# Patient Record
Sex: FEMALE | Race: BLACK OR AFRICAN AMERICAN | NOT HISPANIC OR LATINO
[De-identification: names, ages, dates, MRNs, and addresses within clinical notes are randomized per-mention and may not be internally consistent; named-entity substitution may affect disease eponyms.]

---

## 2017-04-11 ENCOUNTER — APPOINTMENT (OUTPATIENT)
Dept: GYNECOLOGIC ONCOLOGY | Facility: CLINIC | Age: 77
End: 2017-04-11

## 2017-04-11 VITALS
WEIGHT: 138 LBS | SYSTOLIC BLOOD PRESSURE: 125 MMHG | BODY MASS INDEX: 22.99 KG/M2 | HEIGHT: 65 IN | DIASTOLIC BLOOD PRESSURE: 78 MMHG

## 2017-10-17 ENCOUNTER — APPOINTMENT (OUTPATIENT)
Dept: GYNECOLOGIC ONCOLOGY | Facility: CLINIC | Age: 77
End: 2017-10-17
Payer: COMMERCIAL

## 2017-10-17 VITALS
BODY MASS INDEX: 22.49 KG/M2 | HEIGHT: 65 IN | DIASTOLIC BLOOD PRESSURE: 80 MMHG | WEIGHT: 135 LBS | SYSTOLIC BLOOD PRESSURE: 120 MMHG

## 2017-10-17 DIAGNOSIS — N64.52 NIPPLE DISCHARGE: ICD-10-CM

## 2017-10-17 PROCEDURE — 99214 OFFICE O/P EST MOD 30 MIN: CPT

## 2017-10-17 RX ORDER — OMEPRAZOLE 20 MG/1
20 CAPSULE, DELAYED RELEASE ORAL
Refills: 0 | Status: ACTIVE | COMMUNITY

## 2017-11-16 ENCOUNTER — RESULT REVIEW (OUTPATIENT)
Age: 77
End: 2017-11-16

## 2018-03-06 ENCOUNTER — APPOINTMENT (OUTPATIENT)
Dept: SURGERY | Facility: CLINIC | Age: 78
End: 2018-03-06
Payer: MEDICARE

## 2018-03-06 ENCOUNTER — LABORATORY RESULT (OUTPATIENT)
Age: 78
End: 2018-03-06

## 2018-03-06 VITALS
DIASTOLIC BLOOD PRESSURE: 68 MMHG | TEMPERATURE: 97.9 F | HEIGHT: 65 IN | BODY MASS INDEX: 22.16 KG/M2 | SYSTOLIC BLOOD PRESSURE: 157 MMHG | WEIGHT: 133 LBS | HEART RATE: 60 BPM

## 2018-03-06 DIAGNOSIS — N60.12 DIFFUSE CYSTIC MASTOPATHY OF LEFT BREAST: ICD-10-CM

## 2018-03-06 PROCEDURE — 99203 OFFICE O/P NEW LOW 30 MIN: CPT | Mod: 25

## 2018-03-06 PROCEDURE — 10021 FNA BX W/O IMG GDN 1ST LES: CPT

## 2018-03-20 ENCOUNTER — APPOINTMENT (OUTPATIENT)
Dept: SURGERY | Facility: CLINIC | Age: 78
End: 2018-03-20
Payer: MEDICARE

## 2018-03-20 VITALS
BODY MASS INDEX: 22.16 KG/M2 | DIASTOLIC BLOOD PRESSURE: 87 MMHG | HEIGHT: 65 IN | HEART RATE: 63 BPM | WEIGHT: 133 LBS | SYSTOLIC BLOOD PRESSURE: 159 MMHG | TEMPERATURE: 98.4 F

## 2018-03-20 PROCEDURE — 99214 OFFICE O/P EST MOD 30 MIN: CPT

## 2018-03-26 ENCOUNTER — OUTPATIENT (OUTPATIENT)
Dept: OUTPATIENT SERVICES | Facility: HOSPITAL | Age: 78
LOS: 1 days | End: 2018-03-26

## 2018-03-26 VITALS
WEIGHT: 132.06 LBS | SYSTOLIC BLOOD PRESSURE: 133 MMHG | RESPIRATION RATE: 16 BRPM | HEIGHT: 66 IN | DIASTOLIC BLOOD PRESSURE: 83 MMHG | HEART RATE: 79 BPM | OXYGEN SATURATION: 98 % | TEMPERATURE: 98 F

## 2018-03-26 DIAGNOSIS — Z98.890 OTHER SPECIFIED POSTPROCEDURAL STATES: Chronic | ICD-10-CM

## 2018-03-26 DIAGNOSIS — Z90.710 ACQUIRED ABSENCE OF BOTH CERVIX AND UTERUS: Chronic | ICD-10-CM

## 2018-03-26 DIAGNOSIS — Z98.89 OTHER SPECIFIED POSTPROCEDURAL STATES: Chronic | ICD-10-CM

## 2018-03-26 DIAGNOSIS — N63.0 UNSPECIFIED LUMP IN UNSPECIFIED BREAST: ICD-10-CM

## 2018-03-26 DIAGNOSIS — Z01.818 ENCOUNTER FOR OTHER PREPROCEDURAL EXAMINATION: ICD-10-CM

## 2018-03-26 NOTE — H&P PST ADULT - NEGATIVE OPHTHALMOLOGIC SYMPTOMS
no lacrimation L/no lacrimation R/no blurred vision R/no diplopia/no blurred vision L/no photophobia

## 2018-03-26 NOTE — H&P PST ADULT - FAMILY HISTORY
Sibling  Still living? No  Family history of breast cancer in sister, Age at diagnosis: Age Unknown  Family history of pancreatic cancer, Age at diagnosis: Age Unknown

## 2018-03-26 NOTE — H&P PST ADULT - MUSCULOSKELETAL
details… detailed exam no calf tenderness/no joint swelling/no joint erythema/normal/ROM intact/no joint warmth/normal strength

## 2018-03-26 NOTE — H&P PST ADULT - NSANTHOSAYNRD_GEN_A_CORE
No. YEIMI screening performed.  STOP BANG Legend: 0-2 = LOW Risk; 3-4 = INTERMEDIATE Risk; 5-8 = HIGH Risk

## 2018-03-26 NOTE — H&P PST ADULT - PMH
Breast discharge  left  Breast lump in female  left  Cataract  Bilateral  GERD (gastroesophageal reflux disease)    High cholesterol    History of hypertension    Ovarian ca  treated with chemo 1999 and 2008

## 2018-03-26 NOTE — H&P PST ADULT - PSH
H/O breast biopsy  Left  S/P abdominal hysterectomy    S/P breast biopsy, right  for discharge from breast  S/P  section  times two

## 2018-03-26 NOTE — H&P PST ADULT - NEGATIVE ENMT SYMPTOMS
no ear pain/no nasal discharge/no tinnitus/no vertigo/no hearing difficulty/no sinus symptoms/no nasal congestion

## 2018-03-26 NOTE — H&P PST ADULT - HISTORY OF PRESENT ILLNESS
76 yo female with history of HTN, HLD, GERD reports the above. Pt states has had biopsy in the left breast before. Patient denies pain in left breast

## 2018-03-27 ENCOUNTER — TRANSCRIPTION ENCOUNTER (OUTPATIENT)
Age: 78
End: 2018-03-27

## 2018-03-28 ENCOUNTER — RESULT REVIEW (OUTPATIENT)
Age: 78
End: 2018-03-28

## 2018-03-28 ENCOUNTER — OUTPATIENT (OUTPATIENT)
Dept: OUTPATIENT SERVICES | Facility: HOSPITAL | Age: 78
LOS: 1 days | End: 2018-03-28
Payer: COMMERCIAL

## 2018-03-28 ENCOUNTER — APPOINTMENT (OUTPATIENT)
Dept: SURGERY | Facility: HOSPITAL | Age: 78
End: 2018-03-28

## 2018-03-28 VITALS
RESPIRATION RATE: 17 BRPM | SYSTOLIC BLOOD PRESSURE: 144 MMHG | HEART RATE: 81 BPM | TEMPERATURE: 98 F | OXYGEN SATURATION: 98 % | DIASTOLIC BLOOD PRESSURE: 75 MMHG

## 2018-03-28 VITALS
OXYGEN SATURATION: 99 % | TEMPERATURE: 98 F | RESPIRATION RATE: 14 BRPM | WEIGHT: 132.06 LBS | DIASTOLIC BLOOD PRESSURE: 84 MMHG | SYSTOLIC BLOOD PRESSURE: 154 MMHG | HEIGHT: 66 IN | HEART RATE: 66 BPM

## 2018-03-28 DIAGNOSIS — Z98.89 OTHER SPECIFIED POSTPROCEDURAL STATES: Chronic | ICD-10-CM

## 2018-03-28 DIAGNOSIS — N63.0 UNSPECIFIED LUMP IN UNSPECIFIED BREAST: ICD-10-CM

## 2018-03-28 DIAGNOSIS — Z90.710 ACQUIRED ABSENCE OF BOTH CERVIX AND UTERUS: Chronic | ICD-10-CM

## 2018-03-28 DIAGNOSIS — Z98.890 OTHER SPECIFIED POSTPROCEDURAL STATES: Chronic | ICD-10-CM

## 2018-03-28 PROCEDURE — 19120 REMOVAL OF BREAST LESION: CPT | Mod: LT

## 2018-03-28 PROCEDURE — 88360 TUMOR IMMUNOHISTOCHEM/MANUAL: CPT

## 2018-03-28 PROCEDURE — 88305 TISSUE EXAM BY PATHOLOGIST: CPT

## 2018-03-28 PROCEDURE — 88360 TUMOR IMMUNOHISTOCHEM/MANUAL: CPT | Mod: 26

## 2018-03-28 PROCEDURE — 88305 TISSUE EXAM BY PATHOLOGIST: CPT | Mod: 26

## 2018-03-28 RX ORDER — OXYCODONE AND ACETAMINOPHEN 5; 325 MG/1; MG/1
1 TABLET ORAL EVERY 4 HOURS
Qty: 0 | Refills: 0 | Status: DISCONTINUED | OUTPATIENT
Start: 2018-03-28 | End: 2018-03-28

## 2018-03-28 RX ORDER — ONDANSETRON 8 MG/1
4 TABLET, FILM COATED ORAL ONCE
Qty: 0 | Refills: 0 | Status: DISCONTINUED | OUTPATIENT
Start: 2018-03-28 | End: 2018-03-29

## 2018-03-28 RX ORDER — ACETAMINOPHEN 500 MG
1000 TABLET ORAL ONCE
Qty: 0 | Refills: 0 | Status: COMPLETED | OUTPATIENT
Start: 2018-03-28 | End: 2018-03-28

## 2018-03-28 RX ORDER — LABETALOL HCL 100 MG
5 TABLET ORAL
Qty: 0 | Refills: 0 | Status: DISCONTINUED | OUTPATIENT
Start: 2018-03-28 | End: 2018-04-05

## 2018-03-28 RX ORDER — FENTANYL CITRATE 50 UG/ML
25 INJECTION INTRAVENOUS
Qty: 0 | Refills: 0 | Status: DISCONTINUED | OUTPATIENT
Start: 2018-03-28 | End: 2018-03-29

## 2018-03-28 RX ORDER — SODIUM CHLORIDE 9 MG/ML
3 INJECTION INTRAMUSCULAR; INTRAVENOUS; SUBCUTANEOUS EVERY 8 HOURS
Qty: 0 | Refills: 0 | Status: DISCONTINUED | OUTPATIENT
Start: 2018-03-28 | End: 2018-03-28

## 2018-03-28 RX ADMIN — Medication 400 MILLIGRAM(S): at 18:45

## 2018-03-28 RX ADMIN — Medication 5 MILLIGRAM(S): at 20:07

## 2018-03-28 RX ADMIN — Medication 1000 MILLIGRAM(S): at 19:45

## 2018-03-28 NOTE — ASU DISCHARGE PLAN (ADULT/PEDIATRIC). - MEDICATION SUMMARY - MEDICATIONS TO TAKE
I will START or STAY ON the medications listed below when I get home from the hospital:    oxyCODONE-acetaminophen 5 mg-325 mg oral tablet  -- 1 tab(s) by mouth every 6 hours as needed for pain not controlled with ibuprofen MDD:4  -- Indication: For Pain management    aspirin 81 mg oral tablet  -- 1 tab(s) by mouth once a day  -- Indication: For Home medication    simvastatin 20 mg oral tablet  -- 1 tab(s) by mouth once a day (at bedtime)  -- Indication: For Home medication    irbesartan-hydrochlorothiazide 150mg-12.5mg oral tablet  -- 1 tab(s) by mouth once a day  -- Indication: For Home medication    metoprolol tartrate 25 mg oral tablet  -- 1 tab(s) by mouth every 12 hours  -- Indication: For Home medication    omeprazole 20 mg oral delayed release capsule  -- 1 cap(s) by mouth once a day  -- Indication: For Home medication    Multiple Vitamins oral tablet  -- 1 tab(s) by mouth once a day  -- Indication: For Home medication    Vitamin D3 1000 intl units oral tablet  -- 1 tab(s) by mouth once a day  -- Indication: For Home medication    Vitamin C 500 mg oral tablet  -- 1 tab(s) by mouth once a day  -- Indication: For Home medication

## 2018-03-28 NOTE — BRIEF OPERATIVE NOTE - PROCEDURE
<<-----Click on this checkbox to enter Procedure Excision of left breast mass  03/28/2018    Active  [facility]

## 2018-04-02 ENCOUNTER — APPOINTMENT (OUTPATIENT)
Dept: SURGERY | Facility: CLINIC | Age: 78
End: 2018-04-02
Payer: MEDICARE

## 2018-04-02 PROCEDURE — 99024 POSTOP FOLLOW-UP VISIT: CPT

## 2018-04-11 LAB — SURGICAL PATHOLOGY FINAL REPORT - CH: SIGNIFICANT CHANGE UP

## 2018-04-12 ENCOUNTER — APPOINTMENT (OUTPATIENT)
Dept: SURGERY | Facility: CLINIC | Age: 78
End: 2018-04-12
Payer: MEDICARE

## 2018-04-12 PROCEDURE — 99214 OFFICE O/P EST MOD 30 MIN: CPT | Mod: 24

## 2018-04-30 ENCOUNTER — APPOINTMENT (OUTPATIENT)
Dept: GYNECOLOGIC ONCOLOGY | Facility: CLINIC | Age: 78
End: 2018-04-30
Payer: MEDICARE

## 2018-04-30 VITALS
BODY MASS INDEX: 22.82 KG/M2 | WEIGHT: 137 LBS | HEIGHT: 65 IN | SYSTOLIC BLOOD PRESSURE: 159 MMHG | DIASTOLIC BLOOD PRESSURE: 98 MMHG

## 2018-04-30 PROCEDURE — 99214 OFFICE O/P EST MOD 30 MIN: CPT

## 2018-05-08 ENCOUNTER — OUTPATIENT (OUTPATIENT)
Dept: OUTPATIENT SERVICES | Facility: HOSPITAL | Age: 78
LOS: 1 days | End: 2018-05-08
Payer: COMMERCIAL

## 2018-05-08 VITALS
DIASTOLIC BLOOD PRESSURE: 74 MMHG | RESPIRATION RATE: 16 BRPM | TEMPERATURE: 99 F | WEIGHT: 138.01 LBS | OXYGEN SATURATION: 100 % | HEART RATE: 69 BPM | SYSTOLIC BLOOD PRESSURE: 151 MMHG | HEIGHT: 65 IN

## 2018-05-08 DIAGNOSIS — Z98.890 OTHER SPECIFIED POSTPROCEDURAL STATES: Chronic | ICD-10-CM

## 2018-05-08 DIAGNOSIS — Z98.89 OTHER SPECIFIED POSTPROCEDURAL STATES: Chronic | ICD-10-CM

## 2018-05-08 DIAGNOSIS — C50.912 MALIGNANT NEOPLASM OF UNSPECIFIED SITE OF LEFT FEMALE BREAST: ICD-10-CM

## 2018-05-08 DIAGNOSIS — Z90.710 ACQUIRED ABSENCE OF BOTH CERVIX AND UTERUS: Chronic | ICD-10-CM

## 2018-05-08 DIAGNOSIS — Z01.818 ENCOUNTER FOR OTHER PREPROCEDURAL EXAMINATION: ICD-10-CM

## 2018-05-08 PROCEDURE — 86901 BLOOD TYPING SEROLOGIC RH(D): CPT

## 2018-05-08 PROCEDURE — 86850 RBC ANTIBODY SCREEN: CPT

## 2018-05-08 PROCEDURE — 86900 BLOOD TYPING SEROLOGIC ABO: CPT

## 2018-05-08 PROCEDURE — G0463: CPT

## 2018-05-08 RX ORDER — SODIUM CHLORIDE 9 MG/ML
3 INJECTION INTRAMUSCULAR; INTRAVENOUS; SUBCUTANEOUS EVERY 8 HOURS
Qty: 0 | Refills: 0 | Status: DISCONTINUED | OUTPATIENT
Start: 2018-05-09 | End: 2018-05-10

## 2018-05-08 NOTE — H&P PST ADULT - NEGATIVE CARDIOVASCULAR SYMPTOMS
no paroxysmal nocturnal dyspnea/no peripheral edema/no claudication/no palpitations/no chest pain/no dyspnea on exertion/no orthopnea

## 2018-05-08 NOTE — H&P PST ADULT - ASSESSMENT
76 y/o female with PMH of hypertension, hyperlipidemia, GERD, ovarian cancer in remission s/p hysterectomy and chemo in 1999 and chemotherapy in 2008, diagnosed with left breast cancer scheduled for left breast total mastectomy with left breast axillary sentinel node biopsy 5/9/18. Patient is at moderate risk for planned procedure

## 2018-05-08 NOTE — H&P PST ADULT - BREASTS DETAILS
mass L/tenderness L/nipple discharge L mass L/tenderness L/nipple discharge L/crusting at left nipple, warmth left breast, left breast mass palpated at 4'o clock, steri strips from prior surgical intervention in place, dry and intact

## 2018-05-08 NOTE — H&P PST ADULT - RS GEN PE MLT RESP DETAILS PC
no rales/no subcutaneous emphysema/normal/airway patent/respirations non-labored/breath sounds equal/good air movement/clear to auscultation bilaterally/no intercostal retractions/no chest wall tenderness/no rhonchi/no wheezes no rhonchi/no subcutaneous emphysema/no wheezes/respirations non-labored/breath sounds equal/clear to auscultation bilaterally/good air movement/no intercostal retractions/no rales/airway patent/no chest wall tenderness

## 2018-05-08 NOTE — H&P PST ADULT - HISTORY OF PRESENT ILLNESS
76 y/o female with PMH of hypertension, hyperlipidemia, GERD, ovarian cancer in remission s/p surgery in 1999 and 2008, now diagnosed with left breast cancer. She is scheduled for left breast total mastectomy with left breast axillary sentinel node biopsy 5/9/18 76 y/o female with PMH of hypertension, hyperlipidemia, GERD, ovarian cancer in remission s/p hysterectomy and chemo in 1999 and chemotherapy in 2008, now diagnosed with left breast cancer. She is scheduled for left breast total mastectomy with left breast axillary sentinel node biopsy 5/9/18

## 2018-05-08 NOTE — H&P PST ADULT - PAIN COMMENT, PROFILE
reports occasional sharp pain 3/10 on numeric pain scale at site of left breast biopsy since 3/28/2018

## 2018-05-08 NOTE — H&P PST ADULT - PROBLEM SELECTOR PLAN 1
Scheduled for left breast total mastectomy with left breast axillary sentinel node biopsy 5/9/18. Preoperative instructions discussed and given to patient. Verbalized understanding of same

## 2018-05-09 ENCOUNTER — RESULT REVIEW (OUTPATIENT)
Age: 78
End: 2018-05-09

## 2018-05-09 ENCOUNTER — APPOINTMENT (OUTPATIENT)
Dept: SURGERY | Facility: HOSPITAL | Age: 78
End: 2018-05-09

## 2018-05-09 ENCOUNTER — INPATIENT (INPATIENT)
Facility: HOSPITAL | Age: 78
LOS: 0 days | Discharge: HOME | DRG: 580 | End: 2018-05-10
Attending: SURGERY | Admitting: SURGERY
Payer: COMMERCIAL

## 2018-05-09 VITALS
DIASTOLIC BLOOD PRESSURE: 74 MMHG | WEIGHT: 138.01 LBS | HEART RATE: 68 BPM | HEIGHT: 65 IN | SYSTOLIC BLOOD PRESSURE: 169 MMHG | OXYGEN SATURATION: 100 % | RESPIRATION RATE: 16 BRPM | TEMPERATURE: 98 F

## 2018-05-09 DIAGNOSIS — Z98.89 OTHER SPECIFIED POSTPROCEDURAL STATES: Chronic | ICD-10-CM

## 2018-05-09 DIAGNOSIS — C50.912 MALIGNANT NEOPLASM OF UNSPECIFIED SITE OF LEFT FEMALE BREAST: ICD-10-CM

## 2018-05-09 DIAGNOSIS — Z98.890 OTHER SPECIFIED POSTPROCEDURAL STATES: Chronic | ICD-10-CM

## 2018-05-09 DIAGNOSIS — Z90.710 ACQUIRED ABSENCE OF BOTH CERVIX AND UTERUS: Chronic | ICD-10-CM

## 2018-05-09 PROCEDURE — 88307 TISSUE EXAM BY PATHOLOGIST: CPT | Mod: 26

## 2018-05-09 PROCEDURE — 19303 MAST SIMPLE COMPLETE: CPT | Mod: 78,LT

## 2018-05-09 PROCEDURE — 88333 PATH CONSLTJ SURG CYTO XM 1: CPT | Mod: 26

## 2018-05-09 PROCEDURE — 78195 LYMPH SYSTEM IMAGING: CPT | Mod: 26

## 2018-05-09 PROCEDURE — 88305 TISSUE EXAM BY PATHOLOGIST: CPT | Mod: 26

## 2018-05-09 PROCEDURE — 38745 REMOVE ARMPIT LYMPH NODES: CPT | Mod: 78,59,LT

## 2018-05-09 PROCEDURE — 38792 RA TRACER ID OF SENTINL NODE: CPT | Mod: 58,59,LT

## 2018-05-09 PROCEDURE — 38790 INJECT FOR LYMPHATIC X-RAY: CPT | Mod: 58,59,LT

## 2018-05-09 RX ORDER — CHOLECALCIFEROL (VITAMIN D3) 125 MCG
1 CAPSULE ORAL
Qty: 0 | Refills: 0 | COMMUNITY

## 2018-05-09 RX ORDER — HYDROMORPHONE HYDROCHLORIDE 2 MG/ML
0.5 INJECTION INTRAMUSCULAR; INTRAVENOUS; SUBCUTANEOUS EVERY 4 HOURS
Qty: 0 | Refills: 0 | Status: DISCONTINUED | OUTPATIENT
Start: 2018-05-09 | End: 2018-05-10

## 2018-05-09 RX ORDER — HYDROCHLOROTHIAZIDE 25 MG
12.5 TABLET ORAL DAILY
Qty: 0 | Refills: 0 | Status: DISCONTINUED | OUTPATIENT
Start: 2018-05-09 | End: 2018-05-10

## 2018-05-09 RX ORDER — PANTOPRAZOLE SODIUM 20 MG/1
40 TABLET, DELAYED RELEASE ORAL
Qty: 0 | Refills: 0 | Status: DISCONTINUED | OUTPATIENT
Start: 2018-05-09 | End: 2018-05-10

## 2018-05-09 RX ORDER — ASPIRIN/CALCIUM CARB/MAGNESIUM 324 MG
1 TABLET ORAL
Qty: 0 | Refills: 0 | COMMUNITY

## 2018-05-09 RX ORDER — SIMVASTATIN 20 MG/1
20 TABLET, FILM COATED ORAL AT BEDTIME
Qty: 0 | Refills: 0 | Status: DISCONTINUED | OUTPATIENT
Start: 2018-05-09 | End: 2018-05-10

## 2018-05-09 RX ORDER — LOSARTAN POTASSIUM 100 MG/1
50 TABLET, FILM COATED ORAL DAILY
Qty: 0 | Refills: 0 | Status: DISCONTINUED | OUTPATIENT
Start: 2018-05-09 | End: 2018-05-10

## 2018-05-09 RX ORDER — HEPARIN SODIUM 5000 [USP'U]/ML
5000 INJECTION INTRAVENOUS; SUBCUTANEOUS EVERY 8 HOURS
Qty: 0 | Refills: 0 | Status: DISCONTINUED | OUTPATIENT
Start: 2018-05-10 | End: 2018-05-10

## 2018-05-09 RX ORDER — METOPROLOL TARTRATE 50 MG
25 TABLET ORAL EVERY 12 HOURS
Qty: 0 | Refills: 0 | Status: DISCONTINUED | OUTPATIENT
Start: 2018-05-09 | End: 2018-05-10

## 2018-05-09 RX ORDER — MORPHINE SULFATE 50 MG/1
2 CAPSULE, EXTENDED RELEASE ORAL
Qty: 0 | Refills: 0 | Status: DISCONTINUED | OUTPATIENT
Start: 2018-05-09 | End: 2018-05-09

## 2018-05-09 RX ORDER — ASCORBIC ACID 60 MG
1 TABLET,CHEWABLE ORAL
Qty: 0 | Refills: 0 | COMMUNITY

## 2018-05-09 RX ORDER — ACETAMINOPHEN 500 MG
1000 TABLET ORAL ONCE
Qty: 0 | Refills: 0 | Status: DISCONTINUED | OUTPATIENT
Start: 2018-05-09 | End: 2018-05-09

## 2018-05-09 RX ORDER — ASCORBIC ACID 60 MG
500 TABLET,CHEWABLE ORAL DAILY
Qty: 0 | Refills: 0 | Status: DISCONTINUED | OUTPATIENT
Start: 2018-05-09 | End: 2018-05-10

## 2018-05-09 RX ADMIN — Medication 25 MILLIGRAM(S): at 18:43

## 2018-05-09 RX ADMIN — SODIUM CHLORIDE 3 MILLILITER(S): 9 INJECTION INTRAMUSCULAR; INTRAVENOUS; SUBCUTANEOUS at 07:59

## 2018-05-09 RX ADMIN — Medication 500 MILLIGRAM(S): at 18:43

## 2018-05-09 RX ADMIN — SODIUM CHLORIDE 3 MILLILITER(S): 9 INJECTION INTRAMUSCULAR; INTRAVENOUS; SUBCUTANEOUS at 21:12

## 2018-05-09 RX ADMIN — Medication 12.5 MILLIGRAM(S): at 18:43

## 2018-05-09 RX ADMIN — LOSARTAN POTASSIUM 50 MILLIGRAM(S): 100 TABLET, FILM COATED ORAL at 18:43

## 2018-05-09 RX ADMIN — SIMVASTATIN 20 MILLIGRAM(S): 20 TABLET, FILM COATED ORAL at 21:16

## 2018-05-09 RX ADMIN — Medication 1 TABLET(S): at 18:43

## 2018-05-09 NOTE — BRIEF OPERATIVE NOTE - PROCEDURE
<<-----Click on this checkbox to enter Procedure Modified radical mastectomy  05/09/2018  axillary lymph node dissection  Left  Active  [facility]

## 2018-05-09 NOTE — PROGRESS NOTE ADULT - SUBJECTIVE AND OBJECTIVE BOX
Post Op    Pt c/o mild wound pain.  Denies N/v, fever/chills    PE: comfortable    Vital Signs Last 24 Hrs  T(C): 36.5 (09 May 2018 14:20), Max: 36.8 (09 May 2018 12:58)  T(F): 97.7 (09 May 2018 14:20), Max: 98.3 (09 May 2018 12:58)  HR: 67 (09 May 2018 18:15) (62 - 75)  BP: 135/68 (09 May 2018 18:15) (135/68 - 175/89)  BP(mean): 102 (09 May 2018 13:28) (97 - 113)  RR: 17 (09 May 2018 14:20) (10 - 21)  SpO2: 97% (09 May 2018 14:20) (95% - 100%)    Chest: left chest dressing C/D/I; BESS with sanguinous drainage      I&O's Detail    09 May 2018 07:01  -  09 May 2018 21:46  --------------------------------------------------------  IN:    0.9% NaCl: 1000 mL  Total IN: 1000 mL    OUT:    Bulb: 30 mL    Bulb: 80 mL  Total OUT: 110 mL    Total NET: 890 mL      MEDICATIONS  (STANDING):  ascorbic acid 500 milliGRAM(s) Oral daily  hydrochlorothiazide 12.5 milliGRAM(s) Oral daily  losartan 50 milliGRAM(s) Oral daily  metoprolol tartrate 25 milliGRAM(s) Oral every 12 hours  multivitamin 1 Tablet(s) Oral daily  pantoprazole    Tablet 40 milliGRAM(s) Oral before breakfast  simvastatin 20 milliGRAM(s) Oral at bedtime  sodium chloride 0.9% lock flush 3 milliLiter(s) IV Push every 8 hours    MEDICATIONS  (PRN):  HYDROmorphone  Injectable 0.5 milliGRAM(s) IV Push every 4 hours PRN Moderate Pain (4 - 6)

## 2018-05-09 NOTE — BRIEF OPERATIVE NOTE - PRE-OP DX
Carcinoma of female breast, unspecified estrogen receptor status, unspecified laterality, unspecified site of breast  05/09/2018  left  Active  Alvaro Baldwin

## 2018-05-09 NOTE — BRIEF OPERATIVE NOTE - POST-OP DX
Carcinoma of left female breast, unspecified estrogen receptor status, unspecified site of breast  05/09/2018    Active  Alvaro Baldwin

## 2018-05-09 NOTE — PROGRESS NOTE ADULT - ASSESSMENT
S/P L MRM, left axillary dissection    Stable    -Pain management  -CBC in AM  -OOB, IS  -GI/DVT prophylaxis

## 2018-05-10 ENCOUNTER — TRANSCRIPTION ENCOUNTER (OUTPATIENT)
Age: 78
End: 2018-05-10

## 2018-05-10 VITALS
SYSTOLIC BLOOD PRESSURE: 147 MMHG | RESPIRATION RATE: 16 BRPM | OXYGEN SATURATION: 98 % | DIASTOLIC BLOOD PRESSURE: 71 MMHG | HEART RATE: 73 BPM | TEMPERATURE: 99 F

## 2018-05-10 LAB
HCT VFR BLD CALC: 40 % — SIGNIFICANT CHANGE UP (ref 34.5–45)
HGB BLD-MCNC: 13 G/DL — SIGNIFICANT CHANGE UP (ref 11.5–15.5)
MCHC RBC-ENTMCNC: 32.1 PG — SIGNIFICANT CHANGE UP (ref 27–34)
MCHC RBC-ENTMCNC: 32.3 GM/DL — SIGNIFICANT CHANGE UP (ref 32–36)
MCV RBC AUTO: 99.3 FL — SIGNIFICANT CHANGE UP (ref 80–100)
PLATELET # BLD AUTO: 157 K/UL — SIGNIFICANT CHANGE UP (ref 150–400)
RBC # BLD: 4.03 M/UL — SIGNIFICANT CHANGE UP (ref 3.8–5.2)
RBC # FLD: 12.6 % — SIGNIFICANT CHANGE UP (ref 10.3–14.5)
WBC # BLD: 7.3 K/UL — SIGNIFICANT CHANGE UP (ref 3.8–10.5)
WBC # FLD AUTO: 7.3 K/UL — SIGNIFICANT CHANGE UP (ref 3.8–10.5)

## 2018-05-10 PROCEDURE — 88307 TISSUE EXAM BY PATHOLOGIST: CPT

## 2018-05-10 PROCEDURE — 88305 TISSUE EXAM BY PATHOLOGIST: CPT

## 2018-05-10 PROCEDURE — 85027 COMPLETE CBC AUTOMATED: CPT

## 2018-05-10 PROCEDURE — 78195 LYMPH SYSTEM IMAGING: CPT

## 2018-05-10 PROCEDURE — 88333 PATH CONSLTJ SURG CYTO XM 1: CPT

## 2018-05-10 PROCEDURE — 86901 BLOOD TYPING SEROLOGIC RH(D): CPT

## 2018-05-10 PROCEDURE — A9541: CPT

## 2018-05-10 RX ADMIN — HEPARIN SODIUM 5000 UNIT(S): 5000 INJECTION INTRAVENOUS; SUBCUTANEOUS at 05:49

## 2018-05-10 RX ADMIN — LOSARTAN POTASSIUM 50 MILLIGRAM(S): 100 TABLET, FILM COATED ORAL at 05:49

## 2018-05-10 RX ADMIN — PANTOPRAZOLE SODIUM 40 MILLIGRAM(S): 20 TABLET, DELAYED RELEASE ORAL at 05:49

## 2018-05-10 RX ADMIN — Medication 25 MILLIGRAM(S): at 05:49

## 2018-05-10 RX ADMIN — Medication 12.5 MILLIGRAM(S): at 05:49

## 2018-05-10 RX ADMIN — SODIUM CHLORIDE 3 MILLILITER(S): 9 INJECTION INTRAMUSCULAR; INTRAVENOUS; SUBCUTANEOUS at 06:00

## 2018-05-10 NOTE — DISCHARGE NOTE ADULT - PLAN OF CARE
Removal of Drain Follow up with Dr. Cole in the office next week. Please call and make an appointment. The office number is [phone] and it is located at [address] [place] [address]. Please do not shower or get the dressing wet, Dr. Cole will remove it in the office. Please record the drain outputs and keep a log and bring that log with you when you see Dr. Cole. Continue current treatment regimen as prescribed by your PMD

## 2018-05-10 NOTE — DISCHARGE NOTE ADULT - MEDICATION SUMMARY - MEDICATIONS TO TAKE
I will START or STAY ON the medications listed below when I get home from the hospital:    aspirin 81 mg oral tablet  -- 1 tab(s) by mouth once a day  -- Indication: For As previously prescribed    oxyCODONE-acetaminophen 5 mg-325 mg oral tablet  -- 1 tab(s) by mouth every 6 hours, As Needed -for moderate pain MDD:4 tabs   -- Caution federal law prohibits the transfer of this drug to any person other  than the person for whom it was prescribed.  May cause drowsiness.  Alcohol may intensify this effect.  Use care when operating dangerous machinery.  This prescription cannot be refilled.  This product contains acetaminophen.  Do not use  with any other product containing acetaminophen to prevent possible liver damage.  Using more of this medication than prescribed may cause serious breathing problems.    -- Indication: For Post-op Pain    simvastatin 20 mg oral tablet  -- 1 tab(s) by mouth once a day (at bedtime)  -- Indication: For As previously prescribed    irbesartan-hydrochlorothiazide 150mg-12.5mg oral tablet  -- 1 tab(s) by mouth once a day  -- Indication: For As previously prescribed    metoprolol tartrate 25 mg oral tablet  -- 1 tab(s) by mouth every 12 hours  -- Indication: For As previously prescribed    omeprazole 20 mg oral delayed release capsule  -- 1 cap(s) by mouth once a day  -- Indication: For As previously prescribed    Multiple Vitamins oral tablet  -- 1 tab(s) by mouth once a day  -- Indication: For As previously prescribed    Calcium 600+D oral tablet  -- 1 tab(s) by mouth 2 times a day  -- Indication: For As previously prescribed    Vitamin D3 1000 intl units oral tablet  -- 1 tab(s) by mouth once a day  -- Indication: For As previously prescribed    Vitamin C 500 mg oral tablet  -- 1 tab(s) by mouth once a day  -- Indication: For As previously prescribed

## 2018-05-10 NOTE — DISCHARGE NOTE ADULT - PATIENT PORTAL LINK FT
You can access the [facility] Patient Portal, offered by [facility], by registering with the following website: [email or web address]

## 2018-05-10 NOTE — DISCHARGE NOTE ADULT - HOSPITAL COURSE
Underwent elective Left modified radical mastectomy on 5/9.  Post-op course was uncomplicated & she was discharged on 5/10

## 2018-05-10 NOTE — DISCHARGE NOTE ADULT - CARE PROVIDER_API CALL
German Cole (MD), Surgery  [address] [place], [place] [id number]  Phone: [phone]  Fax: [phone]

## 2018-05-10 NOTE — PROGRESS NOTE ADULT - SUBJECTIVE AND OBJECTIVE BOX
s/p L MRM 5/9, POD #1      Patient examined at bedside, no complaints.   Denies pain  No nausea, no vomiting  Tolerating diet        T(F): 99.4 (05-10-18 @ 06:10), Max: 99.4 (05-10-18 @ 06:10)  HR: 73 (05-10-18 @ 06:10) (61 - 75)  BP: 147/71 (05-10-18 @ 06:10) (125/57 - 175/89)  RR: 16 (05-10-18 @ 06:10) (10 - 21)  SpO2: 98% (05-10-18 @ 06:10) (95% - 100%)          Bulb: 50 mL    Bulb: 120 mL        Physical Exam  General: AAOx3, No acute distress  Skin: No jaundice, no icterus  Chest: Mildly tender to palpation, dressing is clean, dry and intact, no hematoma, BESS x2 to sxn  Extremities: non edematous, no calf pain bilaterally

## 2018-05-10 NOTE — DISCHARGE NOTE ADULT - CARE PLAN
Principal Discharge DX:	Malignant neoplasm of left female breast, unspecified estrogen receptor status, unspecified site of breast  Goal:	Removal of Drain  Assessment and plan of treatment:	Follow up with Dr. Cole in the office next week. Please call and make an appointment. The office number is [phone] and it is located at [address] [place] [address]. Please do not shower or get the dressing wet, Dr. Cole will remove it in the office. Please record the drain outputs and keep a log and bring that log with you when you see Dr. Cole.  Secondary Diagnosis:	High cholesterol  Assessment and plan of treatment:	Continue current treatment regimen as prescribed by your PMD  Secondary Diagnosis:	History of hypertension  Assessment and plan of treatment:	Continue current treatment regimen as prescribed by your PMD

## 2018-05-10 NOTE — DISCHARGE NOTE ADULT - INSTRUCTIONS
None Keep dressing clean and dry.  Avoid blood draws and blood pressure checks on the left arm.  Empty BESS drains as instructed.

## 2018-05-14 LAB — SURGICAL PATHOLOGY FINAL REPORT - CH: SIGNIFICANT CHANGE UP

## 2018-05-15 ENCOUNTER — APPOINTMENT (OUTPATIENT)
Dept: SURGERY | Facility: CLINIC | Age: 78
End: 2018-05-15
Payer: MEDICARE

## 2018-05-15 PROCEDURE — 99024 POSTOP FOLLOW-UP VISIT: CPT

## 2018-05-18 PROBLEM — Z85.3 HISTORY OF MALIGNANT NEOPLASM OF BREAST: Status: RESOLVED | Noted: 2018-05-18 | Resolved: 2018-05-18

## 2018-05-22 ENCOUNTER — APPOINTMENT (OUTPATIENT)
Dept: SURGERY | Facility: CLINIC | Age: 78
End: 2018-05-22

## 2018-05-22 DIAGNOSIS — Z85.3 PERSONAL HISTORY OF MALIGNANT NEOPLASM OF BREAST: ICD-10-CM

## 2018-05-29 ENCOUNTER — OTHER (OUTPATIENT)
Age: 78
End: 2018-05-29

## 2018-07-21 ENCOUNTER — INPATIENT (INPATIENT)
Facility: HOSPITAL | Age: 78
LOS: 6 days | Discharge: HOME | End: 2018-07-28
Attending: INTERNAL MEDICINE | Admitting: INTERNAL MEDICINE
Payer: COMMERCIAL

## 2018-07-21 VITALS
RESPIRATION RATE: 18 BRPM | OXYGEN SATURATION: 97 % | HEART RATE: 115 BPM | SYSTOLIC BLOOD PRESSURE: 140 MMHG | TEMPERATURE: 98 F | WEIGHT: 134.92 LBS | HEIGHT: 65 IN | DIASTOLIC BLOOD PRESSURE: 84 MMHG

## 2018-07-21 DIAGNOSIS — E87.6 HYPOKALEMIA: ICD-10-CM

## 2018-07-21 DIAGNOSIS — Z98.890 OTHER SPECIFIED POSTPROCEDURAL STATES: Chronic | ICD-10-CM

## 2018-07-21 DIAGNOSIS — Z98.89 OTHER SPECIFIED POSTPROCEDURAL STATES: Chronic | ICD-10-CM

## 2018-07-21 DIAGNOSIS — Z90.710 ACQUIRED ABSENCE OF BOTH CERVIX AND UTERUS: Chronic | ICD-10-CM

## 2018-07-21 DIAGNOSIS — R55 SYNCOPE AND COLLAPSE: ICD-10-CM

## 2018-07-21 LAB
ALBUMIN SERPL ELPH-MCNC: 3.1 G/DL — LOW (ref 3.3–5)
ALP SERPL-CCNC: 50 U/L — SIGNIFICANT CHANGE UP (ref 40–120)
ALT FLD-CCNC: 65 U/L — SIGNIFICANT CHANGE UP (ref 12–78)
ANION GAP SERPL CALC-SCNC: 5 MMOL/L — SIGNIFICANT CHANGE UP (ref 5–17)
APPEARANCE UR: CLEAR — SIGNIFICANT CHANGE UP
APTT BLD: 22.3 SEC — LOW (ref 27.5–37.4)
AST SERPL-CCNC: 39 U/L — HIGH (ref 15–37)
BASOPHILS # BLD AUTO: 0.01 K/UL — SIGNIFICANT CHANGE UP (ref 0–0.2)
BASOPHILS NFR BLD AUTO: 0.1 % — SIGNIFICANT CHANGE UP (ref 0–2)
BILIRUB SERPL-MCNC: 0.9 MG/DL — SIGNIFICANT CHANGE UP (ref 0.2–1.2)
BILIRUB UR-MCNC: NEGATIVE — SIGNIFICANT CHANGE UP
BUN SERPL-MCNC: 35 MG/DL — HIGH (ref 7–23)
CALCIUM SERPL-MCNC: 8.5 MG/DL — SIGNIFICANT CHANGE UP (ref 8.5–10.1)
CHLORIDE SERPL-SCNC: 100 MMOL/L — SIGNIFICANT CHANGE UP (ref 96–108)
CK MB CFR SERPL CALC: 0.9 NG/ML — SIGNIFICANT CHANGE UP (ref 0.5–3.6)
CO2 SERPL-SCNC: 36 MMOL/L — HIGH (ref 22–31)
COLOR SPEC: YELLOW — SIGNIFICANT CHANGE UP
CREAT SERPL-MCNC: 0.94 MG/DL — SIGNIFICANT CHANGE UP (ref 0.5–1.3)
D DIMER BLD IA.RAPID-MCNC: 494 NG/ML DDU — HIGH
DIFF PNL FLD: ABNORMAL
EOSINOPHIL # BLD AUTO: 0.08 K/UL — SIGNIFICANT CHANGE UP (ref 0–0.5)
EOSINOPHIL NFR BLD AUTO: 1.1 % — SIGNIFICANT CHANGE UP (ref 0–6)
GLUCOSE BLDC GLUCOMTR-MCNC: 145 MG/DL — HIGH (ref 70–99)
GLUCOSE SERPL-MCNC: 79 MG/DL — SIGNIFICANT CHANGE UP (ref 70–99)
GLUCOSE UR QL: NEGATIVE MG/DL — SIGNIFICANT CHANGE UP
HCT VFR BLD CALC: 43.9 % — SIGNIFICANT CHANGE UP (ref 34.5–45)
HGB BLD-MCNC: 14.6 G/DL — SIGNIFICANT CHANGE UP (ref 11.5–15.5)
IMM GRANULOCYTES NFR BLD AUTO: 1.4 % — SIGNIFICANT CHANGE UP (ref 0–1.5)
INR BLD: 1.11 RATIO — SIGNIFICANT CHANGE UP (ref 0.88–1.16)
KETONES UR-MCNC: NEGATIVE — SIGNIFICANT CHANGE UP
LEUKOCYTE ESTERASE UR-ACNC: ABNORMAL
LYMPHOCYTES # BLD AUTO: 1.1 K/UL — SIGNIFICANT CHANGE UP (ref 1–3.3)
LYMPHOCYTES # BLD AUTO: 15.7 % — SIGNIFICANT CHANGE UP (ref 13–44)
MCHC RBC-ENTMCNC: 31.5 PG — SIGNIFICANT CHANGE UP (ref 27–34)
MCHC RBC-ENTMCNC: 33.3 GM/DL — SIGNIFICANT CHANGE UP (ref 32–36)
MCV RBC AUTO: 94.6 FL — SIGNIFICANT CHANGE UP (ref 80–100)
MONOCYTES # BLD AUTO: 0.1 K/UL — SIGNIFICANT CHANGE UP (ref 0–0.9)
MONOCYTES NFR BLD AUTO: 1.4 % — LOW (ref 2–14)
NEUTROPHILS # BLD AUTO: 5.62 K/UL — SIGNIFICANT CHANGE UP (ref 1.8–7.4)
NEUTROPHILS NFR BLD AUTO: 80.3 % — HIGH (ref 43–77)
NITRITE UR-MCNC: NEGATIVE — SIGNIFICANT CHANGE UP
PH UR: 7 — SIGNIFICANT CHANGE UP (ref 5–8)
PLATELET # BLD AUTO: 178 K/UL — SIGNIFICANT CHANGE UP (ref 150–400)
POTASSIUM SERPL-MCNC: 2.9 MMOL/L — CRITICAL LOW (ref 3.5–5.3)
POTASSIUM SERPL-SCNC: 2.9 MMOL/L — CRITICAL LOW (ref 3.5–5.3)
PROT SERPL-MCNC: 6.8 GM/DL — SIGNIFICANT CHANGE UP (ref 6–8.3)
PROT UR-MCNC: NEGATIVE MG/DL — SIGNIFICANT CHANGE UP
PROTHROM AB SERPL-ACNC: 12.1 SEC — SIGNIFICANT CHANGE UP (ref 9.8–12.7)
RBC # BLD: 4.64 M/UL — SIGNIFICANT CHANGE UP (ref 3.8–5.2)
RBC # FLD: 13.2 % — SIGNIFICANT CHANGE UP (ref 10.3–14.5)
SODIUM SERPL-SCNC: 141 MMOL/L — SIGNIFICANT CHANGE UP (ref 135–145)
SP GR SPEC: 1 — LOW (ref 1.01–1.02)
TROPONIN I SERPL-MCNC: <.015 NG/ML — SIGNIFICANT CHANGE UP (ref 0.01–0.04)
UROBILINOGEN FLD QL: NEGATIVE MG/DL — SIGNIFICANT CHANGE UP
WBC # BLD: 7.01 K/UL — SIGNIFICANT CHANGE UP (ref 3.8–10.5)
WBC # FLD AUTO: 7.01 K/UL — SIGNIFICANT CHANGE UP (ref 3.8–10.5)

## 2018-07-21 PROCEDURE — 71045 X-RAY EXAM CHEST 1 VIEW: CPT | Mod: 26

## 2018-07-21 PROCEDURE — 99285 EMERGENCY DEPT VISIT HI MDM: CPT

## 2018-07-21 PROCEDURE — 71275 CT ANGIOGRAPHY CHEST: CPT | Mod: 26

## 2018-07-21 PROCEDURE — 93010 ELECTROCARDIOGRAM REPORT: CPT

## 2018-07-21 RX ORDER — SODIUM CHLORIDE 9 MG/ML
1000 INJECTION INTRAMUSCULAR; INTRAVENOUS; SUBCUTANEOUS ONCE
Qty: 0 | Refills: 0 | Status: COMPLETED | OUTPATIENT
Start: 2018-07-21 | End: 2018-07-21

## 2018-07-21 RX ORDER — POTASSIUM CHLORIDE 20 MEQ
40 PACKET (EA) ORAL EVERY 4 HOURS
Qty: 0 | Refills: 0 | Status: COMPLETED | OUTPATIENT
Start: 2018-07-21 | End: 2018-07-21

## 2018-07-21 RX ORDER — HEPARIN SODIUM 5000 [USP'U]/ML
5000 INJECTION INTRAVENOUS; SUBCUTANEOUS EVERY 12 HOURS
Qty: 0 | Refills: 0 | Status: DISCONTINUED | OUTPATIENT
Start: 2018-07-21 | End: 2018-07-28

## 2018-07-21 RX ORDER — POTASSIUM CHLORIDE 20 MEQ
10 PACKET (EA) ORAL
Qty: 0 | Refills: 0 | Status: COMPLETED | OUTPATIENT
Start: 2018-07-21 | End: 2018-07-21

## 2018-07-21 RX ADMIN — Medication 40 MILLIEQUIVALENT(S): at 17:29

## 2018-07-21 RX ADMIN — SODIUM CHLORIDE 1000 MILLILITER(S): 9 INJECTION INTRAMUSCULAR; INTRAVENOUS; SUBCUTANEOUS at 12:11

## 2018-07-21 RX ADMIN — Medication 100 MILLIEQUIVALENT(S): at 14:54

## 2018-07-21 RX ADMIN — Medication 100 MILLIEQUIVALENT(S): at 16:09

## 2018-07-21 RX ADMIN — Medication 100 MILLIEQUIVALENT(S): at 13:32

## 2018-07-21 RX ADMIN — Medication 40 MILLIEQUIVALENT(S): at 13:32

## 2018-07-21 NOTE — H&P ADULT - HISTORY OF PRESENT ILLNESS
76 y/o female hx htn, hld, breast cancer s/p surgery last month and 1st chemo 4 days ago, (distant history of ovarian cancer >20 years ago) present with syncopal episode while having blood taken at lab. States they had finished already, pt started to become lightheaded, glazed over,

## 2018-07-21 NOTE — H&P ADULT - NSHPLABSRESULTS_GEN_ALL_CORE
14.6   7.01  )-----------( 178      ( 2018 12:17 )             43.9     07-    141  |  100  |  35<H>  ----------------------------<  79  2.9<LL>   |  36<H>  |  0.94    Ca    8.5      2018 12:47    TPro  6.8  /  Alb  3.1<L>  /  TBili  0.9  /  DBili  x   /  AST  39<H>  /  ALT  65  /  AlkPhos  50  07-    PT/INR - ( 2018 12:17 )   PT: 12.1 sec;   INR: 1.11 ratio         PTT - ( 2018 12:17 )  PTT:22.3 sec  Urinalysis Basic - ( 2018 11:10 )    Color: Yellow / Appearance: Clear / S.005 / pH: x  Gluc: x / Ketone: Negative  / Bili: Negative / Urobili: Negative mg/dL   Blood: x / Protein: Negative mg/dL / Nitrite: Negative   Leuk Esterase: Small / RBC: 0-2 /HPF / WBC 3-5   Sq Epi: x / Non Sq Epi: Moderate / Bacteria: Many

## 2018-07-21 NOTE — ED ADULT TRIAGE NOTE - CHIEF COMPLAINT QUOTE
patient BIBA as per EMS patient had an episode syncope , patient A&Ox3 in triage , denied any chest pain no headache no weakness at the time of triage patient had last chemo on Wednesday , as per EMS FS was 66 after Glucose was 95

## 2018-07-21 NOTE — ED PROVIDER NOTE - OBJECTIVE STATEMENT
76 y/o female hx htn, hld, breast cancer s/p surgery last month and 1st chemo 4 days ago, (distant history of ovarian cancer >20 years ago) present with syncopal episode while having blood taken at lab. States they had finished already, pt started to become lightheaded, glazed over, fs was 66, given glucose and improved to 95. States pt only symptoms were lightheadedness, no cp, sob, abd pain, headache, leg pain/swelling or any other symptoms. No hx dvt/pe or immobilization.    ROS: No fever/chills. No eye pain/changes in vision, No ear pain/sore throat/dysphagia, No chest pain/palpitations. No SOB/cough/. No abdominal pain, N/V/D, no black/bloody bm. No dysuria/frequency/discharge, No headache.    No rashes or breaks in skin. No numbness/tingling/weakness.

## 2018-07-21 NOTE — ED ADULT NURSE REASSESSMENT NOTE - NS ED NURSE REASSESS COMMENT FT1
Patient is A&Ox3. Patient has left mastectomy and previous bowel resection. On cardiac monitor. 20 gauge in right AC. Patient stated that she had a syncopal episode 4 days ago (wednesday)
pt awaiting CTA, MD obtained iv Access to RAC via us. pt tolerated well. VS STABLE AFEBRILE CALL BUCK IN REACH, SAFETY MAINTAINED PT PLACED ON TELE

## 2018-07-21 NOTE — ED PROVIDER NOTE - PROGRESS NOTE DETAILS
Rinku: pt with intermittent pvc's and brief ?asystolic pauses vs severe sinus arrhythmia, no cp, neg cta, trop neg. repleted potassium. will admit to tele, Dr. Barrett, Dr. Boyd notified

## 2018-07-21 NOTE — H&P ADULT - ASSESSMENT
78 y/o female hx htn, hld, breast cancer s/p surgery last month and 1st chemo 4 days ago, (distant history of ovarian cancer >20 years ago) present with syncopal episode while having blood taken at lab. States they had finished already, pt started to become lightheaded, glazed over,

## 2018-07-21 NOTE — ED PROVIDER NOTE - MEDICAL DECISION MAKING DETAILS
syncopal episode shortly after blood tests, hx cancern, ekg with lvh and repol, will do labs, cta chest for possible PE.  fluid, CE x2, reassess. atypical for acs given only syncopal episode without sob or cp or any other symptoms.

## 2018-07-21 NOTE — ED PROVIDER NOTE - PHYSICAL EXAMINATION
Gen: No acute distress, non toxic, comfortable  HEENT: Mucous membranes moist, pink conjunctivae, EOMI  CV: RRR, nl s1/s2. equal pulses b/l.   Resp: CTAB, normal rate and effort  GI: Abdomen soft, NT, ND. No rebound, no guarding  : No CVAT  Neuro: A&O x 3, moving all 4 extremities  MSK: No spine or joint tenderness to palpation. no swelling b/l LE.   Skin: No rashes. intact and perfused.

## 2018-07-22 LAB
ANION GAP SERPL CALC-SCNC: 6 MMOL/L — SIGNIFICANT CHANGE UP (ref 5–17)
BUN SERPL-MCNC: 20 MG/DL — SIGNIFICANT CHANGE UP (ref 7–23)
CALCIUM SERPL-MCNC: 8.5 MG/DL — SIGNIFICANT CHANGE UP (ref 8.5–10.1)
CHLORIDE SERPL-SCNC: 102 MMOL/L — SIGNIFICANT CHANGE UP (ref 96–108)
CO2 SERPL-SCNC: 31 MMOL/L — SIGNIFICANT CHANGE UP (ref 22–31)
CREAT SERPL-MCNC: 0.76 MG/DL — SIGNIFICANT CHANGE UP (ref 0.5–1.3)
CULTURE RESULTS: SIGNIFICANT CHANGE UP
GLUCOSE SERPL-MCNC: 94 MG/DL — SIGNIFICANT CHANGE UP (ref 70–99)
HCT VFR BLD CALC: 44 % — SIGNIFICANT CHANGE UP (ref 34.5–45)
HGB BLD-MCNC: 14.6 G/DL — SIGNIFICANT CHANGE UP (ref 11.5–15.5)
MAGNESIUM SERPL-MCNC: 2 MG/DL — SIGNIFICANT CHANGE UP (ref 1.6–2.6)
MCHC RBC-ENTMCNC: 31.3 PG — SIGNIFICANT CHANGE UP (ref 27–34)
MCHC RBC-ENTMCNC: 33.2 GM/DL — SIGNIFICANT CHANGE UP (ref 32–36)
MCV RBC AUTO: 94.4 FL — SIGNIFICANT CHANGE UP (ref 80–100)
NRBC # BLD: 0 /100 WBCS — SIGNIFICANT CHANGE UP (ref 0–0)
PLATELET # BLD AUTO: 158 K/UL — SIGNIFICANT CHANGE UP (ref 150–400)
POTASSIUM SERPL-MCNC: 3.7 MMOL/L — SIGNIFICANT CHANGE UP (ref 3.5–5.3)
POTASSIUM SERPL-SCNC: 3.7 MMOL/L — SIGNIFICANT CHANGE UP (ref 3.5–5.3)
RBC # BLD: 4.66 M/UL — SIGNIFICANT CHANGE UP (ref 3.8–5.2)
RBC # FLD: 13.2 % — SIGNIFICANT CHANGE UP (ref 10.3–14.5)
SODIUM SERPL-SCNC: 139 MMOL/L — SIGNIFICANT CHANGE UP (ref 135–145)
SPECIMEN SOURCE: SIGNIFICANT CHANGE UP
WBC # BLD: 5.18 K/UL — SIGNIFICANT CHANGE UP (ref 3.8–10.5)
WBC # FLD AUTO: 5.18 K/UL — SIGNIFICANT CHANGE UP (ref 3.8–10.5)

## 2018-07-22 RX ORDER — ACETAMINOPHEN 500 MG
650 TABLET ORAL EVERY 6 HOURS
Qty: 0 | Refills: 0 | Status: DISCONTINUED | OUTPATIENT
Start: 2018-07-22 | End: 2018-07-28

## 2018-07-22 RX ADMIN — HEPARIN SODIUM 5000 UNIT(S): 5000 INJECTION INTRAVENOUS; SUBCUTANEOUS at 18:49

## 2018-07-22 RX ADMIN — Medication 650 MILLIGRAM(S): at 21:18

## 2018-07-22 RX ADMIN — Medication 650 MILLIGRAM(S): at 08:42

## 2018-07-22 RX ADMIN — Medication 650 MILLIGRAM(S): at 22:18

## 2018-07-22 RX ADMIN — Medication 650 MILLIGRAM(S): at 09:12

## 2018-07-22 RX ADMIN — HEPARIN SODIUM 5000 UNIT(S): 5000 INJECTION INTRAVENOUS; SUBCUTANEOUS at 06:08

## 2018-07-22 NOTE — PHYSICAL THERAPY INITIAL EVALUATION ADULT - STANDING BALANCE: DYNAMIC, REHAB EVAL
fair plus/no assistive device H/O: HTN (hypertension)  on medication  H/O: iron deficiency anemia  h/o blood transfusion in 2002  History of myocardial infarction  h/o upper back and left arm pain prompted an angioplasty with stenting x 1 artery in 2000  last echo/ ekg April 2017  Mild persistent asthma in adult without complication  last rescue inhaler used over 6 months ago  Snores    T2DM (type 2 diabetes mellitus)  no daily fingerstick   last A1C  (10+) as per patient CAD (coronary artery disease)  h/o PCI with stenting x1  Dyslipidemia    H/O: HTN (hypertension)  on medication  H/O: iron deficiency anemia  h/o blood transfusion in 2002  History of myocardial infarction  h/o upper back and left arm pain prompted an angioplasty with stenting x 1 artery in 2000  last echo/ ekg April 2017  Mild persistent asthma in adult without complication  last rescue inhaler used over 6 months ago  Snores    Spinal stenosis of lumbar region    T2DM (type 2 diabetes mellitus)  no daily fingerstick   last A1C  (10+) as per patient

## 2018-07-22 NOTE — PHYSICAL THERAPY INITIAL EVALUATION ADULT - ADDITIONAL COMMENTS
Patient reports that she was independent prior to admission and lives alone in an apartment with no stairs to enter and an elevator once inside. She reports that she does not own any assistive devices.

## 2018-07-22 NOTE — PHYSICAL THERAPY INITIAL EVALUATION ADULT - PERTINENT HX OF CURRENT PROBLEM, REHAB EVAL
Patient admitted with syncopal episode while getting blood taken. Patient s/p breast cancer surgery and received 1st chemo treatment 4 days prior to admission. CT angio chest negative for pulmonary embolism.

## 2018-07-23 DIAGNOSIS — R19.7 DIARRHEA, UNSPECIFIED: ICD-10-CM

## 2018-07-23 LAB
ANION GAP SERPL CALC-SCNC: 10 MMOL/L — SIGNIFICANT CHANGE UP (ref 5–17)
BUN SERPL-MCNC: 19 MG/DL — SIGNIFICANT CHANGE UP (ref 7–23)
CALCIUM SERPL-MCNC: 8.7 MG/DL — SIGNIFICANT CHANGE UP (ref 8.5–10.1)
CHLORIDE SERPL-SCNC: 103 MMOL/L — SIGNIFICANT CHANGE UP (ref 96–108)
CO2 SERPL-SCNC: 29 MMOL/L — SIGNIFICANT CHANGE UP (ref 22–31)
CREAT SERPL-MCNC: 0.63 MG/DL — SIGNIFICANT CHANGE UP (ref 0.5–1.3)
GLUCOSE SERPL-MCNC: 80 MG/DL — SIGNIFICANT CHANGE UP (ref 70–99)
HCT VFR BLD CALC: 43.3 % — SIGNIFICANT CHANGE UP (ref 34.5–45)
HGB BLD-MCNC: 14.5 G/DL — SIGNIFICANT CHANGE UP (ref 11.5–15.5)
MCHC RBC-ENTMCNC: 31.2 PG — SIGNIFICANT CHANGE UP (ref 27–34)
MCHC RBC-ENTMCNC: 33.5 GM/DL — SIGNIFICANT CHANGE UP (ref 32–36)
MCV RBC AUTO: 93.1 FL — SIGNIFICANT CHANGE UP (ref 80–100)
NRBC # BLD: 0 /100 WBCS — SIGNIFICANT CHANGE UP (ref 0–0)
PLATELET # BLD AUTO: 149 K/UL — LOW (ref 150–400)
POTASSIUM SERPL-MCNC: 3.7 MMOL/L — SIGNIFICANT CHANGE UP (ref 3.5–5.3)
POTASSIUM SERPL-SCNC: 3.7 MMOL/L — SIGNIFICANT CHANGE UP (ref 3.5–5.3)
RBC # BLD: 4.65 M/UL — SIGNIFICANT CHANGE UP (ref 3.8–5.2)
RBC # FLD: 13.1 % — SIGNIFICANT CHANGE UP (ref 10.3–14.5)
SODIUM SERPL-SCNC: 142 MMOL/L — SIGNIFICANT CHANGE UP (ref 135–145)
WBC # BLD: 3.35 K/UL — LOW (ref 3.8–10.5)
WBC # FLD AUTO: 3.35 K/UL — LOW (ref 3.8–10.5)

## 2018-07-23 PROCEDURE — 93880 EXTRACRANIAL BILAT STUDY: CPT | Mod: 26

## 2018-07-23 RX ORDER — SODIUM CHLORIDE 9 MG/ML
1000 INJECTION, SOLUTION INTRAVENOUS
Qty: 0 | Refills: 0 | Status: DISCONTINUED | OUTPATIENT
Start: 2018-07-23 | End: 2018-07-28

## 2018-07-23 RX ORDER — ONDANSETRON 8 MG/1
4 TABLET, FILM COATED ORAL ONCE
Qty: 0 | Refills: 0 | Status: COMPLETED | OUTPATIENT
Start: 2018-07-23 | End: 2018-07-23

## 2018-07-23 RX ORDER — LOPERAMIDE HCL 2 MG
2 TABLET ORAL
Qty: 0 | Refills: 0 | Status: DISCONTINUED | OUTPATIENT
Start: 2018-07-23 | End: 2018-07-28

## 2018-07-23 RX ADMIN — HEPARIN SODIUM 5000 UNIT(S): 5000 INJECTION INTRAVENOUS; SUBCUTANEOUS at 05:50

## 2018-07-23 RX ADMIN — Medication 650 MILLIGRAM(S): at 11:42

## 2018-07-23 RX ADMIN — Medication 650 MILLIGRAM(S): at 22:05

## 2018-07-23 RX ADMIN — Medication 2 MILLIGRAM(S): at 13:05

## 2018-07-23 RX ADMIN — ONDANSETRON 4 MILLIGRAM(S): 8 TABLET, FILM COATED ORAL at 22:19

## 2018-07-23 RX ADMIN — Medication 650 MILLIGRAM(S): at 21:30

## 2018-07-23 RX ADMIN — Medication 650 MILLIGRAM(S): at 11:20

## 2018-07-23 RX ADMIN — SODIUM CHLORIDE 75 MILLILITER(S): 9 INJECTION, SOLUTION INTRAVENOUS at 14:00

## 2018-07-23 RX ADMIN — HEPARIN SODIUM 5000 UNIT(S): 5000 INJECTION INTRAVENOUS; SUBCUTANEOUS at 17:54

## 2018-07-24 LAB
CULTURE RESULTS: SIGNIFICANT CHANGE UP
SPECIMEN SOURCE: SIGNIFICANT CHANGE UP

## 2018-07-24 RX ADMIN — HEPARIN SODIUM 5000 UNIT(S): 5000 INJECTION INTRAVENOUS; SUBCUTANEOUS at 17:10

## 2018-07-24 RX ADMIN — Medication 650 MILLIGRAM(S): at 23:12

## 2018-07-24 RX ADMIN — Medication 650 MILLIGRAM(S): at 12:18

## 2018-07-24 RX ADMIN — Medication 650 MILLIGRAM(S): at 13:19

## 2018-07-24 RX ADMIN — Medication 650 MILLIGRAM(S): at 23:50

## 2018-07-24 RX ADMIN — HEPARIN SODIUM 5000 UNIT(S): 5000 INJECTION INTRAVENOUS; SUBCUTANEOUS at 05:23

## 2018-07-24 RX ADMIN — SODIUM CHLORIDE 75 MILLILITER(S): 9 INJECTION, SOLUTION INTRAVENOUS at 13:21

## 2018-07-25 DIAGNOSIS — D70.2 OTHER DRUG-INDUCED AGRANULOCYTOSIS: ICD-10-CM

## 2018-07-25 LAB
ANION GAP SERPL CALC-SCNC: 4 MMOL/L — LOW (ref 5–17)
BUN SERPL-MCNC: 9 MG/DL — SIGNIFICANT CHANGE UP (ref 7–23)
C DIFF BY PCR RESULT: SIGNIFICANT CHANGE UP
C DIFF TOX GENS STL QL NAA+PROBE: SIGNIFICANT CHANGE UP
CALCIUM SERPL-MCNC: 8.6 MG/DL — SIGNIFICANT CHANGE UP (ref 8.5–10.1)
CHLORIDE SERPL-SCNC: 106 MMOL/L — SIGNIFICANT CHANGE UP (ref 96–108)
CO2 SERPL-SCNC: 32 MMOL/L — HIGH (ref 22–31)
CREAT SERPL-MCNC: 0.68 MG/DL — SIGNIFICANT CHANGE UP (ref 0.5–1.3)
GLUCOSE SERPL-MCNC: 79 MG/DL — SIGNIFICANT CHANGE UP (ref 70–99)
HCT VFR BLD CALC: 38.5 % — SIGNIFICANT CHANGE UP (ref 34.5–45)
HCT VFR BLD CALC: 39.4 % — SIGNIFICANT CHANGE UP (ref 34.5–45)
HGB BLD-MCNC: 12.9 G/DL — SIGNIFICANT CHANGE UP (ref 11.5–15.5)
HGB BLD-MCNC: 12.9 G/DL — SIGNIFICANT CHANGE UP (ref 11.5–15.5)
MCHC RBC-ENTMCNC: 30.9 PG — SIGNIFICANT CHANGE UP (ref 27–34)
MCHC RBC-ENTMCNC: 31.2 PG — SIGNIFICANT CHANGE UP (ref 27–34)
MCHC RBC-ENTMCNC: 32.7 GM/DL — SIGNIFICANT CHANGE UP (ref 32–36)
MCHC RBC-ENTMCNC: 33.5 GM/DL — SIGNIFICANT CHANGE UP (ref 32–36)
MCV RBC AUTO: 93 FL — SIGNIFICANT CHANGE UP (ref 80–100)
MCV RBC AUTO: 94.5 FL — SIGNIFICANT CHANGE UP (ref 80–100)
NRBC # BLD: 0 /100 WBCS — SIGNIFICANT CHANGE UP (ref 0–0)
NRBC # BLD: 0 /100 WBCS — SIGNIFICANT CHANGE UP (ref 0–0)
PLATELET # BLD AUTO: 154 K/UL — SIGNIFICANT CHANGE UP (ref 150–400)
PLATELET # BLD AUTO: 159 K/UL — SIGNIFICANT CHANGE UP (ref 150–400)
POTASSIUM SERPL-MCNC: 4.5 MMOL/L — SIGNIFICANT CHANGE UP (ref 3.5–5.3)
POTASSIUM SERPL-SCNC: 4.5 MMOL/L — SIGNIFICANT CHANGE UP (ref 3.5–5.3)
RBC # BLD: 4.14 M/UL — SIGNIFICANT CHANGE UP (ref 3.8–5.2)
RBC # BLD: 4.17 M/UL — SIGNIFICANT CHANGE UP (ref 3.8–5.2)
RBC # FLD: 12.8 % — SIGNIFICANT CHANGE UP (ref 10.3–14.5)
RBC # FLD: 12.8 % — SIGNIFICANT CHANGE UP (ref 10.3–14.5)
SODIUM SERPL-SCNC: 142 MMOL/L — SIGNIFICANT CHANGE UP (ref 135–145)
WBC # BLD: 0.51 K/UL — CRITICAL LOW (ref 3.8–10.5)
WBC # BLD: 0.7 K/UL — CRITICAL LOW (ref 3.8–10.5)
WBC # FLD AUTO: 0.51 K/UL — CRITICAL LOW (ref 3.8–10.5)
WBC # FLD AUTO: 0.7 K/UL — CRITICAL LOW (ref 3.8–10.5)

## 2018-07-25 RX ORDER — FILGRASTIM 480MCG/1.6
300 VIAL (ML) INJECTION DAILY
Qty: 0 | Refills: 0 | Status: DISCONTINUED | OUTPATIENT
Start: 2018-07-26 | End: 2018-07-28

## 2018-07-25 RX ORDER — FILGRASTIM 480MCG/1.6
300 VIAL (ML) INJECTION ONCE
Qty: 0 | Refills: 0 | Status: COMPLETED | OUTPATIENT
Start: 2018-07-25 | End: 2018-07-25

## 2018-07-25 RX ADMIN — Medication 300 MICROGRAM(S): at 12:05

## 2018-07-25 RX ADMIN — Medication 650 MILLIGRAM(S): at 12:50

## 2018-07-25 RX ADMIN — HEPARIN SODIUM 5000 UNIT(S): 5000 INJECTION INTRAVENOUS; SUBCUTANEOUS at 05:54

## 2018-07-25 RX ADMIN — Medication 650 MILLIGRAM(S): at 12:19

## 2018-07-25 RX ADMIN — Medication 650 MILLIGRAM(S): at 21:05

## 2018-07-25 RX ADMIN — HEPARIN SODIUM 5000 UNIT(S): 5000 INJECTION INTRAVENOUS; SUBCUTANEOUS at 18:34

## 2018-07-25 RX ADMIN — Medication 650 MILLIGRAM(S): at 20:34

## 2018-07-25 NOTE — CONSULT NOTE ADULT - SUBJECTIVE AND OBJECTIVE BOX
HPI:  HPI:  76 y/o female hx htn, hld, breast cancer s/p surgery last month and 1st chemo 4 days ago, (distant history of ovarian cancer >20 years ago) present with syncopal episode while having blood taken at lab. States they had finished already, pt started to become lightheaded, glazed over, (2018 19:27)      Chief Complaint:  Patient is a 77y old  Female who presents with a chief complaint of syncope (2018 19:27)      Review of Systems:  see above            Social History/Family History  SOCHX:   tobacco,  -  alcohol    FMHX: FA/MO  - contributory       Discussed with:  PMD, Family    Physical Exam:    Vital Signs:  Vital Signs Last 24 Hrs  T(C): 33.3 (2018 21:29), Max: 36.7 (2018 16:22)  T(F): 92 (2018 21:29), Max: 98.1 (2018 16:22)  HR: 83 (2018 21:29) (76 - 115)  BP: 135/78 (2018 21:29) (135/78 - 176/87)  BP(mean): --  RR: 18 (2018 21:29) (18 - 19)  SpO2: 97% (2018 21:29) (97% - 100%)  Daily Height in cm: 165.1 (2018 10:22)    Daily   I&O's Summary        Chest:  Full & symmetric excursion, no increased effort, breath sounds clear  Cardiovascular:  Regular rhythm, S1, S2, no murmur/rub/S3/S4, no carotid/femoral/abdominal bruit, radial/pedal pulses 2+, no edema  Abdomen:  Soft, non-tender, non-distended, normoactive bowel sounds, no HSM      Laboratory:                          14.6   7.01  )-----------( 178      ( 2018 12:17 )             43.9     07-21    141  |  100  |  35<H>  ----------------------------<  79  2.9<LL>   |  36<H>  |  0.94    Ca    8.5      2018 12:47    TPro  6.8  /  Alb  3.1<L>  /  TBili  0.9  /  DBili  x   /  AST  39<H>  /  ALT  65  /  AlkPhos  50  07-21      CARDIAC MARKERS ( 2018 12:47 )  <.015 ng/mL / x     / x     / x     / 0.9 ng/mL      CAPILLARY BLOOD GLUCOSE      POCT Blood Glucose.: 145 mg/dL (2018 10:23)    LIVER FUNCTIONS - ( 2018 12:47 )  Alb: 3.1 g/dL / Pro: 6.8 gm/dL / ALK PHOS: 50 U/L / ALT: 65 U/L / AST: 39 U/L / GGT: x           PT/INR - ( 2018 12:17 )   PT: 12.1 sec;   INR: 1.11 ratio         PTT - ( 2018 12:17 )  PTT:22.3 sec  Urinalysis Basic - ( 2018 11:10 )    Color: Yellow / Appearance: Clear / S.005 / pH: x  Gluc: x / Ketone: Negative  / Bili: Negative / Urobili: Negative mg/dL   Blood: x / Protein: Negative mg/dL / Nitrite: Negative   Leuk Esterase: Small / RBC: 0-2 /HPF / WBC 3-5   Sq Epi: x / Non Sq Epi: Moderate / Bacteria: Many      Assessment:  Syncope  Not likely Cardiac in etiology  Await Carotid doppler and Echocardiogram  Likely due to other underlying issues,   Low KCL, recent chemo
Reason for Consultation: Breast Cancer/Neutropenia    HPI: Patient is a 77y Female seen on consultatioin for the evaluation and management of Breast Cancer/Neutropenia    76 y/o female hx htn, hld, breast cancer s/p surgery last month and 1st chemo 4 days ago, (distant history of ovarian cancer >20 years ago) present with syncopal episode while having blood taken at lab. States they had finished already, pt started to become lightheaded, glazed over    Patient has stage 2 breast cancer s/p 1st cycle of Taxotere/Cytoxan last week      PAST MEDICAL & SURGICAL HISTORY:  Breast cancer        MEDICATIONS  (STANDING):  heparin  Injectable 5000 Unit(s) SubCutaneous every 12 hours  sodium chloride 0.45%. 1000 milliLiter(s) (75 mL/Hr) IV Continuous <Continuous>    MEDICATIONS  (PRN):  acetaminophen   Tablet 650 milliGRAM(s) Oral every 6 hours PRN For Temp greater than 38 C (100.4 F)  acetaminophen   Tablet. 650 milliGRAM(s) Oral every 6 hours PRN Mild Pain (1 - 3)  loperamide 2 milliGRAM(s) Oral two times a day PRN Diarrhea      Allergies    No Known Allergies    Intolerances        SOCIAL HISTORY:    Smoking Status: no  Alcohol: no  Marital Status: yes  Occupation: yes    FAMILY HISTORY: none      Vital Signs Last 24 Hrs  T(C): 36.2 (25 Jul 2018 05:35), Max: 37.6 (24 Jul 2018 11:58)  T(F): 97.1 (25 Jul 2018 05:35), Max: 99.6 (24 Jul 2018 11:58)  HR: 83 (25 Jul 2018 07:02) (62 - 95)  BP: 146/74 (25 Jul 2018 05:35) (135/67 - 146/74)  BP(mean): --  RR: 16 (25 Jul 2018 05:35) (16 - 18)  SpO2: 97% (25 Jul 2018 05:35) (97% - 98%)    PHYSICAL EXAM:    general - AAO x 3  HEENT - No Icterus  CVS - RRR  RS - AE B/L  Abd - soft, NT  Ext - Pulses +        LABS:                        12.9   0.70  )-----------( 154      ( 25 Jul 2018 07:14 )             39.4     07-25    142  |  106  |  9   ----------------------------<  79  4.5   |  32<H>  |  0.68    Ca    8.6      25 Jul 2018 07:14            Culture - Urine (collected 23 Jul 2018 18:00)  Source: .Urine Clean Catch (Midstream)  Final Report (24 Jul 2018 15:26):    Culture grew 3 or more types of organisms which indicate    collection contamination; consider recollection only if clinically    indicated.    Culture - Blood (collected 23 Jul 2018 08:33)  Source: .Blood 1 aer btl rcvd  Preliminary Report (24 Jul 2018 09:00):    No growth to date.    Culture - Blood (collected 22 Jul 2018 23:00)  Source: .Blood Blood-Venous aerobic only  Preliminary Report (24 Jul 2018 01:02):    No growth to date.    Culture - Urine (collected 21 Jul 2018 16:38)  Source: .Urine Clean Catch (Midstream)  Final Report (22 Jul 2018 23:15):    Culture grew 3 or more types of organisms which indicate    collection contamination; consider recollection only if clinically    indicated.        RADIOLOGY & ADDITIONAL STUDIES:

## 2018-07-25 NOTE — CONSULT NOTE ADULT - PROBLEM SELECTOR RECOMMENDATION 9
s/p 1st cycle of Taxotere/Cytoxan  neutropenia - will dose G-CSF till ANC > 1000  -neutropenic precautions  - Supportive care

## 2018-07-26 DIAGNOSIS — I10 ESSENTIAL (PRIMARY) HYPERTENSION: ICD-10-CM

## 2018-07-26 LAB
ANION GAP SERPL CALC-SCNC: 7 MMOL/L — SIGNIFICANT CHANGE UP (ref 5–17)
BUN SERPL-MCNC: 7 MG/DL — SIGNIFICANT CHANGE UP (ref 7–23)
CALCIUM SERPL-MCNC: 8.8 MG/DL — SIGNIFICANT CHANGE UP (ref 8.5–10.1)
CHLORIDE SERPL-SCNC: 104 MMOL/L — SIGNIFICANT CHANGE UP (ref 96–108)
CO2 SERPL-SCNC: 29 MMOL/L — SIGNIFICANT CHANGE UP (ref 22–31)
CREAT SERPL-MCNC: 0.65 MG/DL — SIGNIFICANT CHANGE UP (ref 0.5–1.3)
CULTURE RESULTS: SIGNIFICANT CHANGE UP
GLUCOSE SERPL-MCNC: 130 MG/DL — HIGH (ref 70–99)
HCT VFR BLD CALC: 38.8 % — SIGNIFICANT CHANGE UP (ref 34.5–45)
HGB BLD-MCNC: 12.8 G/DL — SIGNIFICANT CHANGE UP (ref 11.5–15.5)
MCHC RBC-ENTMCNC: 31.1 PG — SIGNIFICANT CHANGE UP (ref 27–34)
MCHC RBC-ENTMCNC: 33 GM/DL — SIGNIFICANT CHANGE UP (ref 32–36)
MCV RBC AUTO: 94.4 FL — SIGNIFICANT CHANGE UP (ref 80–100)
NRBC # BLD: 0 /100 WBCS — SIGNIFICANT CHANGE UP (ref 0–0)
PLATELET # BLD AUTO: 174 K/UL — SIGNIFICANT CHANGE UP (ref 150–400)
POTASSIUM SERPL-MCNC: 3.9 MMOL/L — SIGNIFICANT CHANGE UP (ref 3.5–5.3)
POTASSIUM SERPL-SCNC: 3.9 MMOL/L — SIGNIFICANT CHANGE UP (ref 3.5–5.3)
RBC # BLD: 4.11 M/UL — SIGNIFICANT CHANGE UP (ref 3.8–5.2)
RBC # FLD: 12.7 % — SIGNIFICANT CHANGE UP (ref 10.3–14.5)
SODIUM SERPL-SCNC: 140 MMOL/L — SIGNIFICANT CHANGE UP (ref 135–145)
SPECIMEN SOURCE: SIGNIFICANT CHANGE UP
WBC # BLD: 0.74 K/UL — CRITICAL LOW (ref 3.8–10.5)
WBC # FLD AUTO: 0.74 K/UL — CRITICAL LOW (ref 3.8–10.5)

## 2018-07-26 RX ORDER — SIMVASTATIN 20 MG/1
20 TABLET, FILM COATED ORAL AT BEDTIME
Qty: 0 | Refills: 0 | Status: DISCONTINUED | OUTPATIENT
Start: 2018-07-26 | End: 2018-07-28

## 2018-07-26 RX ORDER — DIPHENHYDRAMINE HCL 50 MG
25 CAPSULE ORAL ONCE
Qty: 0 | Refills: 0 | Status: COMPLETED | OUTPATIENT
Start: 2018-07-26 | End: 2018-07-26

## 2018-07-26 RX ORDER — DEXAMETHASONE 0.5 MG/5ML
4 ELIXIR ORAL
Qty: 0 | Refills: 0 | Status: DISCONTINUED | OUTPATIENT
Start: 2018-07-26 | End: 2018-07-28

## 2018-07-26 RX ORDER — LOSARTAN POTASSIUM 100 MG/1
50 TABLET, FILM COATED ORAL DAILY
Qty: 0 | Refills: 0 | Status: DISCONTINUED | OUTPATIENT
Start: 2018-07-26 | End: 2018-07-27

## 2018-07-26 RX ORDER — CARVEDILOL PHOSPHATE 80 MG/1
12.5 CAPSULE, EXTENDED RELEASE ORAL EVERY 12 HOURS
Qty: 0 | Refills: 0 | Status: DISCONTINUED | OUTPATIENT
Start: 2018-07-26 | End: 2018-07-28

## 2018-07-26 RX ORDER — PANTOPRAZOLE SODIUM 20 MG/1
40 TABLET, DELAYED RELEASE ORAL
Qty: 0 | Refills: 0 | Status: DISCONTINUED | OUTPATIENT
Start: 2018-07-26 | End: 2018-07-28

## 2018-07-26 RX ORDER — ONDANSETRON 8 MG/1
4 TABLET, FILM COATED ORAL EVERY 8 HOURS
Qty: 0 | Refills: 0 | Status: DISCONTINUED | OUTPATIENT
Start: 2018-07-26 | End: 2018-07-28

## 2018-07-26 RX ADMIN — Medication 300 MICROGRAM(S): at 12:29

## 2018-07-26 RX ADMIN — Medication 2 MILLIGRAM(S): at 17:25

## 2018-07-26 RX ADMIN — Medication 1 DROP(S): at 21:09

## 2018-07-26 RX ADMIN — CARVEDILOL PHOSPHATE 12.5 MILLIGRAM(S): 80 CAPSULE, EXTENDED RELEASE ORAL at 17:25

## 2018-07-26 RX ADMIN — HEPARIN SODIUM 5000 UNIT(S): 5000 INJECTION INTRAVENOUS; SUBCUTANEOUS at 05:41

## 2018-07-26 RX ADMIN — Medication 25 MILLIGRAM(S): at 21:09

## 2018-07-26 RX ADMIN — Medication 4 MILLIGRAM(S): at 17:25

## 2018-07-26 RX ADMIN — SODIUM CHLORIDE 75 MILLILITER(S): 9 INJECTION, SOLUTION INTRAVENOUS at 05:41

## 2018-07-26 RX ADMIN — Medication 650 MILLIGRAM(S): at 12:54

## 2018-07-26 RX ADMIN — Medication 650 MILLIGRAM(S): at 13:33

## 2018-07-26 RX ADMIN — SODIUM CHLORIDE 75 MILLILITER(S): 9 INJECTION, SOLUTION INTRAVENOUS at 12:31

## 2018-07-26 RX ADMIN — HEPARIN SODIUM 5000 UNIT(S): 5000 INJECTION INTRAVENOUS; SUBCUTANEOUS at 17:25

## 2018-07-26 RX ADMIN — SIMVASTATIN 20 MILLIGRAM(S): 20 TABLET, FILM COATED ORAL at 21:09

## 2018-07-26 NOTE — PROGRESS NOTE ADULT - PROBLEM SELECTOR PLAN 3
oncology consult
pt to follow up with her oncologist
cont med
s/p chemo last week, spoke to Dr Grayson [phone]

## 2018-07-26 NOTE — PROGRESS NOTE ADULT - PROBLEM SELECTOR PROBLEM 3
Malignant neoplasm of male breast, unspecified estrogen receptor status, unspecified laterality, unspecified site of breast
Malignant neoplasm of male breast, unspecified estrogen receptor status, unspecified laterality, unspecified site of breast
Essential hypertension
Malignant neoplasm of male breast, unspecified estrogen receptor status, unspecified laterality, unspecified site of breast

## 2018-07-27 LAB
BASOPHILS # BLD AUTO: 0 K/UL — SIGNIFICANT CHANGE UP (ref 0–0.2)
BASOPHILS NFR BLD AUTO: 0 % — SIGNIFICANT CHANGE UP (ref 0–2)
EOSINOPHIL # BLD AUTO: 0 K/UL — SIGNIFICANT CHANGE UP (ref 0–0.5)
EOSINOPHIL NFR BLD AUTO: 0 % — SIGNIFICANT CHANGE UP (ref 0–6)
GIANT PLATELETS BLD QL SMEAR: PRESENT — SIGNIFICANT CHANGE UP
HCT VFR BLD CALC: 35.9 % — SIGNIFICANT CHANGE UP (ref 34.5–45)
HGB BLD-MCNC: 12.3 G/DL — SIGNIFICANT CHANGE UP (ref 11.5–15.5)
LG PLATELETS BLD QL AUTO: SLIGHT — SIGNIFICANT CHANGE UP
LYMPHOCYTES # BLD AUTO: 0.64 K/UL — LOW (ref 1–3.3)
LYMPHOCYTES # BLD AUTO: 54 % — HIGH (ref 13–44)
MANUAL SMEAR VERIFICATION: SIGNIFICANT CHANGE UP
MCHC RBC-ENTMCNC: 31.6 PG — SIGNIFICANT CHANGE UP (ref 27–34)
MCHC RBC-ENTMCNC: 34.3 GM/DL — SIGNIFICANT CHANGE UP (ref 32–36)
MCV RBC AUTO: 92.3 FL — SIGNIFICANT CHANGE UP (ref 80–100)
MONOCYTES # BLD AUTO: 0.39 K/UL — SIGNIFICANT CHANGE UP (ref 0–0.9)
MONOCYTES NFR BLD AUTO: 33 % — HIGH (ref 2–14)
MYELOCYTES NFR BLD: 2 % — HIGH (ref 0–0)
NEUTROPHILS # BLD AUTO: 0.05 K/UL — LOW (ref 1.8–7.4)
NEUTROPHILS NFR BLD AUTO: 4 % — LOW (ref 43–77)
NRBC # BLD: 0 /100 — SIGNIFICANT CHANGE UP (ref 0–0)
NRBC # BLD: SIGNIFICANT CHANGE UP /100 WBCS (ref 0–0)
PLAT MORPH BLD: ABNORMAL
PLATELET # BLD AUTO: 199 K/UL — SIGNIFICANT CHANGE UP (ref 150–400)
PLATELET COUNT - ESTIMATE: NORMAL — SIGNIFICANT CHANGE UP
RBC # BLD: 3.89 M/UL — SIGNIFICANT CHANGE UP (ref 3.8–5.2)
RBC # FLD: 12.6 % — SIGNIFICANT CHANGE UP (ref 10.3–14.5)
RBC BLD AUTO: NORMAL — SIGNIFICANT CHANGE UP
VARIANT LYMPHS # BLD: 7 % — HIGH (ref 0–6)
WBC # BLD: 1.18 K/UL — LOW (ref 3.8–10.5)
WBC # FLD AUTO: 1.18 K/UL — LOW (ref 3.8–10.5)

## 2018-07-27 RX ORDER — LOSARTAN POTASSIUM 100 MG/1
100 TABLET, FILM COATED ORAL DAILY
Qty: 0 | Refills: 0 | Status: DISCONTINUED | OUTPATIENT
Start: 2018-07-27 | End: 2018-07-28

## 2018-07-27 RX ADMIN — SIMVASTATIN 20 MILLIGRAM(S): 20 TABLET, FILM COATED ORAL at 21:46

## 2018-07-27 RX ADMIN — Medication 650 MILLIGRAM(S): at 21:47

## 2018-07-27 RX ADMIN — HEPARIN SODIUM 5000 UNIT(S): 5000 INJECTION INTRAVENOUS; SUBCUTANEOUS at 06:24

## 2018-07-27 RX ADMIN — Medication 4 MILLIGRAM(S): at 06:24

## 2018-07-27 RX ADMIN — Medication 4 MILLIGRAM(S): at 18:30

## 2018-07-27 RX ADMIN — SODIUM CHLORIDE 75 MILLILITER(S): 9 INJECTION, SOLUTION INTRAVENOUS at 06:27

## 2018-07-27 RX ADMIN — Medication 300 MICROGRAM(S): at 14:21

## 2018-07-27 RX ADMIN — LOSARTAN POTASSIUM 50 MILLIGRAM(S): 100 TABLET, FILM COATED ORAL at 06:24

## 2018-07-27 RX ADMIN — Medication 650 MILLIGRAM(S): at 22:15

## 2018-07-27 RX ADMIN — PANTOPRAZOLE SODIUM 40 MILLIGRAM(S): 20 TABLET, DELAYED RELEASE ORAL at 06:24

## 2018-07-27 RX ADMIN — CARVEDILOL PHOSPHATE 12.5 MILLIGRAM(S): 80 CAPSULE, EXTENDED RELEASE ORAL at 06:24

## 2018-07-27 RX ADMIN — HEPARIN SODIUM 5000 UNIT(S): 5000 INJECTION INTRAVENOUS; SUBCUTANEOUS at 18:30

## 2018-07-27 NOTE — DIETITIAN INITIAL EVALUATION ADULT. - ETIOLOGY
inadequate protein-energy intake in setting of hx of ovarian cancer, breast cancer, chemo therapy, loose BMs

## 2018-07-27 NOTE — DIETITIAN INITIAL EVALUATION ADULT. - PERTINENT MEDS FT
carvedilol , losartan , heparin , simvastatin , Chemo(filgrastim- sndz injectable x 4 days), pantoprazole , Dexamethasone, IVF sodium chloride 0.45% @ 75 ml/hr

## 2018-07-27 NOTE — DIETITIAN INITIAL EVALUATION ADULT. - OTHER INFO
Pt seen for length of stay .  Pt reports fair to good appetite @ present & PTA.  Pt stated that she ate frequent meals @ home.  Pt reports chemo therapy PTA, went for blood work and passed out.  Pt reports hx of ovarian cancer, breast CA, s/p mastectomy.  Pt was using Lactaid milk @ home.

## 2018-07-27 NOTE — DIETITIAN INITIAL EVALUATION ADULT. - PERTINENT LABORATORY DATA
07-27  Hgb 12.3 g/dL Hct 35.9 % 07-26 Na140 mmol/L Glu 130 mg/dL<H> K+ 3.9 mmol/L Cr  0.65 mg/dL BUN 7 mg/dL 07-21 Alb 3.1 g/dL<L>07-21 ALT 65 U/L AST 39 U/L<H> Alkaline Phosphatase 50 U/L

## 2018-07-27 NOTE — CHART NOTE - NSCHARTNOTEFT_GEN_A_CORE
Upon Nutritional Assessment by the Registered Dietitian your patient was determined to meet criteria / has evidence of the following diagnosis/diagnoses:          [ ]  Mild Protein Calorie Malnutrition        [ x]  Moderate Protein Calorie Malnutrition        [ ] Severe Protein Calorie Malnutrition        [ ] Unspecified Protein Calorie Malnutrition        [ ] Underweight / BMI <19        [ ] Morbid Obesity / BMI > 40      Findings as based on:  •  Comprehensive nutrition assessment and consultation  •  Calorie counts (nutrient intake analysis)  •  Food acceptance and intake status from observations by staff  •  Follow up  •  Patient education  •  Intervention secondary to interdisciplinary rounds  •   concerns      Treatment:    The following diet has been recommended:  low fiber, lactose restricted, Ensure Clear 8 oz 2x/day (480 ruy, 16 gm pro)       PROVIDER Section:     By signing this assessment you are acknowledging and agree with the diagnosis/diagnoses assigned by the Registered Dietitian    Comments:

## 2018-07-27 NOTE — DIETITIAN INITIAL EVALUATION ADULT. - PHYSICAL APPEARANCE
other (specify)/BMI=20.6(07-21), Nutrition focused physical exam conducted; Subcutaneous fat Exam;  [WNL ]  Orbital fat pads region,  [  WNL]Buccal fat region,  [ mild ]triceps region, [WNL  ]ribs region.  Muscle Exam; [ mild ]temples region, [ mild ]clavicle region, [WNL  ]shoulder region, [ WNL ]Scapula region, [ WNL ]Interosseous region, [mild  ]thigh region, [  mild]Calf region

## 2018-07-27 NOTE — DIETITIAN INITIAL EVALUATION ADULT. - ENERGY NEEDS
Height (cm): 165.1 (07-21)  Weight (kg): 56.1 (07-21)  BMI (kg/m2): 20.6 (07-21)  IBW: 56.6 kg         % IBW: 99%         UBW: 65.7 kg            %UBW:86% Height (cm): 165.1 (07-21)  Weight (kg): 56.1 (07-21)  BMI (kg/m2): 20.6 (07-21)  IBW: 56.6 kg         % IBW: 99%         UBW: 65.7 kg            %UBW:86%  ? current wt. accuracy, ? wt. gain of 14.1 kg, pt w/o edema

## 2018-07-27 NOTE — DIETITIAN INITIAL EVALUATION ADULT. - NS AS NUTRI INTERV MEALS SNACK
Fiber - modified diet/Other (specify)/In view of reported diarrhea after eating, recommend low fiber, lactose restricted diet Other (specify)/Fiber - modified diet/In view of reported diarrhea after eating, recommend low fiber, lactose restricted diet, will provide neutropenic diet

## 2018-07-28 ENCOUNTER — TRANSCRIPTION ENCOUNTER (OUTPATIENT)
Age: 78
End: 2018-07-28

## 2018-07-28 VITALS
RESPIRATION RATE: 17 BRPM | TEMPERATURE: 98 F | SYSTOLIC BLOOD PRESSURE: 126 MMHG | OXYGEN SATURATION: 96 % | DIASTOLIC BLOOD PRESSURE: 78 MMHG | HEART RATE: 96 BPM

## 2018-07-28 DIAGNOSIS — C50.919 MALIGNANT NEOPLASM OF UNSPECIFIED SITE OF UNSPECIFIED FEMALE BREAST: ICD-10-CM

## 2018-07-28 LAB
ANION GAP SERPL CALC-SCNC: 7 MMOL/L — SIGNIFICANT CHANGE UP (ref 5–17)
BUN SERPL-MCNC: 12 MG/DL — SIGNIFICANT CHANGE UP (ref 7–23)
CALCIUM SERPL-MCNC: 7.7 MG/DL — LOW (ref 8.5–10.1)
CHLORIDE SERPL-SCNC: 106 MMOL/L — SIGNIFICANT CHANGE UP (ref 96–108)
CO2 SERPL-SCNC: 27 MMOL/L — SIGNIFICANT CHANGE UP (ref 22–31)
CREAT SERPL-MCNC: 0.66 MG/DL — SIGNIFICANT CHANGE UP (ref 0.5–1.3)
CULTURE RESULTS: SIGNIFICANT CHANGE UP
CULTURE RESULTS: SIGNIFICANT CHANGE UP
GLUCOSE SERPL-MCNC: 87 MG/DL — SIGNIFICANT CHANGE UP (ref 70–99)
HCT VFR BLD CALC: 33.6 % — LOW (ref 34.5–45)
HGB BLD-MCNC: 11 G/DL — LOW (ref 11.5–15.5)
MCHC RBC-ENTMCNC: 30.8 PG — SIGNIFICANT CHANGE UP (ref 27–34)
MCHC RBC-ENTMCNC: 32.7 GM/DL — SIGNIFICANT CHANGE UP (ref 32–36)
MCV RBC AUTO: 94.1 FL — SIGNIFICANT CHANGE UP (ref 80–100)
NRBC # BLD: 7 /100 WBCS — HIGH (ref 0–0)
PLATELET # BLD AUTO: 197 K/UL — SIGNIFICANT CHANGE UP (ref 150–400)
POTASSIUM SERPL-MCNC: 3.6 MMOL/L — SIGNIFICANT CHANGE UP (ref 3.5–5.3)
POTASSIUM SERPL-SCNC: 3.6 MMOL/L — SIGNIFICANT CHANGE UP (ref 3.5–5.3)
RBC # BLD: 3.57 M/UL — LOW (ref 3.8–5.2)
RBC # FLD: 12.9 % — SIGNIFICANT CHANGE UP (ref 10.3–14.5)
SODIUM SERPL-SCNC: 140 MMOL/L — SIGNIFICANT CHANGE UP (ref 135–145)
SPECIMEN SOURCE: SIGNIFICANT CHANGE UP
SPECIMEN SOURCE: SIGNIFICANT CHANGE UP
WBC # BLD: 6.06 K/UL — SIGNIFICANT CHANGE UP (ref 3.8–10.5)
WBC # FLD AUTO: 6.06 K/UL — SIGNIFICANT CHANGE UP (ref 3.8–10.5)

## 2018-07-28 RX ADMIN — HEPARIN SODIUM 5000 UNIT(S): 5000 INJECTION INTRAVENOUS; SUBCUTANEOUS at 05:52

## 2018-07-28 RX ADMIN — CARVEDILOL PHOSPHATE 12.5 MILLIGRAM(S): 80 CAPSULE, EXTENDED RELEASE ORAL at 05:52

## 2018-07-28 RX ADMIN — Medication 300 MICROGRAM(S): at 12:13

## 2018-07-28 RX ADMIN — PANTOPRAZOLE SODIUM 40 MILLIGRAM(S): 20 TABLET, DELAYED RELEASE ORAL at 05:52

## 2018-07-28 RX ADMIN — LOSARTAN POTASSIUM 100 MILLIGRAM(S): 100 TABLET, FILM COATED ORAL at 05:52

## 2018-07-28 RX ADMIN — SODIUM CHLORIDE 75 MILLILITER(S): 9 INJECTION, SOLUTION INTRAVENOUS at 08:07

## 2018-07-28 RX ADMIN — Medication 4 MILLIGRAM(S): at 05:52

## 2018-07-28 NOTE — PROGRESS NOTE ADULT - PROBLEM SELECTOR PLAN 2
cont tx
cardiology follow up
resolved
S/P Chemotherapy .  Neupogen.
cont med
echo and carotid doppler
echo and carotid doppler pending
resolved

## 2018-07-28 NOTE — DISCHARGE NOTE ADULT - PATIENT PORTAL LINK FT
You can access the [facility], offered by [facility], by registering with the following website: [email or web address]

## 2018-07-28 NOTE — PROGRESS NOTE ADULT - PROBLEM SELECTOR PROBLEM 2
Malignant neoplasm of male breast, unspecified estrogen receptor status, unspecified laterality, unspecified site of breast
Syncope, unspecified syncope type
Syncope, unspecified syncope type
Diarrhea, unspecified type
Essential hypertension
Other drug-induced neutropenia
Syncope, unspecified syncope type
Syncope, unspecified syncope type

## 2018-07-28 NOTE — PROGRESS NOTE ADULT - SUBJECTIVE AND OBJECTIVE BOX
Patient is a 77y old  Female who presents with a chief complaint of syncope (21 Jul 2018 19:27)      INTERVAL HPI/OVERNIGHT EVENTS:  pt has no diarrhea today, feeling better  MEDICATIONS  (STANDING):  heparin  Injectable 5000 Unit(s) SubCutaneous every 12 hours  sodium chloride 0.45%. 1000 milliLiter(s) (75 mL/Hr) IV Continuous <Continuous>    MEDICATIONS  (PRN):  acetaminophen   Tablet 650 milliGRAM(s) Oral every 6 hours PRN For Temp greater than 38 C (100.4 F)  acetaminophen   Tablet. 650 milliGRAM(s) Oral every 6 hours PRN Mild Pain (1 - 3)  loperamide 2 milliGRAM(s) Oral two times a day PRN Diarrhea      Allergies    No Known Allergies    Intolerances        REVIEW OF SYSTEMS:  CONSTITUTIONAL: No fever, weight loss, or fatigue  EYES: No eye pain, visual disturbances, or discharge  ENMT:  No difficulty hearing, tinnitus, vertigo; No sinus or throat pain  NECK: No pain or stiffness  BREASTS: No pain, masses, or nipple discharge  RESPIRATORY: No cough, wheezing, chills or hemoptysis; No shortness of breath  CARDIOVASCULAR: No chest pain, palpitations, dizziness, or leg swelling  GASTROINTESTINAL: No abdominal or epigastric pain. No nausea, vomiting, or hematemesis; No diarrhea or constipation. No melena or hematochezia.  GENITOURINARY: No dysuria, frequency, hematuria, or incontinence  NEUROLOGICAL: No headaches, memory loss, loss of strength, numbness, or tremors  SKIN: No itching, burning, rashes, or lesions   LYMPH NODES: No enlarged glands  ENDOCRINE: No heat or cold intolerance; No hair loss  MUSCULOSKELETAL: No joint pain or swelling; No muscle, back, or extremity pain  PSYCHIATRIC: No depression, anxiety, mood swings, or difficulty sleeping  HEME/LYMPH: No easy bruising, or bleeding gums  ALLERGY AND IMMUNOLOGIC: No hives or eczema    Vital Signs Last 24 Hrs  T(C): 37 (24 Jul 2018 00:32), Max: 37 (23 Jul 2018 17:30)  T(F): 98.6 (24 Jul 2018 00:32), Max: 98.6 (23 Jul 2018 17:30)  HR: 107 (24 Jul 2018 00:32) (89 - 107)  BP: 127/74 (24 Jul 2018 00:32) (126/70 - 151/85)  BP(mean): --  RR: 18 (24 Jul 2018 00:32) (18 - 20)  SpO2: 95% (24 Jul 2018 00:32) (95% - 99%)    PHYSICAL EXAM:  GENERAL: NAD, well-groomed, well-developed  HEAD:  Atraumatic, Normocephalic  EYES: EOMI, PERRLA, conjunctiva and sclera clear  ENMT: No tonsillar erythema, exudates, or enlargement; Moist mucous membranes, Good dentition, No lesions  NECK: Supple, No JVD, Normal thyroid  NERVOUS SYSTEM:  Alert & Oriented X3, Good concentration; Motor Strength 5/5 B/L upper and lower extremities; DTRs 2+ intact and symmetric  CHEST/LUNG: Clear to percussion bilaterally; No rales, rhonchi, wheezing, or rubs  HEART: Regular rate and rhythm; No murmurs, rubs, or gallops  ABDOMEN: Soft, Nontender, Nondistended; Bowel sounds present  EXTREMITIES:  2+ Peripheral Pulses, No clubbing, cyanosis, or edema  LYMPH: No lymphadenopathy noted  SKIN: No rashes or lesions    LABS:                        14.5   3.35  )-----------( 149      ( 23 Jul 2018 06:24 )             43.3     07-23    142  |  103  |  19  ----------------------------<  80  3.7   |  29  |  0.63    Ca    8.7      23 Jul 2018 06:24  Mg     2.0     07-22          CAPILLARY BLOOD GLUCOSE        CULTURES:  Culture Results:   No growth to date. (07-23 @ 08:33)  Culture Results:   No growth to date. (07-22 @ 23:00)  Culture Results:   Culture grew 3 or more types of organisms which indicate  collection contamination; consider recollection only if clinically  indicated. (07-21 @ 16:38)    HEMOGLOBIN A1C:    CHOLESTEROL:        RADIOLOGY & ADDITIONAL TESTS:
Patient is a 77y old  Female who presents with a chief complaint of syncope (21 Jul 2018 19:27)      INTERVAL HPI/OVERNIGHT EVENTS:  pt is still having diarrhea and wants to be transfer to [facility] where she has her doctors  MEDICATIONS  (STANDING):  heparin  Injectable 5000 Unit(s) SubCutaneous every 12 hours  sodium chloride 0.45%. 1000 milliLiter(s) (75 mL/Hr) IV Continuous <Continuous>    MEDICATIONS  (PRN):  acetaminophen   Tablet 650 milliGRAM(s) Oral every 6 hours PRN For Temp greater than 38 C (100.4 F)  acetaminophen   Tablet. 650 milliGRAM(s) Oral every 6 hours PRN Mild Pain (1 - 3)  loperamide 2 milliGRAM(s) Oral two times a day PRN Diarrhea      Allergies    No Known Allergies    Intolerances        REVIEW OF SYSTEMS:  CONSTITUTIONAL: No fever, weight loss, or fatigue  EYES: No eye pain, visual disturbances, or discharge  ENMT:  No difficulty hearing, tinnitus, vertigo; No sinus or throat pain  NECK: No pain or stiffness  BREASTS: No pain, masses, or nipple discharge  RESPIRATORY: No cough, wheezing, chills or hemoptysis; No shortness of breath  CARDIOVASCULAR: No chest pain, palpitations, dizziness, or leg swelling  GASTROINTESTINAL: No abdominal or epigastric pain. No nausea, vomiting, or hematemesis; No diarrhea or constipation. No melena or hematochezia.  GENITOURINARY: No dysuria, frequency, hematuria, or incontinence  NEUROLOGICAL: No headaches, memory loss, loss of strength, numbness, or tremors  SKIN: No itching, burning, rashes, or lesions   LYMPH NODES: No enlarged glands  ENDOCRINE: No heat or cold intolerance; No hair loss  MUSCULOSKELETAL: No joint pain or swelling; No muscle, back, or extremity pain  PSYCHIATRIC: No depression, anxiety, mood swings, or difficulty sleeping  HEME/LYMPH: No easy bruising, or bleeding gums  ALLERGY AND IMMUNOLOGIC: No hives or eczema    Vital Signs Last 24 Hrs  T(C): 36.2 (25 Jul 2018 05:35), Max: 37.6 (24 Jul 2018 11:58)  T(F): 97.1 (25 Jul 2018 05:35), Max: 99.6 (24 Jul 2018 11:58)  HR: 83 (25 Jul 2018 07:02) (62 - 95)  BP: 146/74 (25 Jul 2018 05:35) (135/67 - 146/74)  BP(mean): --  RR: 16 (25 Jul 2018 05:35) (16 - 18)  SpO2: 97% (25 Jul 2018 05:35) (97% - 98%)    PHYSICAL EXAM:  GENERAL: NAD, well-groomed, well-developed  HEAD:  Atraumatic, Normocephalic  EYES: EOMI, PERRLA, conjunctiva and sclera clear  ENMT: No tonsillar erythema, exudates, or enlargement; Moist mucous membranes, Good dentition, No lesions  NECK: Supple, No JVD, Normal thyroid  NERVOUS SYSTEM:  Alert & Oriented X3, Good concentration; Motor Strength 5/5 B/L upper and lower extremities; DTRs 2+ intact and symmetric  CHEST/LUNG: Clear to percussion bilaterally; No rales, rhonchi, wheezing, or rubs  HEART: Regular rate and rhythm; No murmurs, rubs, or gallops  ABDOMEN: Soft, Nontender, Nondistended; Bowel sounds present  EXTREMITIES:  2+ Peripheral Pulses, No clubbing, cyanosis, or edema  LYMPH: No lymphadenopathy noted  SKIN: No rashes or lesions    LABS:                        12.9   0.70  )-----------( 154      ( 25 Jul 2018 07:14 )             39.4     07-25    142  |  106  |  9   ----------------------------<  79  4.5   |  32<H>  |  0.68    Ca    8.6      25 Jul 2018 07:14          CAPILLARY BLOOD GLUCOSE        CULTURES:  Culture Results:   Culture grew 3 or more types of organisms which indicate  collection contamination; consider recollection only if clinically  indicated. (07-23 @ 18:00)  Culture Results:   No growth to date. (07-23 @ 08:33)  Culture Results:   No growth to date. (07-22 @ 23:00)    HEMOGLOBIN A1C:    CHOLESTEROL:        RADIOLOGY & ADDITIONAL TESTS:
Assessment:  Syncope  Not likely Cardiac in etiology    Likely due to other underlying issues,   Low KCL, recent chemo  Carotid and echo noted
Assessment:  Syncope  Not likely Cardiac in etiology    Likely due to other underlying issues,   Low KCL, recent chemo  Carotid and echo noted
Assessment:  Syncope  Not likely Cardiac in etiology  Await Carotid doppler and Echocardiogram  Likely due to other underlying issues,   Low KCL, recent chemo
Assessment:  Syncope  Not likely Cardiac in etiology  Likely due to other underlying issues,    recent chemo  Neutropenic   Carotid and echo noted  Improved for
Assessment:  Syncope  Not likely Cardiac in etiology  Likely due to other underlying issues,   Low KCL, recent chemo  Now Neutropenic   Carotid and echo noted
INTERVAL History:  Left mastectomy.  S/P Chemotherapy     Allergies    No Known Allergies    Intolerances        MEDICATIONS  (STANDING):  carvedilol 12.5 milliGRAM(s) Oral every 12 hours  dexamethasone     Tablet 4 milliGRAM(s) Oral two times a day  filgrastim-sndz Injectable 300 MICROGram(s) SubCutaneous daily  heparin  Injectable 5000 Unit(s) SubCutaneous every 12 hours  losartan 100 milliGRAM(s) Oral daily  pantoprazole    Tablet 40 milliGRAM(s) Oral before breakfast  simvastatin 20 milliGRAM(s) Oral at bedtime  sodium chloride 0.45%. 1000 milliLiter(s) (75 mL/Hr) IV Continuous <Continuous>    MEDICATIONS  (PRN):  acetaminophen   Tablet 650 milliGRAM(s) Oral every 6 hours PRN For Temp greater than 38 C (100.4 F)  acetaminophen   Tablet. 650 milliGRAM(s) Oral every 6 hours PRN Mild Pain (1 - 3)  artificial  tears Solution 1 Drop(s) Both EYES four times a day PRN Dry Eyes  loperamide 2 milliGRAM(s) Oral two times a day PRN Diarrhea  ondansetron    Tablet 4 milliGRAM(s) Oral every 8 hours PRN Nausea and/or Vomiting      Vital Signs Last 24 Hrs  T(C): 36.8 (28 Jul 2018 05:37), Max: 36.9 (28 Jul 2018 00:18)  T(F): 98.2 (28 Jul 2018 05:37), Max: 98.5 (28 Jul 2018 00:18)  HR: 89 (28 Jul 2018 05:37) (74 - 97)  BP: 137/81 (28 Jul 2018 05:37) (114/84 - 149/79)  BP(mean): --  RR: 18 (28 Jul 2018 05:37) (18 - 18)  SpO2: 96% (28 Jul 2018 05:37) (95% - 100%)    PHYSICAL EXAM:      EYES: EOMI, PERRLA, conjunctiva and sclera clear  NECK: Supple, No JVD, Normal thyroid  CHEST/LUNG: Clear to percussion bilaterally; No rales, rhonchi,   HEART: Regular rate and rhythm;   ABDOMEN: Soft, Nontender.  LYMPH: No lymphadenopathy noted        LABS:                        12.3   1.18  )-----------( 199      ( 27 Jul 2018 09:03 )             35.9     07-28    140  |  106  |  12  ----------------------------<  87  3.6   |  27  |  0.66    Ca    7.7<L>      28 Jul 2018 08:08              RADIOLOGY & ADDITIONAL STUDIES:    PATHOLOGY:
INTERVAL History:  Weak  Breast Cancer.  S/P Chemotherapy   Neutropenia.    Allergies    No Known Allergies    Intolerances        MEDICATIONS  (STANDING):  carvedilol 12.5 milliGRAM(s) Oral every 12 hours  dexamethasone     Tablet 4 milliGRAM(s) Oral two times a day  filgrastim-sndz Injectable 300 MICROGram(s) SubCutaneous daily  heparin  Injectable 5000 Unit(s) SubCutaneous every 12 hours  losartan 100 milliGRAM(s) Oral daily  pantoprazole    Tablet 40 milliGRAM(s) Oral before breakfast  simvastatin 20 milliGRAM(s) Oral at bedtime  sodium chloride 0.45%. 1000 milliLiter(s) (75 mL/Hr) IV Continuous <Continuous>    MEDICATIONS  (PRN):  acetaminophen   Tablet 650 milliGRAM(s) Oral every 6 hours PRN For Temp greater than 38 C (100.4 F)  acetaminophen   Tablet. 650 milliGRAM(s) Oral every 6 hours PRN Mild Pain (1 - 3)  artificial  tears Solution 1 Drop(s) Both EYES four times a day PRN Dry Eyes  loperamide 2 milliGRAM(s) Oral two times a day PRN Diarrhea  ondansetron    Tablet 4 milliGRAM(s) Oral every 8 hours PRN Nausea and/or Vomiting      Vital Signs Last 24 Hrs  T(C): 36.7 (27 Jul 2018 12:10), Max: 37 (26 Jul 2018 17:45)  T(F): 98 (27 Jul 2018 12:10), Max: 98.6 (26 Jul 2018 17:45)  HR: 74 (27 Jul 2018 12:10) (74 - 98)  BP: 149/79 (27 Jul 2018 12:10) (135/80 - 166/89)  BP(mean): --  RR: 18 (27 Jul 2018 12:10) (18 - 18)  SpO2: 100% (27 Jul 2018 12:10) (93% - 100%)    PHYSICAL EXAM:  Left Mastectomy.    EYES: EOMI, PERRLA, conjunctiva and sclera clear  NECK: Supple, No JVD, Normal thyroid  CHEST/LUNG: Clear to percussion bilaterally; No rales, rhonchi,   HEART: Regular rate and rhythm;   ABDOMEN: Soft, Nontender.          LABS:                        12.3   1.18  )-----------( 199      ( 27 Jul 2018 09:03 )             35.9     07-26    140  |  104  |  7   ----------------------------<  130<H>  3.9   |  29  |  0.65    Ca    8.8      26 Jul 2018 10:39              RADIOLOGY & ADDITIONAL STUDIES:    PATHOLOGY:
Patient is a 77y old  Female who presents with a chief complaint of syncope (2018 19:27)      INTERVAL HPI/OVERNIGHT EVENTS:  pt stated she is feeling better  MEDICATIONS  (STANDING):  heparin  Injectable 5000 Unit(s) SubCutaneous every 12 hours    MEDICATIONS  (PRN):  acetaminophen   Tablet. 650 milliGRAM(s) Oral every 6 hours PRN Mild Pain (1 - 3)      Allergies    No Known Allergies    Intolerances        REVIEW OF SYSTEMS:  CONSTITUTIONAL: No fever, weight loss, or fatigue  EYES: No eye pain, visual disturbances, or discharge  ENMT:  No difficulty hearing, tinnitus, vertigo; No sinus or throat pain  NECK: No pain or stiffness  BREASTS: No pain, masses, or nipple discharge  RESPIRATORY: No cough, wheezing, chills or hemoptysis; No shortness of breath  CARDIOVASCULAR: No chest pain, palpitations, dizziness, or leg swelling  GASTROINTESTINAL: No abdominal or epigastric pain. No nausea, vomiting, or hematemesis; No diarrhea or constipation. No melena or hematochezia.  GENITOURINARY: No dysuria, frequency, hematuria, or incontinence  NEUROLOGICAL: No headaches, memory loss, loss of strength, numbness, or tremors  SKIN: No itching, burning, rashes, or lesions   LYMPH NODES: No enlarged glands  ENDOCRINE: No heat or cold intolerance; No hair loss  MUSCULOSKELETAL: No joint pain or swelling; No muscle, back, or extremity pain  PSYCHIATRIC: No depression, anxiety, mood swings, or difficulty sleeping  HEME/LYMPH: No easy bruising, or bleeding gums  ALLERGY AND IMMUNOLOGIC: No hives or eczema    Vital Signs Last 24 Hrs  T(C): 36.9 (2018 06:43), Max: 36.9 (2018 01:18)  T(F): 98.5 (2018 06:43), Max: 98.5 (2018 06:43)  HR: 94 (2018 09:20) (76 - 94)  BP: 140/81 (2018 09:20) (135/78 - 176/87)  BP(mean): --  RR: 18 (2018 09:20) (18 - 19)  SpO2: 96% (2018 09:20) (96% - 100%)    PHYSICAL EXAM:  GENERAL: NAD, well-groomed, well-developed  HEAD:  Atraumatic, Normocephalic  EYES: EOMI, PERRLA, conjunctiva and sclera clear  ENMT: No tonsillar erythema, exudates, or enlargement; Moist mucous membranes, Good dentition, No lesions  NECK: Supple, No JVD, Normal thyroid  NERVOUS SYSTEM:  Alert & Oriented X3, Good concentration; Motor Strength 5/5 B/L upper and lower extremities; DTRs 2+ intact and symmetric  CHEST/LUNG: Clear to percussion bilaterally; No rales, rhonchi, wheezing, or rubs  HEART: Regular rate and rhythm; No murmurs, rubs, or gallops  ABDOMEN: Soft, Nontender, Nondistended; Bowel sounds present  EXTREMITIES:  2+ Peripheral Pulses, No clubbing, cyanosis, or edema  LYMPH: No lymphadenopathy noted  SKIN: No rashes or lesions    LABS:                        14.6   7.01  )-----------( 178      ( 2018 12:17 )             43.9     07-    141  |  100  |  35<H>  ----------------------------<  79  2.9<LL>   |  36<H>  |  0.94    Ca    8.5      2018 12:47    TPro  6.8  /  Alb  3.1<L>  /  TBili  0.9  /  DBili  x   /  AST  39<H>  /  ALT  65  /  AlkPhos  50  07-    PT/INR - ( 2018 12:17 )   PT: 12.1 sec;   INR: 1.11 ratio         PTT - ( 2018 12:17 )  PTT:22.3 sec  Urinalysis Basic - ( 2018 11:10 )    Color: Yellow / Appearance: Clear / S.005 / pH: x  Gluc: x / Ketone: Negative  / Bili: Negative / Urobili: Negative mg/dL   Blood: x / Protein: Negative mg/dL / Nitrite: Negative   Leuk Esterase: Small / RBC: 0-2 /HPF / WBC 3-5   Sq Epi: x / Non Sq Epi: Moderate / Bacteria: Many      CAPILLARY BLOOD GLUCOSE        CULTURES:    HEMOGLOBIN A1C:    CHOLESTEROL:        RADIOLOGY & ADDITIONAL TESTS:
Patient is a 77y old  Female who presents with a chief complaint of syncope (21 Jul 2018 19:27)      INTERVAL HPI/OVERNIGHT EVENTS:  pt is doing better  MEDICATIONS  (STANDING):  carvedilol 12.5 milliGRAM(s) Oral every 12 hours  dexamethasone     Tablet 4 milliGRAM(s) Oral two times a day  filgrastim-sndz Injectable 300 MICROGram(s) SubCutaneous daily  heparin  Injectable 5000 Unit(s) SubCutaneous every 12 hours  losartan 100 milliGRAM(s) Oral daily  pantoprazole    Tablet 40 milliGRAM(s) Oral before breakfast  simvastatin 20 milliGRAM(s) Oral at bedtime  sodium chloride 0.45%. 1000 milliLiter(s) (75 mL/Hr) IV Continuous <Continuous>    MEDICATIONS  (PRN):  acetaminophen   Tablet 650 milliGRAM(s) Oral every 6 hours PRN For Temp greater than 38 C (100.4 F)  acetaminophen   Tablet. 650 milliGRAM(s) Oral every 6 hours PRN Mild Pain (1 - 3)  artificial  tears Solution 1 Drop(s) Both EYES four times a day PRN Dry Eyes  loperamide 2 milliGRAM(s) Oral two times a day PRN Diarrhea  ondansetron    Tablet 4 milliGRAM(s) Oral every 8 hours PRN Nausea and/or Vomiting      Allergies    No Known Allergies    Intolerances        REVIEW OF SYSTEMS:  CONSTITUTIONAL: No fever, weight loss, or fatigue  EYES: No eye pain, visual disturbances, or discharge  ENMT:  No difficulty hearing, tinnitus, vertigo; No sinus or throat pain  NECK: No pain or stiffness  BREASTS: No pain, masses, or nipple discharge  RESPIRATORY: No cough, wheezing, chills or hemoptysis; No shortness of breath  CARDIOVASCULAR: No chest pain, palpitations, dizziness, or leg swelling  GASTROINTESTINAL: No abdominal or epigastric pain. No nausea, vomiting, or hematemesis; No diarrhea or constipation. No melena or hematochezia.  GENITOURINARY: No dysuria, frequency, hematuria, or incontinence  NEUROLOGICAL: No headaches, memory loss, loss of strength, numbness, or tremors  SKIN: No itching, burning, rashes, or lesions   LYMPH NODES: No enlarged glands  ENDOCRINE: No heat or cold intolerance; No hair loss  MUSCULOSKELETAL: No joint pain or swelling; No muscle, back, or extremity pain  PSYCHIATRIC: No depression, anxiety, mood swings, or difficulty sleeping  HEME/LYMPH: No easy bruising, or bleeding gums  ALLERGY AND IMMUNOLOGIC: No hives or eczema    Vital Signs Last 24 Hrs  T(C): 36.8 (27 Jul 2018 05:17), Max: 37 (26 Jul 2018 17:45)  T(F): 98.2 (27 Jul 2018 05:17), Max: 98.6 (26 Jul 2018 17:45)  HR: 78 (27 Jul 2018 05:17) (78 - 106)  BP: 166/89 (27 Jul 2018 05:17) (135/80 - 166/89)  BP(mean): --  RR: 18 (27 Jul 2018 05:17) (18 - 18)  SpO2: 100% (27 Jul 2018 05:17) (93% - 100%)    PHYSICAL EXAM:  GENERAL: NAD, well-groomed, well-developed  HEAD:  Atraumatic, Normocephalic  EYES: EOMI, PERRLA, conjunctiva and sclera clear  ENMT: No tonsillar erythema, exudates, or enlargement; Moist mucous membranes, Good dentition, No lesions  NECK: Supple, No JVD, Normal thyroid  NERVOUS SYSTEM:  Alert & Oriented X3, Good concentration; Motor Strength 5/5 B/L upper and lower extremities; DTRs 2+ intact and symmetric  CHEST/LUNG: Clear to percussion bilaterally; No rales, rhonchi, wheezing, or rubs  HEART: Regular rate and rhythm; No murmurs, rubs, or gallops  ABDOMEN: Soft, Nontender, Nondistended; Bowel sounds present  EXTREMITIES:  2+ Peripheral Pulses, No clubbing, cyanosis, or edema  LYMPH: No lymphadenopathy noted  SKIN: No rashes or lesions    LABS:                        12.3   1.18  )-----------( 199      ( 27 Jul 2018 09:03 )             35.9     07-26    140  |  104  |  7   ----------------------------<  130<H>  3.9   |  29  |  0.65    Ca    8.8      26 Jul 2018 10:39          CAPILLARY BLOOD GLUCOSE        CULTURES:    HEMOGLOBIN A1C:    CHOLESTEROL:        RADIOLOGY & ADDITIONAL TESTS:
Patient is a 77y old  Female who presents with a chief complaint of syncope (21 Jul 2018 19:27)      INTERVAL HPI/OVERNIGHT EVENTS:  pt is doing better, dtr at the bedside, no diarrhea  MEDICATIONS  (STANDING):  carvedilol 12.5 milliGRAM(s) Oral every 12 hours  dexamethasone     Tablet 4 milliGRAM(s) Oral two times a day  filgrastim-sndz Injectable 300 MICROGram(s) SubCutaneous daily  heparin  Injectable 5000 Unit(s) SubCutaneous every 12 hours  losartan 50 milliGRAM(s) Oral daily  pantoprazole    Tablet 40 milliGRAM(s) Oral before breakfast  simvastatin 20 milliGRAM(s) Oral at bedtime  sodium chloride 0.45%. 1000 milliLiter(s) (75 mL/Hr) IV Continuous <Continuous>    MEDICATIONS  (PRN):  acetaminophen   Tablet 650 milliGRAM(s) Oral every 6 hours PRN For Temp greater than 38 C (100.4 F)  acetaminophen   Tablet. 650 milliGRAM(s) Oral every 6 hours PRN Mild Pain (1 - 3)  loperamide 2 milliGRAM(s) Oral two times a day PRN Diarrhea  ondansetron    Tablet 4 milliGRAM(s) Oral every 8 hours PRN Nausea and/or Vomiting      Allergies    No Known Allergies    Intolerances        REVIEW OF SYSTEMS:  CONSTITUTIONAL: No fever, weight loss, or fatigue  EYES: No eye pain, visual disturbances, or discharge  ENMT:  No difficulty hearing, tinnitus, vertigo; No sinus or throat pain  NECK: No pain or stiffness  BREASTS: No pain, masses, or nipple discharge  RESPIRATORY: No cough, wheezing, chills or hemoptysis; No shortness of breath  CARDIOVASCULAR: No chest pain, palpitations, dizziness, or leg swelling  GASTROINTESTINAL: No abdominal or epigastric pain. No nausea, vomiting, or hematemesis; No diarrhea or constipation. No melena or hematochezia.  GENITOURINARY: No dysuria, frequency, hematuria, or incontinence  NEUROLOGICAL: No headaches, memory loss, loss of strength, numbness, or tremors  SKIN: No itching, burning, rashes, or lesions   LYMPH NODES: No enlarged glands  ENDOCRINE: No heat or cold intolerance; No hair loss  MUSCULOSKELETAL: No joint pain or swelling; No muscle, back, or extremity pain  PSYCHIATRIC: No depression, anxiety, mood swings, or difficulty sleeping  HEME/LYMPH: No easy bruising, or bleeding gums  ALLERGY AND IMMUNOLOGIC: No hives or eczema    Vital Signs Last 24 Hrs  T(C): 36.7 (26 Jul 2018 10:48), Max: 37.1 (26 Jul 2018 00:18)  T(F): 98 (26 Jul 2018 10:48), Max: 98.8 (26 Jul 2018 04:50)  HR: 93 (26 Jul 2018 10:48) (93 - 133)  BP: 132/84 (26 Jul 2018 10:48) (128/68 - 165/98)  BP(mean): --  RR: 18 (26 Jul 2018 10:48) (18 - 21)  SpO2: 98% (26 Jul 2018 10:48) (97% - 98%)    PHYSICAL EXAM:  GENERAL: NAD, well-groomed, well-developed  HEAD:  Atraumatic, Normocephalic  EYES: EOMI, PERRLA, conjunctiva and sclera clear  ENMT: No tonsillar erythema, exudates, or enlargement; Moist mucous membranes, Good dentition, No lesions  NECK: Supple, No JVD, Normal thyroid  NERVOUS SYSTEM:  Alert & Oriented X3, Good concentration; Motor Strength 5/5 B/L upper and lower extremities; DTRs 2+ intact and symmetric  CHEST/LUNG: Clear to percussion bilaterally; No rales, rhonchi, wheezing, or rubs  HEART: Regular rate and rhythm; No murmurs, rubs, or gallops  ABDOMEN: Soft, Nontender, Nondistended; Bowel sounds present  EXTREMITIES:  2+ Peripheral Pulses, No clubbing, cyanosis, or edema  LYMPH: No lymphadenopathy noted  SKIN: No rashes or lesions    LABS:                        12.8   0.74  )-----------( 174      ( 26 Jul 2018 10:39 )             38.8     07-26    140  |  104  |  7   ----------------------------<  130<H>  3.9   |  29  |  0.65    Ca    8.8      26 Jul 2018 10:39          CAPILLARY BLOOD GLUCOSE        CULTURES:  Culture Results:   No enteric pathogens isolated.  (Stool culture examined for Salmonella,  Shigella, Campylobacter, Aeromonas, Plesiomonas,  Vibrio, E.coli O157 and Yersinia) (07-24 @ 09:08)  Culture Results:   Culture grew 3 or more types of organisms which indicate  collection contamination; consider recollection only if clinically  indicated. (07-23 @ 18:00)    HEMOGLOBIN A1C:    CHOLESTEROL:        RADIOLOGY & ADDITIONAL TESTS:
Patient is a 77y old  Female who presents with a chief complaint of syncope (21 Jul 2018 19:27)      INTERVAL HPI/OVERNIGHT EVENTS:  pt is having diarrhea  MEDICATIONS  (STANDING):  heparin  Injectable 5000 Unit(s) SubCutaneous every 12 hours  sodium chloride 0.45%. 1000 milliLiter(s) (75 mL/Hr) IV Continuous <Continuous>    MEDICATIONS  (PRN):  acetaminophen   Tablet 650 milliGRAM(s) Oral every 6 hours PRN For Temp greater than 38 C (100.4 F)  acetaminophen   Tablet. 650 milliGRAM(s) Oral every 6 hours PRN Mild Pain (1 - 3)  loperamide 2 milliGRAM(s) Oral two times a day PRN Diarrhea      Allergies    No Known Allergies    Intolerances        REVIEW OF SYSTEMS:  CONSTITUTIONAL: No fever, weight loss, or fatigue  EYES: No eye pain, visual disturbances, or discharge  ENMT:  No difficulty hearing, tinnitus, vertigo; No sinus or throat pain  NECK: No pain or stiffness  BREASTS: No pain, masses, or nipple discharge  RESPIRATORY: No cough, wheezing, chills or hemoptysis; No shortness of breath  CARDIOVASCULAR: No chest pain, palpitations, dizziness, or leg swelling  GASTROINTESTINAL: No abdominal or epigastric pain. No nausea, vomiting, or hematemesis; No diarrhea or constipation. No melena or hematochezia.  GENITOURINARY: No dysuria, frequency, hematuria, or incontinence  NEUROLOGICAL: No headaches, memory loss, loss of strength, numbness, or tremors  SKIN: No itching, burning, rashes, or lesions   LYMPH NODES: No enlarged glands  ENDOCRINE: No heat or cold intolerance; No hair loss  MUSCULOSKELETAL: No joint pain or swelling; No muscle, back, or extremity pain  PSYCHIATRIC: No depression, anxiety, mood swings, or difficulty sleeping  HEME/LYMPH: No easy bruising, or bleeding gums  ALLERGY AND IMMUNOLOGIC: No hives or eczema    Vital Signs Last 24 Hrs  T(C): 37.4 (23 Jul 2018 09:31), Max: 38.3 (22 Jul 2018 12:22)  T(F): 99.3 (23 Jul 2018 09:31), Max: 100.9 (22 Jul 2018 12:22)  HR: 119 (23 Jul 2018 09:31) (81 - 119)  BP: 144/81 (23 Jul 2018 09:31) (140/80 - 156/96)  BP(mean): --  RR: 16 (23 Jul 2018 09:31) (16 - 18)  SpO2: 98% (23 Jul 2018 05:13) (96% - 99%)    PHYSICAL EXAM:  GENERAL: NAD, well-groomed, well-developed  HEAD:  Atraumatic, Normocephalic  EYES: EOMI, PERRLA, conjunctiva and sclera clear  ENMT: No tonsillar erythema, exudates, or enlargement; Moist mucous membranes, Good dentition, No lesions  NECK: Supple, No JVD, Normal thyroid  NERVOUS SYSTEM:  Alert & Oriented X3, Good concentration; Motor Strength 5/5 B/L upper and lower extremities; DTRs 2+ intact and symmetric  CHEST/LUNG: Clear to percussion bilaterally; No rales, rhonchi, wheezing, or rubs  HEART: Regular rate and rhythm; No murmurs, rubs, or gallops  ABDOMEN: Soft, Nontender, Nondistended; Bowel sounds present  EXTREMITIES:  2+ Peripheral Pulses, No clubbing, cyanosis, or edema  LYMPH: No lymphadenopathy noted  SKIN: No rashes or lesions    LABS:                        14.5   3.35  )-----------( 149      ( 23 Jul 2018 06:24 )             43.3     07-23    142  |  103  |  19  ----------------------------<  80  3.7   |  29  |  0.63    Ca    8.7      23 Jul 2018 06:24  Mg     2.0     07-22    TPro  6.8  /  Alb  3.1<L>  /  TBili  0.9  /  DBili  x   /  AST  39<H>  /  ALT  65  /  AlkPhos  50  07-21    PT/INR - ( 21 Jul 2018 12:17 )   PT: 12.1 sec;   INR: 1.11 ratio         PTT - ( 21 Jul 2018 12:17 )  PTT:22.3 sec    CAPILLARY BLOOD GLUCOSE        CULTURES:  Culture Results:   Culture grew 3 or more types of organisms which indicate  collection contamination; consider recollection only if clinically  indicated. (07-21 @ 16:38)    HEMOGLOBIN A1C:    CHOLESTEROL:        RADIOLOGY & ADDITIONAL TESTS:
s/p G-CSF, patient feeling beter    Vital Signs Last 24 Hrs  T(C): 36.7 (26 Jul 2018 10:48), Max: 37.6 (25 Jul 2018 12:34)  T(F): 98 (26 Jul 2018 10:48), Max: 99.6 (25 Jul 2018 12:34)  HR: 93 (26 Jul 2018 10:48) (93 - 133)  BP: 132/84 (26 Jul 2018 10:48) (128/68 - 165/98)  BP(mean): --  RR: 18 (26 Jul 2018 10:48) (17 - 21)  SpO2: 98% (26 Jul 2018 10:48) (97% - 98%)    PHYSICAL EXAM:    general - AAO x 3  HEENT - No Icterus  CVS - RRR  RS - AE B/L  Abd - soft, NT  Ext - Pulses +        LABS:                        12.8   0.74  )-----------( 174      ( 26 Jul 2018 10:39 )             38.8     07-26    140  |  104  |  7   ----------------------------<  130<H>  3.9   |  29  |  0.65    Ca    8.8      26 Jul 2018 10:39            Culture - Stool (collected 24 Jul 2018 09:08)  Source: .Stool Feces  Preliminary Report (25 Jul 2018 11:34):    No enteric pathogens to date: Final culture pending    Culture - Urine (collected 23 Jul 2018 18:00)  Source: .Urine Clean Catch (Midstream)  Final Report (24 Jul 2018 15:26):    Culture grew 3 or more types of organisms which indicate    collection contamination; consider recollection only if clinically    indicated.    Culture - Blood (collected 23 Jul 2018 08:33)  Source: .Blood 1 aer btl rcvd  Preliminary Report (24 Jul 2018 09:00):    No growth to date.    Culture - Blood (collected 22 Jul 2018 23:00)  Source: .Blood Blood-Venous aerobic only  Preliminary Report (24 Jul 2018 01:02):    No growth to date.    Culture - Urine (collected 21 Jul 2018 16:38)  Source: .Urine Clean Catch (Midstream)  Final Report (22 Jul 2018 23:15):    Culture grew 3 or more types of organisms which indicate    collection contamination; consider recollection only if clinically    indicated.        RADIOLOGY & ADDITIONAL STUDIES:
Patient is a 77y old  Female who presents with a chief complaint of syncope (21 Jul 2018 19:27)      INTERVAL HPI/OVERNIGHT EVENTS:  pt is doing better  MEDICATIONS  (STANDING):  carvedilol 12.5 milliGRAM(s) Oral every 12 hours  dexamethasone     Tablet 4 milliGRAM(s) Oral two times a day  filgrastim-sndz Injectable 300 MICROGram(s) SubCutaneous daily  heparin  Injectable 5000 Unit(s) SubCutaneous every 12 hours  losartan 100 milliGRAM(s) Oral daily  pantoprazole    Tablet 40 milliGRAM(s) Oral before breakfast  simvastatin 20 milliGRAM(s) Oral at bedtime    MEDICATIONS  (PRN):  acetaminophen   Tablet 650 milliGRAM(s) Oral every 6 hours PRN For Temp greater than 38 C (100.4 F)  acetaminophen   Tablet. 650 milliGRAM(s) Oral every 6 hours PRN Mild Pain (1 - 3)  artificial  tears Solution 1 Drop(s) Both EYES four times a day PRN Dry Eyes  loperamide 2 milliGRAM(s) Oral two times a day PRN Diarrhea  ondansetron    Tablet 4 milliGRAM(s) Oral every 8 hours PRN Nausea and/or Vomiting      Allergies    No Known Allergies    Intolerances        REVIEW OF SYSTEMS:  CONSTITUTIONAL: No fever, weight loss, or fatigue  EYES: No eye pain, visual disturbances, or discharge  ENMT:  No difficulty hearing, tinnitus, vertigo; No sinus or throat pain  NECK: No pain or stiffness  BREASTS: No pain, masses, or nipple discharge  RESPIRATORY: No cough, wheezing, chills or hemoptysis; No shortness of breath  CARDIOVASCULAR: No chest pain, palpitations, dizziness, or leg swelling  GASTROINTESTINAL: No abdominal or epigastric pain. No nausea, vomiting, or hematemesis; No diarrhea or constipation. No melena or hematochezia.  GENITOURINARY: No dysuria, frequency, hematuria, or incontinence  NEUROLOGICAL: No headaches, memory loss, loss of strength, numbness, or tremors  SKIN: No itching, burning, rashes, or lesions   LYMPH NODES: No enlarged glands  ENDOCRINE: No heat or cold intolerance; No hair loss  MUSCULOSKELETAL: No joint pain or swelling; No muscle, back, or extremity pain  PSYCHIATRIC: No depression, anxiety, mood swings, or difficulty sleeping  HEME/LYMPH: No easy bruising, or bleeding gums  ALLERGY AND IMMUNOLOGIC: No hives or eczema    Vital Signs Last 24 Hrs  T(C): 36.8 (28 Jul 2018 05:37), Max: 36.9 (28 Jul 2018 00:18)  T(F): 98.2 (28 Jul 2018 05:37), Max: 98.5 (28 Jul 2018 00:18)  HR: 89 (28 Jul 2018 05:37) (74 - 97)  BP: 137/81 (28 Jul 2018 05:37) (114/84 - 149/79)  BP(mean): --  RR: 18 (28 Jul 2018 05:37) (18 - 18)  SpO2: 96% (28 Jul 2018 05:37) (95% - 100%)    PHYSICAL EXAM:  GENERAL: NAD, well-groomed, well-developed  HEAD:  Atraumatic, Normocephalic  EYES: EOMI, PERRLA, conjunctiva and sclera clear  ENMT: No tonsillar erythema, exudates, or enlargement; Moist mucous membranes, Good dentition, No lesions  NECK: Supple, No JVD, Normal thyroid  NERVOUS SYSTEM:  Alert & Oriented X3, Good concentration; Motor Strength 5/5 B/L upper and lower extremities; DTRs 2+ intact and symmetric  CHEST/LUNG: Clear to percussion bilaterally; No rales, rhonchi, wheezing, or rubs  HEART: Regular rate and rhythm; No murmurs, rubs, or gallops  ABDOMEN: Soft, Nontender, Nondistended; Bowel sounds present  EXTREMITIES:  2+ Peripheral Pulses, No clubbing, cyanosis, or edema  LYMPH: No lymphadenopathy noted  SKIN: No rashes or lesions    LABS:                        11.0   6.06  )-----------( 197      ( 28 Jul 2018 08:08 )             33.6     07-28    140  |  106  |  12  ----------------------------<  87  3.6   |  27  |  0.66    Ca    7.7<L>      28 Jul 2018 08:08          CAPILLARY BLOOD GLUCOSE        CULTURES:    HEMOGLOBIN A1C:    CHOLESTEROL:        RADIOLOGY & ADDITIONAL TESTS:

## 2018-07-28 NOTE — DISCHARGE NOTE ADULT - HOSPITAL COURSE
76 y/o female hx htn, hld, breast cancer s/p surgery last month and 1st chemo 4 days ago, (distant history of ovarian cancer >20 years ago) present with syncopal episode while having blood taken at lab. pt noted to be neutropenic and had diarrhea 2/2 chemotherapy

## 2018-07-28 NOTE — DISCHARGE NOTE ADULT - MEDICATION SUMMARY - MEDICATIONS TO TAKE
I will START or STAY ON the medications listed below when I get home from the hospital:    dexamethasone 4 mg oral tablet  -- 1 tab(s) by mouth 2 times a day  -- Indication: For Breast cancer    cyclophosphamide 500 mg intravenous injection  -- Indication: For Breast cancer    ondansetron 4 mg oral tablet  -- 1 tab(s) by mouth every 8 hours, As Needed  -- Indication: For Breast cancer    simvastatin 20 mg oral tablet  -- 1 tab(s) by mouth once a day (at bedtime)  -- Indication: For HLD    hydrochlorothiazide-irbesartan  -- Indication: For Essential hypertension    carvedilol 12.5 mg oral tablet  -- 1 tab(s) by mouth 2 times a day  -- Indication: For Essential hypertension    famotidine  -- Indication: For gerd    omeprazole 20 mg oral delayed release capsule  -- 1 cap(s) by mouth once a day  -- Indication: For gerd

## 2018-07-28 NOTE — PROGRESS NOTE ADULT - PROBLEM SELECTOR PROBLEM 1
Other drug-induced neutropenia
Diarrhea, unspecified type
Diarrhea, unspecified type
Breast cancer
Diarrhea, unspecified type
Hypokalemia
Malignant neoplasm of male breast, unspecified estrogen receptor status, unspecified laterality, unspecified site of breast
Other drug-induced neutropenia
Other drug-induced neutropenia
Malignant neoplasm of male breast, unspecified estrogen receptor status, unspecified laterality, unspecified site of breast

## 2018-07-28 NOTE — PROGRESS NOTE ADULT - PROVIDER SPECIALTY LIST ADULT
Cardiology
Heme/Onc
Internal Medicine

## 2018-07-28 NOTE — DISCHARGE NOTE ADULT - CARE PLAN
Principal Discharge DX:	Other drug-induced neutropenia  Goal:	pt to resume home medication  Assessment and plan of treatment:	follow up with pmd and oncologist in 1 week  Secondary Diagnosis:	Hypokalemia  Secondary Diagnosis:	Diarrhea, unspecified type  Secondary Diagnosis:	Malignant neoplasm of male breast, unspecified estrogen receptor status, unspecified laterality, unspecified site of breast  Secondary Diagnosis:	Syncope, unspecified syncope type

## 2018-07-28 NOTE — DISCHARGE NOTE ADULT - SECONDARY DIAGNOSIS.
Hypokalemia Diarrhea, unspecified type Malignant neoplasm of male breast, unspecified estrogen receptor status, unspecified laterality, unspecified site of breast Syncope, unspecified syncope type

## 2018-08-02 DIAGNOSIS — C50.912 MALIGNANT NEOPLASM OF UNSPECIFIED SITE OF LEFT FEMALE BREAST: ICD-10-CM

## 2018-08-02 DIAGNOSIS — R55 SYNCOPE AND COLLAPSE: ICD-10-CM

## 2018-08-02 DIAGNOSIS — Z92.21 PERSONAL HISTORY OF ANTINEOPLASTIC CHEMOTHERAPY: ICD-10-CM

## 2018-08-02 DIAGNOSIS — D70.9 NEUTROPENIA, UNSPECIFIED: ICD-10-CM

## 2018-08-02 DIAGNOSIS — K52.1 TOXIC GASTROENTERITIS AND COLITIS: ICD-10-CM

## 2018-08-02 DIAGNOSIS — E87.6 HYPOKALEMIA: ICD-10-CM

## 2018-08-02 DIAGNOSIS — E78.5 HYPERLIPIDEMIA, UNSPECIFIED: ICD-10-CM

## 2018-08-02 DIAGNOSIS — T45.1X5A ADVERSE EFFECT OF ANTINEOPLASTIC AND IMMUNOSUPPRESSIVE DRUGS, INITIAL ENCOUNTER: ICD-10-CM

## 2018-08-02 DIAGNOSIS — E44.0 MODERATE PROTEIN-CALORIE MALNUTRITION: ICD-10-CM

## 2018-08-02 DIAGNOSIS — I10 ESSENTIAL (PRIMARY) HYPERTENSION: ICD-10-CM

## 2018-08-02 DIAGNOSIS — Z90.12 ACQUIRED ABSENCE OF LEFT BREAST AND NIPPLE: ICD-10-CM

## 2018-08-02 DIAGNOSIS — K21.9 GASTRO-ESOPHAGEAL REFLUX DISEASE WITHOUT ESOPHAGITIS: ICD-10-CM

## 2018-08-02 DIAGNOSIS — I51.7 CARDIOMEGALY: ICD-10-CM

## 2018-08-07 ENCOUNTER — APPOINTMENT (OUTPATIENT)
Dept: SURGERY | Facility: CLINIC | Age: 78
End: 2018-08-07
Payer: MEDICARE

## 2018-08-07 VITALS — HEART RATE: 79 BPM | DIASTOLIC BLOOD PRESSURE: 70 MMHG | SYSTOLIC BLOOD PRESSURE: 104 MMHG

## 2018-08-07 PROBLEM — N63.0 UNSPECIFIED LUMP IN UNSPECIFIED BREAST: Chronic | Status: ACTIVE | Noted: 2018-03-26

## 2018-08-07 PROBLEM — H26.9 UNSPECIFIED CATARACT: Chronic | Status: ACTIVE | Noted: 2018-03-26

## 2018-08-07 PROCEDURE — 99214 OFFICE O/P EST MOD 30 MIN: CPT

## 2018-08-27 NOTE — H&P PST ADULT - NEGATIVE FEMALE-SPECIFIC SYMPTOMS
Subjective   Ivy Chairez is a 61 y.o. female who presents today for:    Edema (NP, of hands, feet and abdomin)    Ms. Chairez presents today to establish care at this practice. Her previous PCP just retired and she needs a new PCP. Her main concern today is that she has been having edema of her hands, feet and abdomin over the past 6 months. She states she has gained at least 10 lbs over that time period. She has not changed her diet. She denies shortness of breath, or chest pains. She does have fatigue and she usually walks 3 miles a day but has not been doing so because she is tired and also because of the hot weather. She has a history of thyroid disease, CHF, stroke, hypertension and CAD.  Her other concern today is that she is having difficulty sleeping. She has been taking Sominex at night for sleep but it does not help. She states she is very restless at night.    I have reviewed the patient's medical history in detail and updated the computerized patient record.       Ms. Chairez  reports that she quit smoking about 9 years ago. She has never used smokeless tobacco. She reports that she does not drink alcohol or use drugs.     Allergies   Allergen Reactions   • Metronidazole Diarrhea and Nausea And Vomiting   • Amoxicillin Swelling   • Hydrocodone-Acetaminophen Nausea And Vomiting       Current Outpatient Prescriptions:   •  aspirin 81 MG tablet, Take 81 mg by mouth Daily., Disp: , Rfl:   •  Cholecalciferol (VITAMIN D3) 2000 UNITS tablet, Take 2,000 Units by mouth Daily., Disp: , Rfl:   •  Cyanocobalamin (VITAMIN B 12 PO), Take  by mouth Daily., Disp: , Rfl:   •  DiphenhydrAMINE HCl, Sleep, (SOMINEX PO), Take  by mouth., Disp: , Rfl:   •  levothyroxine (SYNTHROID, LEVOTHROID) 75 MCG tablet, TAKE 1 TABLET BY MOUTH EVERY DAY, Disp: 90 tablet, Rfl: 1  •  meclizine (ANTIVERT) 25 MG tablet, Take 1 tablet by mouth 3 (Three) Times a Day., Disp: 270 tablet, Rfl: 3  •  meloxicam (MOBIC) 15 MG tablet, 1 PO Daily  "with food., Disp: 30 tablet, Rfl: 0  •  metoprolol succinate XL (TOPROL-XL) 25 MG 24 hr tablet, Take 1 tablet by mouth Daily., Disp: 90 tablet, Rfl: 3  •  NITROGLYCERIN SL, Place  under the tongue As Needed. For chest pain, Disp: , Rfl:   •  rosuvastatin (CRESTOR) 40 MG tablet, Take 40 mg by mouth Daily., Disp: , Rfl:   •  venlafaxine (EFFEXOR) 75 MG tablet, Take 75 mg by mouth every night at bedtime., Disp: , Rfl:   •  amitriptyline (ELAVIL) 10 MG tablet, Take 1 tablet by mouth Every Night., Disp: 30 tablet, Rfl: 5      Review of Systems   Constitutional: Positive for activity change and fatigue.   HENT: Negative.    Eyes: Negative.    Respiratory: Negative.    Cardiovascular: Positive for leg swelling (hands, feet and abdomin).   Gastrointestinal: Negative.    Endocrine: Negative.    Musculoskeletal: Negative.    Skin: Negative.    Neurological: Negative.          Objective   Vitals:    08/27/18 1433   BP: 114/68   BP Location: Left arm   Patient Position: Sitting   Cuff Size: Adult   Pulse: 78   Temp: 98 °F (36.7 °C)   TempSrc: Oral   SpO2: 97%   Weight: 76.2 kg (167 lb 14.4 oz)   Height: 168.9 cm (66.5\")     Physical Exam   Constitutional: She is oriented to person, place, and time. She appears well-developed and well-nourished.   HENT:   Head: Normocephalic.   Right Ear: External ear normal.   Left Ear: External ear normal.   Nose: Nose normal.   Mouth/Throat: Oropharynx is clear and moist.   Neck: Normal range of motion. Neck supple.   Cardiovascular: Normal rate, regular rhythm, normal heart sounds and intact distal pulses.    Pulmonary/Chest: Effort normal and breath sounds normal.   Abdominal: Soft. Bowel sounds are normal.   Neurological: She is alert and oriented to person, place, and time.   Skin: Skin is warm and dry.   Psychiatric:   No acute distress   Vitals reviewed.            Ivy was seen today for edema.    Diagnoses and all orders for this visit:    Hyperlipidemia, unspecified " hyperlipidemia type  -     Lipid Panel With / Chol / HDL Ratio; Future    Essential hypertension  -     Comprehensive Metabolic Panel; Future    Acquired hypothyroidism  -     TSH; Future    Generalized edema  -     BNP; Future    Screening for deficiency anemia  -     Iron; Future  -     CBC (No Diff); Future    Insomnia, unspecified type  -     amitriptyline (ELAVIL) 10 MG tablet; Take 1 tablet by mouth Every Night.    1. I have reviewed her medical records that are available in the [facility] EMR system. She has not had labs in several months.   2. Because of her cardiac history and also her thyroid history I have ordered a CMP, BMP, CBC, iron level, TSH and I will do a lipid profile also.   3. I have ordered Amitriptyline 10 mg nightly for sleep.   4. Her blood pressure is 114/68 today.   5. She is to follow up in 6 months and as needed.    no vaginal discharge

## 2018-09-12 ENCOUNTER — APPOINTMENT (OUTPATIENT)
Dept: GYNECOLOGIC ONCOLOGY | Facility: CLINIC | Age: 78
End: 2018-09-12
Payer: MEDICARE

## 2018-09-12 VITALS
WEIGHT: 132 LBS | BODY MASS INDEX: 21.99 KG/M2 | HEIGHT: 65 IN | DIASTOLIC BLOOD PRESSURE: 72 MMHG | SYSTOLIC BLOOD PRESSURE: 110 MMHG

## 2018-09-12 PROCEDURE — 99214 OFFICE O/P EST MOD 30 MIN: CPT

## 2018-09-18 PROBLEM — Z00.00 ENCOUNTER FOR PREVENTIVE HEALTH EXAMINATION: Noted: 2018-09-18

## 2018-10-05 ENCOUNTER — APPOINTMENT (OUTPATIENT)
Dept: ULTRASOUND IMAGING | Facility: IMAGING CENTER | Age: 78
End: 2018-10-05
Payer: MEDICARE

## 2018-10-05 ENCOUNTER — OUTPATIENT (OUTPATIENT)
Dept: OUTPATIENT SERVICES | Facility: HOSPITAL | Age: 78
LOS: 1 days | End: 2018-10-05
Payer: COMMERCIAL

## 2018-10-05 ENCOUNTER — APPOINTMENT (OUTPATIENT)
Dept: MAMMOGRAPHY | Facility: IMAGING CENTER | Age: 78
End: 2018-10-05
Payer: MEDICARE

## 2018-10-05 DIAGNOSIS — Z00.8 ENCOUNTER FOR OTHER GENERAL EXAMINATION: ICD-10-CM

## 2018-10-05 DIAGNOSIS — Z90.710 ACQUIRED ABSENCE OF BOTH CERVIX AND UTERUS: Chronic | ICD-10-CM

## 2018-10-05 DIAGNOSIS — Z98.89 OTHER SPECIFIED POSTPROCEDURAL STATES: Chronic | ICD-10-CM

## 2018-10-05 DIAGNOSIS — Z98.890 OTHER SPECIFIED POSTPROCEDURAL STATES: Chronic | ICD-10-CM

## 2018-10-05 PROCEDURE — G0279: CPT | Mod: 26

## 2018-10-05 PROCEDURE — 77061 BREAST TOMOSYNTHESIS UNI: CPT | Mod: 26,RT

## 2018-10-05 PROCEDURE — 76641 ULTRASOUND BREAST COMPLETE: CPT | Mod: 26,RT

## 2018-10-05 PROCEDURE — 76641 ULTRASOUND BREAST COMPLETE: CPT

## 2018-10-05 PROCEDURE — 77065 DX MAMMO INCL CAD UNI: CPT | Mod: 26,RT

## 2018-10-05 PROCEDURE — G0279: CPT

## 2018-10-05 PROCEDURE — 77065 DX MAMMO INCL CAD UNI: CPT

## 2018-10-05 RX ORDER — CYCLOPHOSPHAMIDE 100 MG
996 VIAL (EA) INTRAVENOUS
Qty: 0 | Refills: 0 | COMMUNITY

## 2018-10-05 RX ORDER — DOCETAXEL 20 MG/ML
0 INJECTION, SOLUTION, CONCENTRATE INTRAVENOUS
Qty: 0 | Refills: 0 | COMMUNITY

## 2018-10-05 RX ORDER — DIPHENHYDRAMINE HCL 50 MG
0 CAPSULE ORAL
Qty: 0 | Refills: 0 | COMMUNITY

## 2018-10-05 RX ORDER — CARVEDILOL PHOSPHATE 80 MG/1
1 CAPSULE, EXTENDED RELEASE ORAL
Qty: 0 | Refills: 0 | COMMUNITY

## 2018-10-05 RX ORDER — GRANISETRON HYDROCHLORIDE 1 MG/1
0 TABLET, FILM COATED ORAL
Qty: 0 | Refills: 0 | COMMUNITY

## 2018-10-05 RX ORDER — DEXAMETHASONE 0.5 MG/5ML
0 ELIXIR ORAL
Qty: 0 | Refills: 0 | COMMUNITY

## 2018-10-05 RX ORDER — FAMOTIDINE 10 MG/ML
0 INJECTION INTRAVENOUS
Qty: 0 | Refills: 0 | COMMUNITY

## 2018-10-25 ENCOUNTER — APPOINTMENT (OUTPATIENT)
Dept: ULTRASOUND IMAGING | Facility: IMAGING CENTER | Age: 78
End: 2018-10-25

## 2018-10-25 ENCOUNTER — APPOINTMENT (OUTPATIENT)
Dept: MAMMOGRAPHY | Facility: IMAGING CENTER | Age: 78
End: 2018-10-25

## 2018-11-08 ENCOUNTER — APPOINTMENT (OUTPATIENT)
Dept: SURGERY | Facility: CLINIC | Age: 78
End: 2018-11-08
Payer: MEDICARE

## 2018-11-08 VITALS
WEIGHT: 132 LBS | SYSTOLIC BLOOD PRESSURE: 139 MMHG | DIASTOLIC BLOOD PRESSURE: 86 MMHG | BODY MASS INDEX: 21.99 KG/M2 | HEART RATE: 75 BPM | HEIGHT: 65 IN

## 2018-11-08 PROBLEM — C50.919 MALIGNANT NEOPLASM OF UNSPECIFIED SITE OF UNSPECIFIED FEMALE BREAST: Chronic | Status: ACTIVE | Noted: 2018-07-21

## 2018-11-08 PROCEDURE — 99214 OFFICE O/P EST MOD 30 MIN: CPT

## 2019-03-12 ENCOUNTER — APPOINTMENT (OUTPATIENT)
Dept: GYNECOLOGIC ONCOLOGY | Facility: CLINIC | Age: 79
End: 2019-03-12
Payer: MEDICARE

## 2019-03-12 VITALS
SYSTOLIC BLOOD PRESSURE: 125 MMHG | WEIGHT: 136 LBS | DIASTOLIC BLOOD PRESSURE: 80 MMHG | BODY MASS INDEX: 22.66 KG/M2 | HEIGHT: 65 IN

## 2019-03-12 PROCEDURE — 99213 OFFICE O/P EST LOW 20 MIN: CPT

## 2019-05-09 ENCOUNTER — APPOINTMENT (OUTPATIENT)
Dept: SURGERY | Facility: CLINIC | Age: 79
End: 2019-05-09
Payer: MEDICARE

## 2019-05-09 VITALS
HEIGHT: 65 IN | DIASTOLIC BLOOD PRESSURE: 70 MMHG | BODY MASS INDEX: 22.33 KG/M2 | TEMPERATURE: 98.3 F | HEART RATE: 80 BPM | SYSTOLIC BLOOD PRESSURE: 136 MMHG | WEIGHT: 134 LBS

## 2019-05-09 PROCEDURE — 99214 OFFICE O/P EST MOD 30 MIN: CPT

## 2019-05-09 NOTE — HISTORY OF PRESENT ILLNESS
[de-identified] : 78 y/o female with PMH of hypertension, hyperlipidemia, GERD, ovarian cancer in remission s/p hysterectomy and chemo in 1999 and chemotherapy in 2008, diagnosed with left breast cancer. Patient is s/p left breast total mastectomy with left breast axillary sentinel node biopsy 5/9/18. pathology results are consistent with Metastatic carcinoma involving 1 of 2 lymph nodes and invasive well-differentiated ductal carcinoma. Chemotherapy was discontinued due to the inability to tolerate the treatment.later she also had refused getting chemotherapy. \par H/o ovarian cancer in 1999 (borderline ovarian tumor with 2 foci of invasion). she received 6 courses of chemotherapy and was noted to have ovarian cyst in 2001. Last pelvic sonogram showed progressive ovarian cyst. Ca 15-3 also slightly increased. SEEN by Dr. Pinto. She underwent exploratory lap and tumor reduction surgery on 4/29/08. Histology showed again serous borderline tumor with a focal micropapillary pattern arising in a cyst with ovarian type stroma. With discussion with Dr. Adams, the border is not free of tumor.\par Completed her chemotherapy 11/08.  \[facility] \[facility] Gyn is followed by Dr. Parada, who has been following the patient.\par Had breast biopsy of the left breast in 8/5/2015 which showed sclerosing intraductal papilloma.\par Had some bloody discharge from the left breast & she is scheduled to have a US guided breast biopsy in [facility] Radiology\[facility] \[facility] No Gyn complaints.\par \par Repeat breast biopsy of left breast were performed on 11/16/2017 which showed dense collagenous scarring w hemosiderin deposition & foreign body reaction (benign). There is occasional drainage from the left breast area. 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 She was readmiited to [facility] for resectionj od=f residual axillary content which showed residual foci of microinvasive ductal carcinoma and 1/2 LN was positive for cancer.\par \par   \par \landen Completed her first cycle of chemotherapy on  August 18, 2108. He came here for blood work on August 21.  She passed out after phlebotomy and was sent to [facility] and she was noted to have diarrhea and neutropenia. She was given 4 shots of Neupogen and hydration. She was d/c home.\par \par On Arimidex for 7 months; no new complaints\par \par

## 2019-07-05 ENCOUNTER — INPATIENT (INPATIENT)
Facility: HOSPITAL | Age: 79
LOS: 1 days | Discharge: HOME | DRG: 641 | End: 2019-07-07
Attending: INTERNAL MEDICINE | Admitting: INTERNAL MEDICINE
Payer: COMMERCIAL

## 2019-07-05 VITALS
WEIGHT: 134.92 LBS | DIASTOLIC BLOOD PRESSURE: 78 MMHG | HEART RATE: 72 BPM | HEIGHT: 65 IN | RESPIRATION RATE: 16 BRPM | OXYGEN SATURATION: 98 % | SYSTOLIC BLOOD PRESSURE: 128 MMHG | TEMPERATURE: 98 F

## 2019-07-05 DIAGNOSIS — Z98.89 OTHER SPECIFIED POSTPROCEDURAL STATES: Chronic | ICD-10-CM

## 2019-07-05 DIAGNOSIS — R55 SYNCOPE AND COLLAPSE: ICD-10-CM

## 2019-07-05 DIAGNOSIS — Z98.890 OTHER SPECIFIED POSTPROCEDURAL STATES: Chronic | ICD-10-CM

## 2019-07-05 DIAGNOSIS — Z90.710 ACQUIRED ABSENCE OF BOTH CERVIX AND UTERUS: Chronic | ICD-10-CM

## 2019-07-05 LAB
ALBUMIN SERPL ELPH-MCNC: 3.8 G/DL — SIGNIFICANT CHANGE UP (ref 3.5–5)
ALP SERPL-CCNC: 66 U/L — SIGNIFICANT CHANGE UP (ref 40–120)
ALT FLD-CCNC: 30 U/L DA — SIGNIFICANT CHANGE UP (ref 10–60)
ANION GAP SERPL CALC-SCNC: 4 MMOL/L — LOW (ref 5–17)
APPEARANCE UR: CLEAR — SIGNIFICANT CHANGE UP
AST SERPL-CCNC: 43 U/L — HIGH (ref 10–40)
BACTERIA # UR AUTO: ABNORMAL /HPF
BASOPHILS # BLD AUTO: 0.04 K/UL — SIGNIFICANT CHANGE UP (ref 0–0.2)
BASOPHILS NFR BLD AUTO: 0.5 % — SIGNIFICANT CHANGE UP (ref 0–2)
BILIRUB SERPL-MCNC: 0.5 MG/DL — SIGNIFICANT CHANGE UP (ref 0.2–1.2)
BILIRUB UR-MCNC: NEGATIVE — SIGNIFICANT CHANGE UP
BUN SERPL-MCNC: 21 MG/DL — HIGH (ref 7–18)
CALCIUM SERPL-MCNC: 9.7 MG/DL — SIGNIFICANT CHANGE UP (ref 8.4–10.5)
CHLORIDE SERPL-SCNC: 104 MMOL/L — SIGNIFICANT CHANGE UP (ref 96–108)
CO2 SERPL-SCNC: 32 MMOL/L — HIGH (ref 22–31)
COLOR SPEC: YELLOW — SIGNIFICANT CHANGE UP
CREAT SERPL-MCNC: 1.1 MG/DL — SIGNIFICANT CHANGE UP (ref 0.5–1.3)
DIFF PNL FLD: NEGATIVE — SIGNIFICANT CHANGE UP
EOSINOPHIL # BLD AUTO: 0.06 K/UL — SIGNIFICANT CHANGE UP (ref 0–0.5)
EOSINOPHIL NFR BLD AUTO: 0.7 % — SIGNIFICANT CHANGE UP (ref 0–6)
EPI CELLS # UR: SIGNIFICANT CHANGE UP /HPF
GLUCOSE SERPL-MCNC: 88 MG/DL — SIGNIFICANT CHANGE UP (ref 70–99)
GLUCOSE UR QL: NEGATIVE — SIGNIFICANT CHANGE UP
HCT VFR BLD CALC: 42.9 % — SIGNIFICANT CHANGE UP (ref 34.5–45)
HGB BLD-MCNC: 14.2 G/DL — SIGNIFICANT CHANGE UP (ref 11.5–15.5)
IMM GRANULOCYTES NFR BLD AUTO: 0.2 % — SIGNIFICANT CHANGE UP (ref 0–1.5)
KETONES UR-MCNC: NEGATIVE — SIGNIFICANT CHANGE UP
LACTATE SERPL-SCNC: 1.9 MMOL/L — SIGNIFICANT CHANGE UP (ref 0.7–2)
LEUKOCYTE ESTERASE UR-ACNC: ABNORMAL
LYMPHOCYTES # BLD AUTO: 1.37 K/UL — SIGNIFICANT CHANGE UP (ref 1–3.3)
LYMPHOCYTES # BLD AUTO: 16.9 % — SIGNIFICANT CHANGE UP (ref 13–44)
MCHC RBC-ENTMCNC: 31.9 PG — SIGNIFICANT CHANGE UP (ref 27–34)
MCHC RBC-ENTMCNC: 33.1 GM/DL — SIGNIFICANT CHANGE UP (ref 32–36)
MCV RBC AUTO: 96.4 FL — SIGNIFICANT CHANGE UP (ref 80–100)
MONOCYTES # BLD AUTO: 0.82 K/UL — SIGNIFICANT CHANGE UP (ref 0–0.9)
MONOCYTES NFR BLD AUTO: 10.1 % — SIGNIFICANT CHANGE UP (ref 2–14)
NEUTROPHILS # BLD AUTO: 5.82 K/UL — SIGNIFICANT CHANGE UP (ref 1.8–7.4)
NEUTROPHILS NFR BLD AUTO: 71.6 % — SIGNIFICANT CHANGE UP (ref 43–77)
NITRITE UR-MCNC: NEGATIVE — SIGNIFICANT CHANGE UP
NRBC # BLD: 0 /100 WBCS — SIGNIFICANT CHANGE UP (ref 0–0)
PH UR: 6 — SIGNIFICANT CHANGE UP (ref 5–8)
PLATELET # BLD AUTO: SIGNIFICANT CHANGE UP K/UL (ref 150–400)
POTASSIUM SERPL-MCNC: 5.1 MMOL/L — SIGNIFICANT CHANGE UP (ref 3.5–5.3)
POTASSIUM SERPL-SCNC: 5.1 MMOL/L — SIGNIFICANT CHANGE UP (ref 3.5–5.3)
PROT SERPL-MCNC: 8.3 G/DL — SIGNIFICANT CHANGE UP (ref 6–8.3)
PROT UR-MCNC: 15
RBC # BLD: 4.45 M/UL — SIGNIFICANT CHANGE UP (ref 3.8–5.2)
RBC # FLD: 13.4 % — SIGNIFICANT CHANGE UP (ref 10.3–14.5)
RBC CASTS # UR COMP ASSIST: SIGNIFICANT CHANGE UP /HPF (ref 0–2)
SODIUM SERPL-SCNC: 140 MMOL/L — SIGNIFICANT CHANGE UP (ref 135–145)
SP GR SPEC: 1.01 — SIGNIFICANT CHANGE UP (ref 1.01–1.02)
TROPONIN I SERPL-MCNC: <0.015 NG/ML — SIGNIFICANT CHANGE UP (ref 0–0.04)
UROBILINOGEN FLD QL: NEGATIVE — SIGNIFICANT CHANGE UP
WBC # BLD: 8.13 K/UL — SIGNIFICANT CHANGE UP (ref 3.8–10.5)
WBC # FLD AUTO: 8.13 K/UL — SIGNIFICANT CHANGE UP (ref 3.8–10.5)
WBC UR QL: SIGNIFICANT CHANGE UP /HPF (ref 0–5)

## 2019-07-05 PROCEDURE — 71045 X-RAY EXAM CHEST 1 VIEW: CPT | Mod: 26

## 2019-07-05 PROCEDURE — 99222 1ST HOSP IP/OBS MODERATE 55: CPT

## 2019-07-05 PROCEDURE — 99285 EMERGENCY DEPT VISIT HI MDM: CPT

## 2019-07-05 RX ORDER — SODIUM CHLORIDE 9 MG/ML
1000 INJECTION INTRAMUSCULAR; INTRAVENOUS; SUBCUTANEOUS ONCE
Refills: 0 | Status: COMPLETED | OUTPATIENT
Start: 2019-07-05 | End: 2019-07-05

## 2019-07-05 RX ADMIN — SODIUM CHLORIDE 1000 MILLILITER(S): 9 INJECTION INTRAMUSCULAR; INTRAVENOUS; SUBCUTANEOUS at 19:00

## 2019-07-05 NOTE — H&P ADULT - NSICDXFAMILYHX_GEN_ALL_CORE_FT
FAMILY HISTORY:  Sibling  Still living? No  Family history of breast cancer in sister, Age at diagnosis: Age Unknown  Family history of pancreatic cancer, Age at diagnosis: Age Unknown

## 2019-07-05 NOTE — H&P ADULT - NSICDXPASTSURGICALHX_GEN_ALL_CORE_FT
PAST SURGICAL HISTORY:  H/O breast biopsy Left    S/P abdominal hysterectomy     S/P breast biopsy, right for discharge from breast    S/P  section times two

## 2019-07-05 NOTE — ED PROVIDER NOTE - PMH
Breast cancer    Breast discharge  left  Breast lump in female  left  Cataract  Bilateral  GERD (gastroesophageal reflux disease)    High cholesterol    History of hypertension    Ovarian ca  treated with chemo 1999 and 2008

## 2019-07-05 NOTE — H&P ADULT - ASSESSMENT
Patient is 78 year old female has medical histoy of Breast cancer s/p left mastectomy 2018, htn, hld brought in for syncope evaluation.

## 2019-07-05 NOTE — H&P ADULT - HISTORY OF PRESENT ILLNESS
Patient is 78 year old female has medical hsitroy of Breast cancer s/p left mastectomy 2018, thn, hld brought in for syncope evaluation.     Today pt went to senior citizen function, at 11 am, she was at function had lunch and around 3 pm suddenly she had blurred vision and after that she does not remeber what happened, she blacked out for 4-5 mintues, daugher was present at the site when it happended, pt did not passout but she blacked out, she was awake but was not responding, was disoriented, then she had an episode of vomiting, they tried to take her to restroom to clean her but pt could not move her legs, then they called EMS  by the time EMS came pt satrted to become cohirent, pt was lillte confused and [facility] had an episode of urinary incontinence.     Patient denies fever, nausea, vomiting, diarrhea, dizziness, light headedness, sob, chest pain, numbness, weakness before episode but she said lunch looked wirered to her and it might have caused the disorientation episode. patient denies abdominal pain, dysuria, trauma, or similar episode in the past. Patient is 78 year old female has medical histoy of Breast cancer s/p left mastectomy 2018, htn, hld brought in for syncope evaluation.     Today pt went to senior citizen function, at 11 am, she was at function had lunch and around 3 pm suddenly she had blurred vision and after that she does not remeber what happened, she blacked out for 4-5 mintues, daugher was present at the site when it happended, pt did not pass out but she blacked out, she was awake but was not responding, was disoriented, then she had an episode of vomiting, they tried to take her to restroom to clean her but pt could not move her legs, then they called EMS  by the time EMS came pt satrted to become coherent pt was lillte confused and [facility] had an episode of urinary incontinence.     Patient denies fever, nausea, vomiting, diarrhea, dizziness, light headedness, sob, chest pain, numbness, weakness before episode but she said lunch looked wirered to her and it might have caused the disorientation episode. patient denies abdominal pain, dysuria, trauma, or similar episode in the past.

## 2019-07-05 NOTE — H&P ADULT - PROBLEM SELECTOR PLAN 1
syncopal episode likely vasovagal from dehydration as creatinine is elevated from baseline  CXR looks clear, U/A negative, vitals stable  ekg sinus rhythm  t1 negative f/u t2 in am   c/w telemetry   pt had echo and cd doppler last year for syncopal workup  will give her hydration   f/u orthostatic blood pressure

## 2019-07-05 NOTE — H&P ADULT - ATTENDING COMMENTS
Vital Signs Last 24 Hrs  T(C): 36.7 (05 Jul 2019 15:50), Max: 36.7 (05 Jul 2019 15:50)  T(F): 98.1 (05 Jul 2019 15:50), Max: 98.1 (05 Jul 2019 15:50)  HR: 72 (05 Jul 2019 15:50) (72 - 72)  BP: 128/78 (05 Jul 2019 15:50) (128/78 - 128/78)  BP(mean): --  RR: 16 (05 Jul 2019 15:50) (16 - 16)  SpO2: 98% (05 Jul 2019 15:50) (98% - 98%) This is an 78 year old woman with PMH of HTN, HLD, breast cancer on hormonal therapy who presents with a syncopal episode today. She was at a [employer] event for seniors when she began to feel dizzy and blurry visioned while sitting down. There was no chest pain/SOB or palpitations. She passed out in the chair but did not fall off as people held her and helped carry her into her daughter's car. She did not return to baseline immediately but did not have any convulsions per witnesses, no eye-rolling, no lateral tongue biting but daughter suspected she had urine incontinence. She had an episode of vomiting in her daughter's car and EMS was called at that point. She was back to baseline by the time she arrived in the ED.    She has had episodes of syncope at least 2 times in the past both of which are in her old records the last about a year ago; both of which appear to be vasovagal or meds related with cardiac and neurological work up uneventful.    In the ED, she had basic work up which were unremarkable except for changes to both BUN and Cr.      Vital Signs Last 24 Hrs  T(C): 36.7 (05 Jul 2019 15:50), Max: 36.7 (05 Jul 2019 15:50)  T(F): 98.1 (05 Jul 2019 15:50), Max: 98.1 (05 Jul 2019 15:50)  HR: 72 (05 Jul 2019 15:50) (72 - 72)  BP: 128/78 (05 Jul 2019 15:50) (128/78 - 128/78)  RR: 16 (05 Jul 2019 15:50) (16 - 16)  SpO2: 98% (05 Jul 2019 15:50) (98% - 98%)    Elderly woman, awake, alert, oriented X 3, NAD  RRR with premature beats, S1S2 only  CTA B/L with some basal fine crepitations  Soft, full NTND BS +  No pedal edema  No focal deficits    Labs                        14.2   8.13  )-----------( Clumped    ( 05 Jul 2019 18:33 )             42.9     07-05    140  |  104  |  21<H>  ----------------------------<  88  5.1   |  32<H>  |  1.10    Ca    9.7      05 Jul 2019 18:33    TPro  8.3  /  Alb  3.8  /  TBili  0.5  /  DBili  x   /  AST  43<H>  /  ALT  30  /  AlkPhos  66  07-05    EKG - sinus rhythm with PVCs    Impression  78 year old woman with hx as above presenting with a syncopal episode which appears to be related to dehydration and vasovagal reflexes. Likelihood of a cardiac or neurological event is low.  Will admit to observation unit, obtain orthostatic vital signs, complete serial cardiac enzymes IV and PO hydration ; repeat chemistry    A/P  - Syncope most likely from vasovagal and dehydration/hypovolemia  Obvious elevation in serum BUN/Cr with pre-renal DARLENE  Low likelihood for seizure/cardiac pathology or CVA  Will admit to telemetry  Serial trop  IV hydration   repeat chemistry in AM  If no issues overnight; consider D/C in AM  Hx of pseudothrombocytopenia with clumped platelets; check peripheral smear This is an 78 year old woman with PMH of HTN, HLD, breast cancer on hormonal therapy who presents with a syncopal episode today. She was at a [employer] event for seniors when she began to feel dizzy and blurry visioned while sitting down. There was no chest pain/SOB or palpitations. She passed out in the chair but did not fall off as people held her and helped carry her into her daughter's car. She did not return to baseline immediately but did not have any convulsions per witnesses, no eye-rolling, no lateral tongue biting but daughter suspected she had urine incontinence. She had an episode of vomiting in her daughter's car and EMS was called at that point. She was back to baseline by the time she arrived in the ED.    She has had episodes of syncope at least 2 times in the past both of which are in her old records the last about a year ago; both of which appear to be vasovagal or meds related with cardiac and neurological work up uneventful.    In the ED, she had basic work up which were unremarkable except for changes to both BUN and Cr.      Vital Signs Last 24 Hrs  T(C): 36.7 (05 Jul 2019 15:50), Max: 36.7 (05 Jul 2019 15:50)  T(F): 98.1 (05 Jul 2019 15:50), Max: 98.1 (05 Jul 2019 15:50)  HR: 72 (05 Jul 2019 15:50) (72 - 72)  BP: 128/78 (05 Jul 2019 15:50) (128/78 - 128/78)  RR: 16 (05 Jul 2019 15:50) (16 - 16)  SpO2: 98% (05 Jul 2019 15:50) (98% - 98%)    Elderly woman, awake, alert, oriented X 3, NAD  RRR with premature beats, S1S2 only  CTA B/L with some basal fine crepitations  Soft, full NTND BS +  No pedal edema  No focal deficits    Labs                        14.2   8.13  )-----------( Clumped    ( 05 Jul 2019 18:33 )             42.9     07-05    140  |  104  |  21<H>  ----------------------------<  88  5.1   |  32<H>  |  1.10    Ca    9.7      05 Jul 2019 18:33    TPro  8.3  /  Alb  3.8  /  TBili  0.5  /  DBili  x   /  AST  43<H>  /  ALT  30  /  AlkPhos  66  07-05    EKG - sinus rhythm with PVCs    Impression  78 year old woman with hx as above presenting with a syncopal episode which appears to be related to dehydration and vasovagal reflexes.   Likelihood of a cardiac or neurological event is low. Other suspicious signs for a seizure like urinary retention or time to return to baseline not specific especially since she remained in the sitting position all through the event until EMS placed her in a stretcher at which time she quickly returned to her baseline.  Will admit to observation unit, obtain orthostatic vital signs, complete serial cardiac enzymes IV and PO hydration ; repeat chemistry    A/P  - Syncope most likely from vasovagal and dehydration/hypovolemia  Obvious elevation in serum BUN/Cr with pre-renal DARLENE compared to baseline  Low likelihood for seizure/cardiac pathology or CVA  Will admit to telemetry  Serial trop  IV hydration   repeat chemistry in AM  If no issues overnight; consider D/C in AM  Hx of pseudothrombocytopenia with clumped platelets; check peripheral smear

## 2019-07-05 NOTE — H&P ADULT - NSICDXPASTMEDICALHX_GEN_ALL_CORE_FT
PAST MEDICAL HISTORY:  Breast cancer     Breast discharge left    Breast lump in female left    Cataract Bilateral    GERD (gastroesophageal reflux disease)     High cholesterol     History of hypertension     Ovarian ca treated with chemo 1999 and 2008

## 2019-07-05 NOTE — ED PROVIDER NOTE - OBJECTIVE STATEMENT
77 y/o F with a significant PMHx of breast CA, GERD, HLD, HTN and ovarian CA presents to the ED with complaints of syncopal episode. Patient was at senior gathering held outdoors for 3 hours and collapsed. Daughter reports patient being confused upon waking up and vomiting once, prompting visit to the ED. Denies chest pain, shortness of breath, nausea, diaphoresis or any other symptoms prior to collapse.

## 2019-07-06 ENCOUNTER — TRANSCRIPTION ENCOUNTER (OUTPATIENT)
Age: 79
End: 2019-07-06

## 2019-07-06 DIAGNOSIS — C50.919 MALIGNANT NEOPLASM OF UNSPECIFIED SITE OF UNSPECIFIED FEMALE BREAST: ICD-10-CM

## 2019-07-06 DIAGNOSIS — Z29.9 ENCOUNTER FOR PROPHYLACTIC MEASURES, UNSPECIFIED: ICD-10-CM

## 2019-07-06 DIAGNOSIS — E78.00 PURE HYPERCHOLESTEROLEMIA, UNSPECIFIED: ICD-10-CM

## 2019-07-06 DIAGNOSIS — Z86.79 PERSONAL HISTORY OF OTHER DISEASES OF THE CIRCULATORY SYSTEM: ICD-10-CM

## 2019-07-06 DIAGNOSIS — R94.31 ABNORMAL ELECTROCARDIOGRAM [ECG] [EKG]: ICD-10-CM

## 2019-07-06 DIAGNOSIS — R55 SYNCOPE AND COLLAPSE: ICD-10-CM

## 2019-07-06 LAB
24R-OH-CALCIDIOL SERPL-MCNC: 68.4 NG/ML — SIGNIFICANT CHANGE UP (ref 30–80)
ALBUMIN SERPL ELPH-MCNC: 3.4 G/DL — LOW (ref 3.5–5)
ALP SERPL-CCNC: 60 U/L — SIGNIFICANT CHANGE UP (ref 40–120)
ALT FLD-CCNC: 22 U/L DA — SIGNIFICANT CHANGE UP (ref 10–60)
ANION GAP SERPL CALC-SCNC: 6 MMOL/L — SIGNIFICANT CHANGE UP (ref 5–17)
AST SERPL-CCNC: 18 U/L — SIGNIFICANT CHANGE UP (ref 10–40)
BILIRUB SERPL-MCNC: 0.7 MG/DL — SIGNIFICANT CHANGE UP (ref 0.2–1.2)
BUN SERPL-MCNC: 14 MG/DL — SIGNIFICANT CHANGE UP (ref 7–18)
CALCIUM SERPL-MCNC: 8.9 MG/DL — SIGNIFICANT CHANGE UP (ref 8.4–10.5)
CHLORIDE SERPL-SCNC: 107 MMOL/L — SIGNIFICANT CHANGE UP (ref 96–108)
CHOLEST SERPL-MCNC: 145 MG/DL — SIGNIFICANT CHANGE UP (ref 10–199)
CO2 SERPL-SCNC: 31 MMOL/L — SIGNIFICANT CHANGE UP (ref 22–31)
CREAT SERPL-MCNC: 0.79 MG/DL — SIGNIFICANT CHANGE UP (ref 0.5–1.3)
D DIMER BLD IA.RAPID-MCNC: 277 NG/ML DDU — HIGH
FOLATE SERPL-MCNC: >20 NG/ML — SIGNIFICANT CHANGE UP
GLUCOSE SERPL-MCNC: 73 MG/DL — SIGNIFICANT CHANGE UP (ref 70–99)
HBA1C BLD-MCNC: 5.6 % — SIGNIFICANT CHANGE UP (ref 4–5.6)
HCT VFR BLD CALC: 39.7 % — SIGNIFICANT CHANGE UP (ref 34.5–45)
HDLC SERPL-MCNC: 47 MG/DL — LOW
HGB BLD-MCNC: 13.2 G/DL — SIGNIFICANT CHANGE UP (ref 11.5–15.5)
LACTATE SERPL-SCNC: 1.2 MMOL/L — SIGNIFICANT CHANGE UP (ref 0.7–2)
LIPID PNL WITH DIRECT LDL SERPL: 83 MG/DL — SIGNIFICANT CHANGE UP
MAGNESIUM SERPL-MCNC: 1.6 MG/DL — SIGNIFICANT CHANGE UP (ref 1.6–2.6)
MCHC RBC-ENTMCNC: 31.6 PG — SIGNIFICANT CHANGE UP (ref 27–34)
MCHC RBC-ENTMCNC: 33.2 GM/DL — SIGNIFICANT CHANGE UP (ref 32–36)
MCV RBC AUTO: 95 FL — SIGNIFICANT CHANGE UP (ref 80–100)
NRBC # BLD: 0 /100 WBCS — SIGNIFICANT CHANGE UP (ref 0–0)
PHOSPHATE SERPL-MCNC: 2.8 MG/DL — SIGNIFICANT CHANGE UP (ref 2.5–4.5)
PLATELET # BLD AUTO: 186 K/UL — SIGNIFICANT CHANGE UP (ref 150–400)
POTASSIUM SERPL-MCNC: 3.4 MMOL/L — LOW (ref 3.5–5.3)
POTASSIUM SERPL-SCNC: 3.4 MMOL/L — LOW (ref 3.5–5.3)
PROT SERPL-MCNC: 7 G/DL — SIGNIFICANT CHANGE UP (ref 6–8.3)
RBC # BLD: 4.18 M/UL — SIGNIFICANT CHANGE UP (ref 3.8–5.2)
RBC # FLD: 13.4 % — SIGNIFICANT CHANGE UP (ref 10.3–14.5)
SODIUM SERPL-SCNC: 144 MMOL/L — SIGNIFICANT CHANGE UP (ref 135–145)
TOTAL CHOLESTEROL/HDL RATIO MEASUREMENT: 3.1 RATIO — LOW (ref 3.3–7.1)
TRIGL SERPL-MCNC: 74 MG/DL — SIGNIFICANT CHANGE UP (ref 10–149)
TROPONIN I SERPL-MCNC: <0.015 NG/ML — SIGNIFICANT CHANGE UP (ref 0–0.04)
TROPONIN I SERPL-MCNC: <0.015 NG/ML — SIGNIFICANT CHANGE UP (ref 0–0.04)
TSH SERPL-MCNC: 1.02 UU/ML — SIGNIFICANT CHANGE UP (ref 0.34–4.82)
VIT B12 SERPL-MCNC: 737 PG/ML — SIGNIFICANT CHANGE UP (ref 232–1245)
WBC # BLD: 6.13 K/UL — SIGNIFICANT CHANGE UP (ref 3.8–10.5)
WBC # FLD AUTO: 6.13 K/UL — SIGNIFICANT CHANGE UP (ref 3.8–10.5)

## 2019-07-06 PROCEDURE — 99233 SBSQ HOSP IP/OBS HIGH 50: CPT | Mod: GC

## 2019-07-06 PROCEDURE — 70450 CT HEAD/BRAIN W/O DYE: CPT | Mod: 26

## 2019-07-06 RX ORDER — OMEPRAZOLE 10 MG/1
1 CAPSULE, DELAYED RELEASE ORAL
Qty: 0 | Refills: 0 | DISCHARGE

## 2019-07-06 RX ORDER — HEPARIN SODIUM 5000 [USP'U]/ML
5000 INJECTION INTRAVENOUS; SUBCUTANEOUS EVERY 8 HOURS
Refills: 0 | Status: DISCONTINUED | OUTPATIENT
Start: 2019-07-06 | End: 2019-07-07

## 2019-07-06 RX ORDER — POTASSIUM CHLORIDE 20 MEQ
40 PACKET (EA) ORAL ONCE
Refills: 0 | Status: COMPLETED | OUTPATIENT
Start: 2019-07-06 | End: 2019-07-06

## 2019-07-06 RX ORDER — IRBESARTAN AND HYDROCHLOROTHIAZIDE 12.5; 3 MG/1; MG/1
0 TABLET ORAL
Qty: 0 | Refills: 0 | DISCHARGE

## 2019-07-06 RX ORDER — CYCLOPHOSPHAMIDE 100 MG
0 VIAL (EA) INTRAVENOUS
Qty: 0 | Refills: 0 | DISCHARGE

## 2019-07-06 RX ORDER — DEXAMETHASONE 0.5 MG/5ML
1 ELIXIR ORAL
Qty: 0 | Refills: 0 | DISCHARGE

## 2019-07-06 RX ORDER — ASPIRIN/CALCIUM CARB/MAGNESIUM 324 MG
81 TABLET ORAL DAILY
Refills: 0 | Status: DISCONTINUED | OUTPATIENT
Start: 2019-07-06 | End: 2019-07-07

## 2019-07-06 RX ORDER — CARVEDILOL PHOSPHATE 80 MG/1
12.5 CAPSULE, EXTENDED RELEASE ORAL EVERY 12 HOURS
Refills: 0 | Status: DISCONTINUED | OUTPATIENT
Start: 2019-07-06 | End: 2019-07-07

## 2019-07-06 RX ORDER — LOSARTAN POTASSIUM 100 MG/1
50 TABLET, FILM COATED ORAL DAILY
Refills: 0 | Status: DISCONTINUED | OUTPATIENT
Start: 2019-07-06 | End: 2019-07-07

## 2019-07-06 RX ORDER — ANASTROZOLE 1 MG/1
1 TABLET ORAL DAILY
Refills: 0 | Status: DISCONTINUED | OUTPATIENT
Start: 2019-07-06 | End: 2019-07-07

## 2019-07-06 RX ORDER — ONDANSETRON 8 MG/1
1 TABLET, FILM COATED ORAL
Qty: 0 | Refills: 0 | DISCHARGE

## 2019-07-06 RX ORDER — SIMVASTATIN 20 MG/1
20 TABLET, FILM COATED ORAL AT BEDTIME
Refills: 0 | Status: DISCONTINUED | OUTPATIENT
Start: 2019-07-06 | End: 2019-07-07

## 2019-07-06 RX ORDER — SODIUM CHLORIDE 9 MG/ML
1000 INJECTION INTRAMUSCULAR; INTRAVENOUS; SUBCUTANEOUS
Refills: 0 | Status: DISCONTINUED | OUTPATIENT
Start: 2019-07-06 | End: 2019-07-07

## 2019-07-06 RX ORDER — FAMOTIDINE 10 MG/ML
0 INJECTION INTRAVENOUS
Qty: 0 | Refills: 0 | DISCHARGE

## 2019-07-06 RX ORDER — ANASTROZOLE 1 MG/1
1 TABLET ORAL
Qty: 0 | Refills: 0 | DISCHARGE

## 2019-07-06 RX ORDER — SIMVASTATIN 20 MG/1
1 TABLET, FILM COATED ORAL
Qty: 0 | Refills: 0 | DISCHARGE

## 2019-07-06 RX ADMIN — CARVEDILOL PHOSPHATE 12.5 MILLIGRAM(S): 80 CAPSULE, EXTENDED RELEASE ORAL at 06:00

## 2019-07-06 RX ADMIN — HEPARIN SODIUM 5000 UNIT(S): 5000 INJECTION INTRAVENOUS; SUBCUTANEOUS at 14:25

## 2019-07-06 RX ADMIN — HEPARIN SODIUM 5000 UNIT(S): 5000 INJECTION INTRAVENOUS; SUBCUTANEOUS at 05:59

## 2019-07-06 RX ADMIN — Medication 40 MILLIEQUIVALENT(S): at 12:18

## 2019-07-06 RX ADMIN — SODIUM CHLORIDE 75 MILLILITER(S): 9 INJECTION INTRAMUSCULAR; INTRAVENOUS; SUBCUTANEOUS at 22:06

## 2019-07-06 RX ADMIN — SIMVASTATIN 20 MILLIGRAM(S): 20 TABLET, FILM COATED ORAL at 22:01

## 2019-07-06 RX ADMIN — HEPARIN SODIUM 5000 UNIT(S): 5000 INJECTION INTRAVENOUS; SUBCUTANEOUS at 22:01

## 2019-07-06 RX ADMIN — SODIUM CHLORIDE 75 MILLILITER(S): 9 INJECTION INTRAMUSCULAR; INTRAVENOUS; SUBCUTANEOUS at 05:59

## 2019-07-06 RX ADMIN — CARVEDILOL PHOSPHATE 12.5 MILLIGRAM(S): 80 CAPSULE, EXTENDED RELEASE ORAL at 17:13

## 2019-07-06 RX ADMIN — ANASTROZOLE 1 MILLIGRAM(S): 1 TABLET ORAL at 12:13

## 2019-07-06 RX ADMIN — LOSARTAN POTASSIUM 50 MILLIGRAM(S): 100 TABLET, FILM COATED ORAL at 06:00

## 2019-07-06 RX ADMIN — Medication 81 MILLIGRAM(S): at 12:18

## 2019-07-06 NOTE — DISCHARGE NOTE NURSING/CASE MANAGEMENT/SOCIAL WORK - NSDCDPATPORTLINK_GEN_ALL_CORE
You can access the [facility] Patient Portal, offered by [facility], by registering with the following website: [email or web address]

## 2019-07-06 NOTE — PROGRESS NOTE ADULT - PROBLEM SELECTOR PLAN 5
c/w home medication c/w blood home blood pressure medication  would defer the hctz portion of her medication in light of recurrent hypokalemia

## 2019-07-06 NOTE — PROGRESS NOTE ADULT - PROBLEM SELECTOR PLAN 1
syncopal episode likely vasovagal from dehydration as creatinine is elevated from baseline  CXR looks clear, U/A negative, vitals stable  ekg sinus rhythm  t1 negative f/u t2 in am   c/w telemetry   pt had echo and cd doppler last year for syncopal workup  will give her hydration   f/u orthostatic blood pressure syncopal episode likely vasovagal from dehydration as creatinine is elevated from baseline, checking D dimer  CXR looks clear, U/A negative, vitals stable  ekg sinus rhythm  t1 negative f/u t2 in am   c/w telemetry   s/p carotid doppler last year  can check TTE but do not have to keep inhouse for results  will give her hydration   f/u orthostatic blood pressure

## 2019-07-06 NOTE — PROGRESS NOTE ADULT - SUBJECTIVE AND OBJECTIVE BOX
Patient is a 78y old  Female who presents with a chief complaint of AMS (2019 22:42)      INTERVAL HPI/OVERNIGHT EVENTS: no new complaints    MEDICATIONS  (STANDING):  anastrozole 1 milliGRAM(s) Oral daily  aspirin enteric coated 81 milliGRAM(s) Oral daily  carvedilol 12.5 milliGRAM(s) Oral every 12 hours  heparin  Injectable 5000 Unit(s) SubCutaneous every 8 hours  losartan 50 milliGRAM(s) Oral daily  simvastatin 20 milliGRAM(s) Oral at bedtime  sodium chloride 0.9%. 1000 milliLiter(s) (75 mL/Hr) IV Continuous <Continuous>    MEDICATIONS  (PRN):      __________________________________________________  REVIEW OF SYSTEMS:    CONSTITUTIONAL: No fever,   EYES: no acute visual disturbances  NECK: No pain or stiffness  RESPIRATORY: No cough; No shortness of breath  CARDIOVASCULAR: No chest pain, no palpitations  GASTROINTESTINAL: No pain. No nausea or vomiting; No diarrhea   NEUROLOGICAL: No headache or numbness, no tremors  MUSCULOSKELETAL: No joint pain, no muscle pain  GENITOURINARY: no dysuria, no frequency, no hesitancy  PSYCHIATRY: no depression , no anxiety  ALL OTHER  ROS negative        Vital Signs Last 24 Hrs  T(C): 36.3 (2019 15:26), Max: 37.6 (2019 00:12)  T(F): 97.4 (2019 15:26), Max: 99.6 (2019 00:12)  HR: 64 (2019 15:26) (60 - 71)  BP: 142/64 (2019 15:26) (142/64 - 172/93)  BP(mean): --  RR: 17 (2019 15:26) (17 - 18)  SpO2: 100% (2019 15:26) (94% - 100%)    ________________________________________________  PHYSICAL EXAM:  GENERAL: NAD  HEENT: Normocephalic;  conjunctivae and sclerae clear; moist mucous membranes;   NECK : supple  CHEST/LUNG: Clear to auscultation bilaterally with good air entry   HEART: S1 S2  regular; no murmurs, gallops or rubs  ABDOMEN: Soft, Nontender, Nondistended; Bowel sounds present  EXTREMITIES: no cyanosis; no edema; no calf tenderness  SKIN: warm and dry; no rash  NERVOUS SYSTEM:  Awake and alert; Oriented  to place, person and time ; no new deficits    _________________________________________________  LABS:                        13.2   6.13  )-----------( 186      ( 2019 06:25 )             39.7     07-    144  |  107  |  14  ----------------------------<  73  3.4<L>   |  31  |  0.79    Ca    8.9      2019 06:25  Phos  2.8     07-06  Mg     1.6     07-06    TPro  7.0  /  Alb  3.4<L>  /  TBili  0.7  /  DBili  x   /  AST  18  /  ALT  22  /  AlkPhos  60  07-06      Urinalysis Basic - ( 2019 18:38 )    Color: Yellow / Appearance: Clear / S.010 / pH: x  Gluc: x / Ketone: Negative  / Bili: Negative / Urobili: Negative   Blood: x / Protein: 15 / Nitrite: Negative   Leuk Esterase: Trace / RBC: 0-2 /HPF / WBC 3-5 /HPF   Sq Epi: x / Non Sq Epi: Few /HPF / Bacteria: Trace /HPF      CAPILLARY BLOOD GLUCOSE            RADIOLOGY & ADDITIONAL TESTS:    Imaging Personally Reviewed:  YES/NO    Consultant(s) Notes Reviewed:   YES/ No    Care Discussed with Consultants :     Plan of care was discussed with patient and /or primary care giver; all questions and concerns were addressed and care was aligned with patient's wishes. Patient is a 78y old  Female who presents with a chief complaint of AMS (2019 22:42)      INTERVAL HPI/OVERNIGHT EVENTS: denies recurrence of lightheadedness. Pt reports she was outside x 3 hours when she experienced visual dimming/ tunnel type vision prior to syncopal episode    MEDICATIONS  (STANDING):  anastrozole 1 milliGRAM(s) Oral daily  aspirin enteric coated 81 milliGRAM(s) Oral daily  carvedilol 12.5 milliGRAM(s) Oral every 12 hours  heparin  Injectable 5000 Unit(s) SubCutaneous every 8 hours  losartan 50 milliGRAM(s) Oral daily  simvastatin 20 milliGRAM(s) Oral at bedtime  sodium chloride 0.9%. 1000 milliLiter(s) (75 mL/Hr) IV Continuous <Continuous>    MEDICATIONS  (PRN):      __________________________________________________  REVIEW OF SYSTEMS:    CONSTITUTIONAL: No fever,   EYES: no acute visual disturbances  NECK: No pain or stiffness  RESPIRATORY: No cough; No shortness of breath  CARDIOVASCULAR: No chest pain, no palpitations  GASTROINTESTINAL: No pain. No nausea or vomiting; No diarrhea   NEUROLOGICAL: No headache or numbness, no tremors  MUSCULOSKELETAL: No joint pain, no muscle pain  GENITOURINARY: no dysuria, no frequency, no hesitancy  PSYCHIATRY: no depression , no anxiety  ALL OTHER  ROS negative        Vital Signs Last 24 Hrs  T(C): 36.3 (2019 15:26), Max: 37.6 (2019 00:12)  T(F): 97.4 (2019 15:26), Max: 99.6 (2019 00:12)  HR: 64 (2019 15:26) (60 - 71)  BP: 142/64 (2019 15:26) (142/64 - 172/93)  BP(mean): --  RR: 17 (2019 15:26) (17 - 18)  SpO2: 100% (2019 15:26) (94% - 100%)    ________________________________________________  PHYSICAL EXAM:  GENERAL: NAD  HEENT: Normocephalic;  conjunctivae and sclerae clear; moist mucous membranes;   NECK : supple  CHEST/LUNG: Clear to auscultation bilaterally with good air entry   HEART: S1 S2  regular; no murmurs, gallops or rubs  ABDOMEN: Soft, Nontender, Nondistended; Bowel sounds present  EXTREMITIES: no cyanosis; no edema; no calf tenderness  SKIN: warm and dry; no rash  NERVOUS SYSTEM:  Awake and alert; Oriented  to place, person and time ; no new deficits    _________________________________________________  LABS:                        13.2   6.13  )-----------( 186      ( 2019 06:25 )             39.7     07-06    144  |  107  |  14  ----------------------------<  73  3.4<L>   |  31  |  0.79    Ca    8.9      2019 06:25  Phos  2.8     07-06  Mg     1.6     07-06    TPro  7.0  /  Alb  3.4<L>  /  TBili  0.7  /  DBili  x   /  AST  18  /  ALT  22  /  AlkPhos  60  07-06      Urinalysis Basic - ( 2019 18:38 )    Color: Yellow / Appearance: Clear / S.010 / pH: x  Gluc: x / Ketone: Negative  / Bili: Negative / Urobili: Negative   Blood: x / Protein: 15 / Nitrite: Negative   Leuk Esterase: Trace / RBC: 0-2 /HPF / WBC 3-5 /HPF   Sq Epi: x / Non Sq Epi: Few /HPF / Bacteria: Trace /HPF      CAPILLARY BLOOD GLUCOSE            RADIOLOGY & ADDITIONAL TESTS:    Imaging Personally Reviewed:  YES/NO    Consultant(s) Notes Reviewed:   YES/ No    Care Discussed with Consultants :     Plan of care was discussed with patient and /or primary care giver; all questions and concerns were addressed and care was aligned with patient's wishes.

## 2019-07-06 NOTE — PROGRESS NOTE ADULT - PROBLEM SELECTOR PLAN 2
repeat EKG after rechecking her BMP level tomorrow  ensure magnesium and phos are also within normal limits

## 2019-07-07 ENCOUNTER — TRANSCRIPTION ENCOUNTER (OUTPATIENT)
Age: 79
End: 2019-07-07

## 2019-07-07 VITALS
OXYGEN SATURATION: 98 % | DIASTOLIC BLOOD PRESSURE: 68 MMHG | RESPIRATION RATE: 17 BRPM | HEART RATE: 65 BPM | SYSTOLIC BLOOD PRESSURE: 166 MMHG | TEMPERATURE: 99 F

## 2019-07-07 DIAGNOSIS — E87.6 HYPOKALEMIA: ICD-10-CM

## 2019-07-07 LAB
ANION GAP SERPL CALC-SCNC: 5 MMOL/L — SIGNIFICANT CHANGE UP (ref 5–17)
BUN SERPL-MCNC: 14 MG/DL — SIGNIFICANT CHANGE UP (ref 7–18)
CALCIUM SERPL-MCNC: 9.1 MG/DL — SIGNIFICANT CHANGE UP (ref 8.4–10.5)
CHLORIDE SERPL-SCNC: 106 MMOL/L — SIGNIFICANT CHANGE UP (ref 96–108)
CO2 SERPL-SCNC: 32 MMOL/L — HIGH (ref 22–31)
CREAT SERPL-MCNC: 0.91 MG/DL — SIGNIFICANT CHANGE UP (ref 0.5–1.3)
GLUCOSE SERPL-MCNC: 104 MG/DL — HIGH (ref 70–99)
POTASSIUM SERPL-MCNC: 3.5 MMOL/L — SIGNIFICANT CHANGE UP (ref 3.5–5.3)
POTASSIUM SERPL-SCNC: 3.5 MMOL/L — SIGNIFICANT CHANGE UP (ref 3.5–5.3)
SODIUM SERPL-SCNC: 143 MMOL/L — SIGNIFICANT CHANGE UP (ref 135–145)

## 2019-07-07 PROCEDURE — 70450 CT HEAD/BRAIN W/O DYE: CPT

## 2019-07-07 PROCEDURE — 85730 THROMBOPLASTIN TIME PARTIAL: CPT

## 2019-07-07 PROCEDURE — 82306 VITAMIN D 25 HYDROXY: CPT

## 2019-07-07 PROCEDURE — 82607 VITAMIN B-12: CPT

## 2019-07-07 PROCEDURE — 99239 HOSP IP/OBS DSCHRG MGMT >30: CPT | Mod: GC

## 2019-07-07 PROCEDURE — 82962 GLUCOSE BLOOD TEST: CPT

## 2019-07-07 PROCEDURE — 71045 X-RAY EXAM CHEST 1 VIEW: CPT

## 2019-07-07 PROCEDURE — 93306 TTE W/DOPPLER COMPLETE: CPT

## 2019-07-07 PROCEDURE — 80053 COMPREHEN METABOLIC PANEL: CPT

## 2019-07-07 PROCEDURE — 83036 HEMOGLOBIN GLYCOSYLATED A1C: CPT

## 2019-07-07 PROCEDURE — 84100 ASSAY OF PHOSPHORUS: CPT

## 2019-07-07 PROCEDURE — 84484 ASSAY OF TROPONIN QUANT: CPT

## 2019-07-07 PROCEDURE — 85610 PROTHROMBIN TIME: CPT

## 2019-07-07 PROCEDURE — 36415 COLL VENOUS BLD VENIPUNCTURE: CPT

## 2019-07-07 PROCEDURE — 80061 LIPID PANEL: CPT

## 2019-07-07 PROCEDURE — 84443 ASSAY THYROID STIM HORMONE: CPT

## 2019-07-07 PROCEDURE — 93005 ELECTROCARDIOGRAM TRACING: CPT

## 2019-07-07 PROCEDURE — 83605 ASSAY OF LACTIC ACID: CPT

## 2019-07-07 PROCEDURE — 82746 ASSAY OF FOLIC ACID SERUM: CPT

## 2019-07-07 PROCEDURE — 85027 COMPLETE CBC AUTOMATED: CPT

## 2019-07-07 PROCEDURE — G0378: CPT

## 2019-07-07 PROCEDURE — 85379 FIBRIN DEGRADATION QUANT: CPT

## 2019-07-07 PROCEDURE — 81001 URINALYSIS AUTO W/SCOPE: CPT

## 2019-07-07 PROCEDURE — 83735 ASSAY OF MAGNESIUM: CPT

## 2019-07-07 PROCEDURE — 99285 EMERGENCY DEPT VISIT HI MDM: CPT | Mod: 25

## 2019-07-07 PROCEDURE — 80048 BASIC METABOLIC PNL TOTAL CA: CPT

## 2019-07-07 RX ORDER — LOSARTAN POTASSIUM 100 MG/1
1 TABLET, FILM COATED ORAL
Qty: 30 | Refills: 0
Start: 2019-07-07 | End: 2019-08-05

## 2019-07-07 RX ORDER — LOSARTAN POTASSIUM 100 MG/1
1 TABLET, FILM COATED ORAL
Qty: 0 | Refills: 0 | DISCHARGE
Start: 2019-07-07

## 2019-07-07 RX ADMIN — Medication 81 MILLIGRAM(S): at 11:36

## 2019-07-07 RX ADMIN — ANASTROZOLE 1 MILLIGRAM(S): 1 TABLET ORAL at 11:36

## 2019-07-07 RX ADMIN — HEPARIN SODIUM 5000 UNIT(S): 5000 INJECTION INTRAVENOUS; SUBCUTANEOUS at 13:59

## 2019-07-07 RX ADMIN — CARVEDILOL PHOSPHATE 12.5 MILLIGRAM(S): 80 CAPSULE, EXTENDED RELEASE ORAL at 05:56

## 2019-07-07 RX ADMIN — SODIUM CHLORIDE 50 MILLILITER(S): 9 INJECTION INTRAMUSCULAR; INTRAVENOUS; SUBCUTANEOUS at 08:52

## 2019-07-07 RX ADMIN — CARVEDILOL PHOSPHATE 12.5 MILLIGRAM(S): 80 CAPSULE, EXTENDED RELEASE ORAL at 17:41

## 2019-07-07 RX ADMIN — LOSARTAN POTASSIUM 50 MILLIGRAM(S): 100 TABLET, FILM COATED ORAL at 05:56

## 2019-07-07 RX ADMIN — HEPARIN SODIUM 5000 UNIT(S): 5000 INJECTION INTRAVENOUS; SUBCUTANEOUS at 05:56

## 2019-07-07 NOTE — DISCHARGE NOTE PROVIDER - NSDCCPCAREPLAN_GEN_ALL_CORE_FT
PRINCIPAL DISCHARGE DIAGNOSIS  Diagnosis: Syncope, unspecified syncope type  Assessment and Plan of Treatment: You presented with blurred vision and urinary leakage.Your Syncope was likely due to dehydration. You are recommended to drink water adequately. Your EKG was done , no new abnormality was found. You are found to have Prolong QT interval .You are recommended to follow up with your Cardiologist and PCP in out patient setting.      SECONDARY DISCHARGE DIAGNOSES  Diagnosis: History of hypertension  Assessment and Plan of Treatment: For Blood Pressure Control , Please continue current medication regimen, and follow up with your PCP  - You have a history of Hypertension.   - Your Blood Pressure was adequately controlled with carvedilol 12.5mg and Losartan 50 mg.  - You should continue on the current antihypertensive regimen regularly.  - You blood pressure should be within 140-120/80-90.  - You should follow-up with your PCP within 1 week of your discharge for routine blood pressure monitoring at your next visit.  - Notify your doctor if you have any of the following symptoms:   (Dizziness, Lightheadedness, Blurry vision, Headache, Chest pain, Shortness of breath.)  - You should maintain healthy lifestyle by eating healthy low salt diet, avoid fatty food, weight loss, exercise regularly as tolerated 30 mins X 3 time per week.      Diagnosis: Hypokalemia  Assessment and Plan of Treatment:     Diagnosis: High cholesterol  Assessment and Plan of Treatment: You have a history og high Cholesterol, your lipid profile shows your serum Cholesterol levels are controlled. You are recommended to continue your home medication and Follow up with PCP.    Diagnosis: Prolonged QT interval  Assessment and Plan of Treatment: You Presented with blurred vision, your EKG was done , it showed prolong QT interval. You are recommended to follow up with cardiologist in outpatient. PRINCIPAL DISCHARGE DIAGNOSIS  Diagnosis: Syncope, unspecified syncope type  Assessment and Plan of Treatment: You presented with blurred vision and urinary leakage.Your Syncope was likely due to dehydration. You are recommended to drink water adequately. Your EKG was done , no new abnormality was found. You are found to have Prolong QT interval .You are recommended to follow up with your Cardiologist and PCP in out patient setting.      SECONDARY DISCHARGE DIAGNOSES  Diagnosis: High cholesterol  Assessment and Plan of Treatment: You have a history og high Cholesterol, your lipid profile shows your serum Cholesterol levels are controlled. You are recommended to continue your home medication and Follow up with PCP.    Diagnosis: History of hypertension  Assessment and Plan of Treatment: For Blood Pressure Control , Please continue current medication regimen, and follow up with your PCP  - You have a history of Hypertension.   - Your Blood Pressure was adequately controlled with carvedilol 12.5mg and Losartan 50 mg.  - You should continue on the current antihypertensive regimen regularly.  - You blood pressure should be within 140-120/80-90.  - You should follow-up with your PCP within 1 week of your discharge for routine blood pressure monitoring at your next visit.  - Notify your doctor if you have any of the following symptoms:   (Dizziness, Lightheadedness, Blurry vision, Headache, Chest pain, Shortness of breath.)  - You should maintain healthy lifestyle by eating healthy low salt diet, avoid fatty food, weight loss, exercise regularly as tolerated 30 mins X 3 time per week.      Diagnosis: Prolonged QT interval  Assessment and Plan of Treatment: You Presented with blurred vision, your EKG was done , it showed prolong QT interval. You are recommended to follow up with cardiologist in outpatient.

## 2019-07-07 NOTE — DISCHARGE NOTE PROVIDER - HOSPITAL COURSE
Patient is 78 year old female has medical history of Breast cancer s/p left mastectomy 2018, htn, hld brought in for syncope evaluation.          [facility], at 11 am, she was at function had lunch and around 3 pm suddenly she had blurred vision and after that she does not remember what happened, she blacked out for 4-5 mintues, tajer was present at the site when it happened pt did not pass out but she blacked out, she was awake but was not responding, was disoriented, then she had an episode of vomiting, they tried to take her to restroom to clean her but pt could not move her legs, then they called EMS  by the time EMS came pt started to become coherent patient was little confused and when pt brought the hospital daughter noticed pt had an episode of urinary incontinence.         Her EKG showed sinus rhythm with prolonged QT interval, Chest X ray was negative, cardiac enzymes were in normal range, She was given oral hydration because her syncope was likely secondary to dehydration.         Her EKG was repeated , no acute pathology was found, Magnesium and Phosphorus were in normal limits. Her Echo results are pending , but she can follow it in outpatient setting.        For her Blood Pressure she is recommended to take Losartan 50mg daily and Carvedilol 12.5 mg b.i.d.        For Hyperlipidemia she is recommended to continue her home medications.        Patient is medically stable for discharge case discussed with Attending. Patient is 78 year old female has medical history of Breast cancer s/p left mastectomy 2018, htn, hld brought in for syncope evaluation.          [facility], at 11 am, she was at function had lunch and around 3 pm suddenly she had blurred vision and after that she does not remember what happened, she blacked out for 4-5 mintues, tajer was present at the site when it happened pt did not pass out but she blacked out, she was awake but was not responding, was disoriented, then she had an episode of vomiting, they tried to take her to restroom to clean her but pt could not move her legs, then they called EMS  by the time EMS came pt started to become coherent patient was little confused and when pt brought the hospital daughter noticed pt had an episode of urinary incontinence.         Her EKG showed sinus rhythm with prolonged QT interval, Chest X ray was negative, cardiac enzymes were in normal range, She was given oral hydration because her syncope was likely secondary to dehydration.         Her EKG was repeated , no acute pathology was found, Magnesium and Phosphorus were in normal limits. Her Echo results are pending , but she can follow it in outpatient setting.        For her Blood Pressure she is recommended to take Losartan 50mg daily and Carvedilol 12.5 mg b.i.d. Stopping HCTZ for hypokalemia.        For Hyperlipidemia she is recommended to continue her home medications.        Patient is medically stable for discharge case discussed with Attending.

## 2019-08-05 ENCOUNTER — APPOINTMENT (OUTPATIENT)
Dept: GYNECOLOGIC ONCOLOGY | Facility: CLINIC | Age: 79
End: 2019-08-05
Payer: MEDICARE

## 2019-08-05 VITALS
WEIGHT: 140 LBS | DIASTOLIC BLOOD PRESSURE: 99 MMHG | HEIGHT: 65 IN | SYSTOLIC BLOOD PRESSURE: 185 MMHG | BODY MASS INDEX: 23.32 KG/M2

## 2019-08-05 PROCEDURE — 99213 OFFICE O/P EST LOW 20 MIN: CPT

## 2019-11-12 ENCOUNTER — APPOINTMENT (OUTPATIENT)
Dept: SURGERY | Facility: CLINIC | Age: 79
End: 2019-11-12

## 2019-11-14 ENCOUNTER — APPOINTMENT (OUTPATIENT)
Dept: SURGERY | Facility: CLINIC | Age: 79
End: 2019-11-14
Payer: MEDICARE

## 2019-11-14 VITALS
TEMPERATURE: 98.1 F | WEIGHT: 137 LBS | BODY MASS INDEX: 22.82 KG/M2 | HEIGHT: 65 IN | SYSTOLIC BLOOD PRESSURE: 175 MMHG | HEART RATE: 81 BPM | DIASTOLIC BLOOD PRESSURE: 75 MMHG

## 2019-11-14 PROCEDURE — 99213 OFFICE O/P EST LOW 20 MIN: CPT

## 2019-11-14 NOTE — ASSESSMENT
[FreeTextEntry1] : 78 y/o female with PMH of hypertension, hyperlipidemia, GERD, ovarian cancer in remission s/p hysterectomy and chemo in 1999 and chemotherapy in 2008, diagnosed with left breast cancer. Patient is s/p left breast total mastectomy with left breast axillary sentinel node biopsy 5/9/18. pathology results are consistent with Metastatic carcinoma involving 1 of 2 lymph nodes and invasive well-differentiated ductal carcinoma. Chemotherapy was discontinued due to the inability to tolerate the treatment.later she also had refused getting chemotherapy. \par Patient without reported complaints. She had a breast US and mammo of R. breast done, 10/05/19; BI RADS cat 2 finding.

## 2019-11-14 NOTE — HISTORY OF PRESENT ILLNESS
[de-identified] : 76 y/o female with PMH of hypertension, hyperlipidemia, GERD, ovarian cancer in remission s/p hysterectomy and chemo in 1999 and chemotherapy in 2008, diagnosed with left breast cancer. Patient is s/p left breast total mastectomy with left breast axillary sentinel node biopsy 5/9/18. pathology results are consistent with Metastatic carcinoma involving 1 of 2 lymph nodes and invasive well-differentiated ductal carcinoma. Chemotherapy was discontinued due to the inability to tolerate the treatment.later she also had refused getting chemotherapy. \par Patient without reported complaints. She had a breast US and mammo of R. breast done, last month.

## 2019-11-14 NOTE — DATA REVIEWED
[FreeTextEntry1] : EXAM: US BREAST COMPLETE RT \par PROCEDURE DATE: 10/05/2018 \par INTERPRETATION: MAMMOGRAPHY: \par CLINICAL INDICATION: Screening mammography was performed; the patient \par presents with no new complaints. \par RISK FACTORS: History of left breast cancer status post mastectomy and \par chemotherapy 6/2018 \par LAST CLINICAL BREAST EXAM: 8/2018. \par COMPARISON STUDIES: 10/3/2017, 5/18/2015, 10/11/2013, 9/28/2012 \par \par Right breast mammography was performed including CC and MLO views. \par Additional imaging analysis was performed using CAD (computer-aided \par detection) software. Digital breast tomosynthesis was performed and used in \par the interpretation of images. \par \par The breasts are heterogeneously dense, which may obscure small masses. \par There is a right breast biopsy clip. \par There are right breast scattered benign calcifications. \par No dominant mass, suspicious grouping of calcifications, or other sign of \par malignancy is identified. \par IMPRESSION: \par No mammographic evidence of malignancy. \par RIGHT BREAST ULTRASOUND COMPLETE: \par CLINICAL INDICATION: Dense breast, history of cancer breast cancer \par COMPARISON STUDIES: None \par RIGHT BREAST: \par Sonographic evaluation of the right breast in its entirety was performed, \par including each of the four quadrants and the retroareolar region, in \par clockwise fashion. \par The breast parenchyma demonstrates a heterogeneous background echotexture \par (mixed fatty and fibroglandular). \par \par No suspicious solid lesion is identified. No abnormal acoustical shadowing \par is apparent. \par IMPRESSION: \par No sonographic evidence of malignancy. \par RECOMMENDATION: Mammography in one year \par BI-RADS 2-Benign finding(s) \par The patient will be notified of these results by telephone, and will also be \par mailed a written summary in layman's terms. \par \par \par \par \par EXAM: MG MAMMO DIAG W ELVIRA RT# \par PROCEDURE DATE: 10/05/2018 \par INTERPRETATION: MAMMOGRAPHY: \par CLINICAL INDICATION: Screening mammography was performed; the patient \par presents with no new complaints. \par \par RISK FACTORS: History of left breast cancer status post mastectomy and \par chemotherapy 6/2018 \par \par LAST CLINICAL BREAST EXAM: 8/2018. \par COMPARISON STUDIES: 10/3/2017, 5/18/2015, 10/11/2013, 9/28/2012 \par Right breast mammography was performed including CC and MLO views. \par Additional imaging analysis was performed using CAD (computer-aided \par detection) software. Digital breast tomosynthesis was performed and used in \par the interpretation of images. \par \par The breasts are heterogeneously dense, which may obscure small masses. \par \par There is a right breast biopsy clip. \par \par There are right breast scattered benign calcifications. \par \par No dominant mass, suspicious grouping of calcifications, or other sign of \par malignancy is identified. \par \par IMPRESSION: \par No mammographic evidence of malignancy. \par \par RIGHT BREAST ULTRASOUND COMPLETE: \par \par CLINICAL INDICATION: Dense breast, history of cancer breast cancer \par \par COMPARISON STUDIES: None \par \par RIGHT BREAST: \par Sonographic evaluation of the right breast in its entirety was performed, \par including each of the four quadrants and the retroareolar region, in \par clockwise fashion. \par \par The breast parenchyma demonstrates a heterogeneous background echotexture \par (mixed fatty and fibroglandular). \par \par No suspicious solid lesion is identified. No abnormal acoustical shadowing \par is apparent. \par \par \par \par IMPRESSION: \par No sonographic evidence of malignancy. \par \par RECOMMENDATION: Mammography in one year \par BI-RADS 2-Benign finding(s) \par The patient will be notified of these results by telephone, and will also be \par mailed a written summary in layman's terms. \par Dense breasts \par \par \par \par \par \par \par \par EDUIN FRYE M.D., ATTENDING RADIOLOGIST \par This document has been electronically signed. Oct 5 2018 4:18PM \par \par

## 2019-11-14 NOTE — PLAN
[FreeTextEntry1] : continue follow up with oncology \par return to the office for f/u visit, 6 months

## 2019-11-14 NOTE — CONSULT LETTER
[Dear  ___] : Dear  [unfilled], [Consult Letter:] : I had the pleasure of evaluating your patient, [unfilled]. [Consult Closing:] : Thank you very much for allowing me to participate in the care of this patient.  If you have any questions, please do not hesitate to contact me. [Sincerely,] : Sincerely, [FreeTextEntry3] : German Cole MD\par

## 2019-11-14 NOTE — REASON FOR VISIT
[Follow-Up: _____] : a [unfilled] follow-up visit [Family Member] : family member [FreeTextEntry1] : s/p left breast total mastectomy with left breast axillary sentinel node biopsy 5/9/18

## 2019-11-14 NOTE — PHYSICAL EXAM
[Normal Breath Sounds] : Normal breath sounds [Normal Rate and Rhythm] : normal rate and rhythm [No Rash or Lesion] : No rash or lesion [Alert] : alert [Oriented to Person] : oriented to person [Oriented to Place] : oriented to place [Oriented to Time] : oriented to time [Calm] : calm [de-identified] : A/Ox3; NAD. appears comfortable [de-identified] : EOMI, Anicteric sclera [de-identified] : supple, no JVD [de-identified] : well healed incision site to the L. side ; no recurrence noted. no new masses or lumps felt to R. breast, no axillary adenopathy. no tenderness  [de-identified] : abd is soft, NT/ND\par  [de-identified] : +ROM, no joint swelling

## 2020-02-03 ENCOUNTER — APPOINTMENT (OUTPATIENT)
Dept: GYNECOLOGIC ONCOLOGY | Facility: CLINIC | Age: 80
End: 2020-02-03
Payer: MEDICARE

## 2020-02-03 VITALS
HEIGHT: 65 IN | WEIGHT: 140 LBS | BODY MASS INDEX: 23.32 KG/M2 | HEART RATE: 73 BPM | DIASTOLIC BLOOD PRESSURE: 83 MMHG | SYSTOLIC BLOOD PRESSURE: 163 MMHG

## 2020-02-03 PROCEDURE — 99213 OFFICE O/P EST LOW 20 MIN: CPT

## 2020-05-14 ENCOUNTER — APPOINTMENT (OUTPATIENT)
Dept: SURGERY | Facility: CLINIC | Age: 80
End: 2020-05-14
Payer: MEDICARE

## 2020-05-14 PROCEDURE — 99213 OFFICE O/P EST LOW 20 MIN: CPT | Mod: 95

## 2020-05-14 NOTE — HISTORY OF PRESENT ILLNESS
[Medical Office: ([facility])___] : at the medical office located in  [Patient] : the patient [Home] : at home, [unfilled] , at the time of the visit. [de-identified] : 76 y/o female with PMH of hypertension, hyperlipidemia, GERD, ovarian cancer in remission s/p hysterectomy and chemo in 1999 and chemotherapy in 2008, diagnosed with left breast cancer. Patient is s/p left breast total mastectomy with left breast axillary sentinel node biopsy 5/9/18. pathology results are consistent with Metastatic carcinoma involving 1 of 2 lymph nodes and invasive well-differentiated ductal carcinoma. Chemotherapy was discontinued due to the inability to tolerate the treatment.later she also had refused getting chemotherapy. \par Patient without reported complaints. She had a breast US and mammo of R. breast done end of last year\par  \par

## 2020-05-14 NOTE — CONSULT LETTER
[Dear  ___] : Dear  [unfilled], [Consult Closing:] : Thank you very much for allowing me to participate in the care of this patient.  If you have any questions, please do not hesitate to contact me. [Sincerely,] : Sincerely, [FreeTextEntry3] : German Cole MD\par

## 2020-08-10 ENCOUNTER — APPOINTMENT (OUTPATIENT)
Dept: GYNECOLOGIC ONCOLOGY | Facility: CLINIC | Age: 80
End: 2020-08-10
Payer: MEDICARE

## 2020-08-10 VITALS
BODY MASS INDEX: 23.16 KG/M2 | HEART RATE: 81 BPM | DIASTOLIC BLOOD PRESSURE: 84 MMHG | HEIGHT: 65 IN | SYSTOLIC BLOOD PRESSURE: 153 MMHG | WEIGHT: 139 LBS

## 2020-08-10 PROCEDURE — 99213 OFFICE O/P EST LOW 20 MIN: CPT

## 2020-10-13 ENCOUNTER — RESULT REVIEW (OUTPATIENT)
Age: 80
End: 2020-10-13

## 2020-10-13 ENCOUNTER — APPOINTMENT (OUTPATIENT)
Dept: MAMMOGRAPHY | Facility: IMAGING CENTER | Age: 80
End: 2020-10-13
Payer: MEDICARE

## 2020-10-13 ENCOUNTER — OUTPATIENT (OUTPATIENT)
Dept: OUTPATIENT SERVICES | Facility: HOSPITAL | Age: 80
LOS: 1 days | End: 2020-10-13
Payer: COMMERCIAL

## 2020-10-13 ENCOUNTER — APPOINTMENT (OUTPATIENT)
Dept: ULTRASOUND IMAGING | Facility: IMAGING CENTER | Age: 80
End: 2020-10-13
Payer: MEDICARE

## 2020-10-13 DIAGNOSIS — Z98.890 OTHER SPECIFIED POSTPROCEDURAL STATES: Chronic | ICD-10-CM

## 2020-10-13 DIAGNOSIS — Z00.8 ENCOUNTER FOR OTHER GENERAL EXAMINATION: ICD-10-CM

## 2020-10-13 DIAGNOSIS — Z98.89 OTHER SPECIFIED POSTPROCEDURAL STATES: Chronic | ICD-10-CM

## 2020-10-13 DIAGNOSIS — Z90.710 ACQUIRED ABSENCE OF BOTH CERVIX AND UTERUS: Chronic | ICD-10-CM

## 2020-10-13 PROCEDURE — 76641 ULTRASOUND BREAST COMPLETE: CPT | Mod: 26,RT

## 2020-10-13 PROCEDURE — G0279: CPT

## 2020-10-13 PROCEDURE — 77065 DX MAMMO INCL CAD UNI: CPT | Mod: 26,RT

## 2020-10-13 PROCEDURE — 76641 ULTRASOUND BREAST COMPLETE: CPT

## 2020-10-13 PROCEDURE — 77065 DX MAMMO INCL CAD UNI: CPT

## 2020-10-13 PROCEDURE — G0279: CPT | Mod: 26

## 2020-11-02 ENCOUNTER — APPOINTMENT (OUTPATIENT)
Dept: SURGERY | Facility: CLINIC | Age: 80
End: 2020-11-02
Payer: MEDICARE

## 2020-11-05 ENCOUNTER — APPOINTMENT (OUTPATIENT)
Dept: SURGERY | Facility: CLINIC | Age: 80
End: 2020-11-05
Payer: MEDICARE

## 2020-11-05 VITALS
DIASTOLIC BLOOD PRESSURE: 94 MMHG | HEIGHT: 65 IN | WEIGHT: 142 LBS | SYSTOLIC BLOOD PRESSURE: 158 MMHG | BODY MASS INDEX: 23.66 KG/M2 | HEART RATE: 101 BPM

## 2020-11-05 PROCEDURE — 99213 OFFICE O/P EST LOW 20 MIN: CPT

## 2020-11-05 PROCEDURE — 99072 ADDL SUPL MATRL&STAF TM PHE: CPT

## 2020-11-05 NOTE — ASSESSMENT
[FreeTextEntry1] : 78 y/o female with PMH of hypertension, hyperlipidemia, GERD, ovarian cancer in remission s/p hysterectomy and chemo in 1999 and chemotherapy in 2008, diagnosed with left breast cancer. Patient is s/p left breast total mastectomy with left breast axillary sentinel node biopsy 5/9/18. Most recent imaging reviewed. Patient without reported complaints. Clinically, negative breast exam.

## 2020-11-05 NOTE — HISTORY OF PRESENT ILLNESS
[de-identified] : 76 y/o female with PMH of hypertension, hyperlipidemia, GERD, ovarian cancer in remission s/p hysterectomy and chemo in 1999 and chemotherapy in 2008, diagnosed with left breast cancer. Patient is s/p left breast total mastectomy with left breast axillary sentinel node biopsy 5/9/18. pathology results are consistent with Metastatic carcinoma involving 1 of 2 lymph nodes and invasive well-differentiated ductal carcinoma. Chemotherapy was discontinued due to the inability to tolerate the treatment.later she also had refused getting chemotherapy. \par Patient's recent breast mammo was done 10/13/20; results c/w no evidence of malignancy, BI RADS cat 1 with one year follow up recommended. Breast US, 10/13/20, BI RADS 2, no evidence of malignancy. \par Presently, patient is without reported complaints. She follows up with her oncologist, Dr. Forman.  [de-identified] : Patient reports no interval changes to her overall health status or medical history.\par

## 2020-11-05 NOTE — PHYSICAL EXAM
[Normal Breath Sounds] : Normal breath sounds [Normal Rate and Rhythm] : normal rate and rhythm [No Rash or Lesion] : No rash or lesion [Alert] : alert [Oriented to Person] : oriented to person [Oriented to Place] : oriented to place [Oriented to Time] : oriented to time [Calm] : calm [JVD] : no jugular venous distention  [de-identified] : A/Ox3; NAD. appears comfortable [de-identified] : EOMI, Anicteric sclera [de-identified] : well healed scar to the L. side ; no recurrence felt. no new masses or lumps to R. breast, no axillary adenopathy. no tenderness to either side  [de-identified] : abd is soft, NT/ND\par  [de-identified] : +ROM, no joint swelling

## 2020-11-05 NOTE — PLAN
[FreeTextEntry1] : continue with SBE\par care as per oncology \par RTO for follow up visit in 6 months\par \par Patient's questions and concerns addressed to their satisfaction, and patient verbalized an understanding of the information discussed.\par

## 2021-01-15 NOTE — ASU PATIENT PROFILE, ADULT - ANESTHESIA, PREVIOUS REACTION, PROFILE
Condition:: Redness Please Describe Your Condition:: Red patches on cheeks x few months.  Noticed when she moved to [place] from [facility].  Unsure of the cause.   Washing face with Aveeno sensitive wash, Aveeno exfoliating, Chloe serum moisturizer at night none

## 2021-02-01 ENCOUNTER — NON-APPOINTMENT (OUTPATIENT)
Age: 81
End: 2021-02-01

## 2021-02-03 NOTE — PATIENT PROFILE ADULT. - NSALCOHOLTYPE_GEN__A_CORE_SD
Patient: Salima MOYER Mock    Procedure Summary     Date: 02/03/21 Room / Location: [facility] OR 04 /  [facility] OR    Anesthesia Start: 1107 Anesthesia Stop: 1139    Procedure: HYSTEROSCOPY  with myomectomy (N/A Vagina) Diagnosis: (N95.0)    Surgeon: Swapnil Regalado DO Provider: Anuj Mathur MD    Anesthesia Type: general ASA Status: 3          Anesthesia Type: general    Vitals  No vitals data found for the desired time range.          Post Anesthesia Care and Evaluation    Patient location during evaluation: PHASE II  Patient participation: complete - patient participated  Level of consciousness: awake  Pain score: 1  Pain management: adequate  Airway patency: patent  Anesthetic complications: No anesthetic complications  PONV Status: none  Cardiovascular status: acceptable  Respiratory status: acceptable  Hydration status: acceptable       socially/wine

## 2021-03-01 ENCOUNTER — APPOINTMENT (OUTPATIENT)
Dept: GYNECOLOGIC ONCOLOGY | Facility: CLINIC | Age: 81
End: 2021-03-01
Payer: MEDICARE

## 2021-03-01 VITALS
WEIGHT: 143 LBS | HEART RATE: 90 BPM | BODY MASS INDEX: 23.82 KG/M2 | HEIGHT: 65 IN | DIASTOLIC BLOOD PRESSURE: 79 MMHG | SYSTOLIC BLOOD PRESSURE: 132 MMHG

## 2021-03-01 PROCEDURE — 99072 ADDL SUPL MATRL&STAF TM PHE: CPT

## 2021-03-01 PROCEDURE — 99213 OFFICE O/P EST LOW 20 MIN: CPT

## 2021-03-02 LAB — CANCER AG125 SERPL-ACNC: 7 U/ML

## 2021-03-22 ENCOUNTER — APPOINTMENT (OUTPATIENT)
Dept: RADIOLOGY | Facility: IMAGING CENTER | Age: 81
End: 2021-03-22
Payer: MEDICARE

## 2021-03-22 ENCOUNTER — OUTPATIENT (OUTPATIENT)
Dept: OUTPATIENT SERVICES | Facility: HOSPITAL | Age: 81
LOS: 1 days | End: 2021-03-22
Payer: COMMERCIAL

## 2021-03-22 DIAGNOSIS — Z98.89 OTHER SPECIFIED POSTPROCEDURAL STATES: Chronic | ICD-10-CM

## 2021-03-22 DIAGNOSIS — Z98.890 OTHER SPECIFIED POSTPROCEDURAL STATES: Chronic | ICD-10-CM

## 2021-03-22 DIAGNOSIS — C56.9 MALIGNANT NEOPLASM OF UNSPECIFIED OVARY: ICD-10-CM

## 2021-03-22 DIAGNOSIS — Z90.710 ACQUIRED ABSENCE OF BOTH CERVIX AND UTERUS: Chronic | ICD-10-CM

## 2021-03-22 PROCEDURE — 77080 DXA BONE DENSITY AXIAL: CPT

## 2021-03-22 PROCEDURE — 77080 DXA BONE DENSITY AXIAL: CPT | Mod: 26

## 2021-05-06 ENCOUNTER — APPOINTMENT (OUTPATIENT)
Dept: SURGERY | Facility: CLINIC | Age: 81
End: 2021-05-06
Payer: MEDICARE

## 2021-05-06 VITALS
HEART RATE: 80 BPM | WEIGHT: 138 LBS | HEIGHT: 65 IN | DIASTOLIC BLOOD PRESSURE: 75 MMHG | SYSTOLIC BLOOD PRESSURE: 131 MMHG | BODY MASS INDEX: 22.99 KG/M2

## 2021-05-06 VITALS — TEMPERATURE: 97 F

## 2021-05-06 PROCEDURE — 99072 ADDL SUPL MATRL&STAF TM PHE: CPT

## 2021-05-06 PROCEDURE — 99213 OFFICE O/P EST LOW 20 MIN: CPT

## 2021-05-06 NOTE — HISTORY OF PRESENT ILLNESS
[de-identified] : 81 y/o female with PMH of hypertension, hyperlipidemia, GERD, ovarian cancer in remission s/p hysterectomy and chemo in 1999 and chemotherapy in 2008, diagnosed with left breast cancer. Patient is s/p left breast total mastectomy with left breast axillary sentinel node biopsy 5/9/18. pathology results are consistent with Metastatic carcinoma involving 1 of 2 lymph nodes and invasive well-differentiated ductal carcinoma. Chemotherapy was discontinued due to the inability to tolerate the treatment.later she also had refused getting chemotherapy. \par Patient's recent breast mammo was done 10/13/20; results c/w no evidence of malignancy, BI RADS cat 1 with one year follow up recommended. Breast US, 10/13/20, BI RADS 2, no evidence of malignancy. \par  [de-identified] : Patient reports no interval changes to her overall health status or medical history. She is feeling well, without reported complaints. Follows up with her GYN oncologist, Dr. Pinto.

## 2021-05-06 NOTE — REASON FOR VISIT
[Follow-Up: _____] : a [unfilled] follow-up visit [FreeTextEntry1] : breast exam, S/P left total mastectomy 2018

## 2021-05-06 NOTE — PLAN
[FreeTextEntry1] : continue care as per oncology and F/U \par continue with self breast exams and annual screening , 10/2021\par \par RTO for follow up visit in 6 months, after breast imaging /sooner if there are any concerns \par \par Patient's questions and concerns addressed to their satisfaction, and patient verbalized an understanding of the information discussed.\par

## 2021-05-06 NOTE — ASSESSMENT
[FreeTextEntry1] : 81 y/o female with PMH of hypertension, hyperlipidemia, GERD, ovarian cancer in remission s/p hysterectomy and chemo in 1999 and chemotherapy in 2008, diagnosed with left breast cancer. Patient is s/p left breast total mastectomy with left breast axillary sentinel node biopsy 5/9/18. Clinically, negative breast exam without recurrence felt, no palpable masses or lumps to either side.

## 2021-05-06 NOTE — PHYSICAL EXAM
[Normal Breath Sounds] : Normal breath sounds [Normal Rate and Rhythm] : normal rate and rhythm [No Rash or Lesion] : No rash or lesion [Alert] : alert [Oriented to Person] : oriented to person [Oriented to Place] : oriented to place [Oriented to Time] : oriented to time [Calm] : calm [JVD] : no jugular venous distention  [de-identified] : A/Ox3; NAD. appears comfortable [de-identified] : EOMI, Anicteric sclera [de-identified] : well healed scar to left side, at mastectomy site ; no recurrence felt. no new masses or lumps to right breast, no axillary lymphadenopathy   [de-identified] : abd is soft, NT/ND\par  [de-identified] : +ROM, no joint swelling

## 2021-08-06 ENCOUNTER — INPATIENT (INPATIENT)
Facility: HOSPITAL | Age: 81
LOS: 4 days | Discharge: HOME | End: 2021-08-11
Attending: STUDENT IN AN ORGANIZED HEALTH CARE EDUCATION/TRAINING PROGRAM | Admitting: STUDENT IN AN ORGANIZED HEALTH CARE EDUCATION/TRAINING PROGRAM
Payer: MEDICARE

## 2021-08-06 VITALS
HEART RATE: 59 BPM | OXYGEN SATURATION: 100 % | SYSTOLIC BLOOD PRESSURE: 144 MMHG | RESPIRATION RATE: 18 BRPM | DIASTOLIC BLOOD PRESSURE: 75 MMHG | TEMPERATURE: 98 F

## 2021-08-06 DIAGNOSIS — Z98.89 OTHER SPECIFIED POSTPROCEDURAL STATES: Chronic | ICD-10-CM

## 2021-08-06 DIAGNOSIS — Z90.710 ACQUIRED ABSENCE OF BOTH CERVIX AND UTERUS: Chronic | ICD-10-CM

## 2021-08-06 DIAGNOSIS — Z98.890 OTHER SPECIFIED POSTPROCEDURAL STATES: Chronic | ICD-10-CM

## 2021-08-06 DIAGNOSIS — Z90.12 ACQUIRED ABSENCE OF LEFT BREAST AND NIPPLE: Chronic | ICD-10-CM

## 2021-08-06 DIAGNOSIS — R55 SYNCOPE AND COLLAPSE: ICD-10-CM

## 2021-08-06 LAB
ALBUMIN SERPL ELPH-MCNC: 4.3 G/DL — SIGNIFICANT CHANGE UP (ref 3.3–5)
ALP SERPL-CCNC: 68 U/L — SIGNIFICANT CHANGE UP (ref 40–120)
ALT FLD-CCNC: 20 U/L — SIGNIFICANT CHANGE UP (ref 4–33)
ANION GAP SERPL CALC-SCNC: 13 MMOL/L — SIGNIFICANT CHANGE UP (ref 7–14)
AST SERPL-CCNC: 22 U/L — SIGNIFICANT CHANGE UP (ref 4–32)
BASOPHILS # BLD AUTO: 0.04 K/UL — SIGNIFICANT CHANGE UP (ref 0–0.2)
BASOPHILS NFR BLD AUTO: 0.7 % — SIGNIFICANT CHANGE UP (ref 0–2)
BILIRUB SERPL-MCNC: 0.6 MG/DL — SIGNIFICANT CHANGE UP (ref 0.2–1.2)
BUN SERPL-MCNC: 12 MG/DL — SIGNIFICANT CHANGE UP (ref 7–23)
CALCIUM SERPL-MCNC: 9.9 MG/DL — SIGNIFICANT CHANGE UP (ref 8.4–10.5)
CHLORIDE SERPL-SCNC: 105 MMOL/L — SIGNIFICANT CHANGE UP (ref 98–107)
CO2 SERPL-SCNC: 25 MMOL/L — SIGNIFICANT CHANGE UP (ref 22–31)
CREAT SERPL-MCNC: 0.77 MG/DL — SIGNIFICANT CHANGE UP (ref 0.5–1.3)
EOSINOPHIL # BLD AUTO: 0.09 K/UL — SIGNIFICANT CHANGE UP (ref 0–0.5)
EOSINOPHIL NFR BLD AUTO: 1.5 % — SIGNIFICANT CHANGE UP (ref 0–6)
GLUCOSE SERPL-MCNC: 88 MG/DL — SIGNIFICANT CHANGE UP (ref 70–99)
HCT VFR BLD CALC: 41.3 % — SIGNIFICANT CHANGE UP (ref 34.5–45)
HGB BLD-MCNC: 13.5 G/DL — SIGNIFICANT CHANGE UP (ref 11.5–15.5)
IANC: 3.56 K/UL — SIGNIFICANT CHANGE UP (ref 1.5–8.5)
IMM GRANULOCYTES NFR BLD AUTO: 0.2 % — SIGNIFICANT CHANGE UP (ref 0–1.5)
LYMPHOCYTES # BLD AUTO: 1.7 K/UL — SIGNIFICANT CHANGE UP (ref 1–3.3)
LYMPHOCYTES # BLD AUTO: 28.3 % — SIGNIFICANT CHANGE UP (ref 13–44)
MAGNESIUM SERPL-MCNC: 1.9 MG/DL — SIGNIFICANT CHANGE UP (ref 1.6–2.6)
MCHC RBC-ENTMCNC: 31.2 PG — SIGNIFICANT CHANGE UP (ref 27–34)
MCHC RBC-ENTMCNC: 32.7 GM/DL — SIGNIFICANT CHANGE UP (ref 32–36)
MCV RBC AUTO: 95.4 FL — SIGNIFICANT CHANGE UP (ref 80–100)
MONOCYTES # BLD AUTO: 0.6 K/UL — SIGNIFICANT CHANGE UP (ref 0–0.9)
MONOCYTES NFR BLD AUTO: 10 % — SIGNIFICANT CHANGE UP (ref 2–14)
NEUTROPHILS # BLD AUTO: 3.56 K/UL — SIGNIFICANT CHANGE UP (ref 1.8–7.4)
NEUTROPHILS NFR BLD AUTO: 59.3 % — SIGNIFICANT CHANGE UP (ref 43–77)
NRBC # BLD: 0 /100 WBCS — SIGNIFICANT CHANGE UP
NRBC # FLD: 0 K/UL — SIGNIFICANT CHANGE UP
PHOSPHATE SERPL-MCNC: 3.4 MG/DL — SIGNIFICANT CHANGE UP (ref 2.5–4.5)
PLATELET # BLD AUTO: 201 K/UL — SIGNIFICANT CHANGE UP (ref 150–400)
POTASSIUM SERPL-MCNC: 4.2 MMOL/L — SIGNIFICANT CHANGE UP (ref 3.5–5.3)
POTASSIUM SERPL-SCNC: 4.2 MMOL/L — SIGNIFICANT CHANGE UP (ref 3.5–5.3)
PROT SERPL-MCNC: 7.7 G/DL — SIGNIFICANT CHANGE UP (ref 6–8.3)
RBC # BLD: 4.33 M/UL — SIGNIFICANT CHANGE UP (ref 3.8–5.2)
RBC # FLD: 13.2 % — SIGNIFICANT CHANGE UP (ref 10.3–14.5)
SODIUM SERPL-SCNC: 143 MMOL/L — SIGNIFICANT CHANGE UP (ref 135–145)
TROPONIN T, HIGH SENSITIVITY RESULT: <6 NG/L — SIGNIFICANT CHANGE UP
WBC # BLD: 6 K/UL — SIGNIFICANT CHANGE UP (ref 3.8–10.5)
WBC # FLD AUTO: 6 K/UL — SIGNIFICANT CHANGE UP (ref 3.8–10.5)

## 2021-08-06 PROCEDURE — 99285 EMERGENCY DEPT VISIT HI MDM: CPT | Mod: 25

## 2021-08-06 PROCEDURE — 71045 X-RAY EXAM CHEST 1 VIEW: CPT | Mod: 26

## 2021-08-06 PROCEDURE — 99223 1ST HOSP IP/OBS HIGH 75: CPT

## 2021-08-06 PROCEDURE — 93010 ELECTROCARDIOGRAM REPORT: CPT

## 2021-08-06 RX ORDER — ASPIRIN/CALCIUM CARB/MAGNESIUM 324 MG
162 TABLET ORAL ONCE
Refills: 0 | Status: COMPLETED | OUTPATIENT
Start: 2021-08-06 | End: 2021-08-06

## 2021-08-06 RX ADMIN — Medication 162 MILLIGRAM(S): at 19:44

## 2021-08-06 NOTE — ED ADULT TRIAGE NOTE - CHIEF COMPLAINT QUOTE
Pt was at a  today became very weak states did not feel well. Per family slouched over. pt wake and alert finger stick 85. pt denies sob or chest pain at this time. daughter tessa [phone], john clancy [phone]

## 2021-08-06 NOTE — ED ADULT NURSE NOTE - CHIEF COMPLAINT QUOTE
Pt was at a  today became very weak states did not feel well. Per family slouched over. pt wake and alert finger stick 85. pt denies sob or chest pain at this time. daughter tessa [phone], john clancy [phone]

## 2021-08-06 NOTE — ED PROVIDER NOTE - PHYSICAL EXAMINATION
PHYSICAL EXAM:  GENERAL: NAD, well-developed  HEAD:  Atraumatic, Normocephalic  EYES: EOMI, PERRLA, conjunctiva and sclera clear  NECK: Supple, No JVD  CHEST/LUNG: Clear to auscultation bilaterally; No wheeze  HEART: Regular rate and rhythm; No murmurs, rubs, or gallops  ABDOMEN: Soft, Nontender, Nondistended; Bowel sounds present  EXTREMITIES:  2+ Peripheral Pulses, No clubbing, cyanosis, or edema  PSYCH: AAOx3  NEUROLOGY: non-focal. CN intact.  SKIN: No rashes or lesions PHYSICAL EXAM:  GENERAL: NAD, well-developed  HEAD:  Atraumatic, Normocephalic  EYES: EOMI, PERRLA, conjunctiva and sclera clear  NECK: Supple, No JVD  CHEST/LUNG: Clear to auscultation bilaterally; No wheeze  HEART: Regular rate and rhythm; No murmurs, rubs, or gallops  ABDOMEN: Soft, Nontender, Nondistended; Bowel sounds present  EXTREMITIES:  2+ Peripheral Pulses, No clubbing, cyanosis, or edema  PSYCH: AAOx3  NEUROLOGY: non-focal. CN 3-12 intact.  SKIN: No rashes or lesions

## 2021-08-06 NOTE — ED PROVIDER NOTE - ATTENDING CONTRIBUTION TO CARE
DR. SANDHU, ATTENDING MD-  I have reviewed and discussed the medical student’s documentation and findings with the student. After personally examining the patient, my findings have been added to this documentation.    81 y/o female with h/o htn hld p/w episode of near syncope today.  EKG demonstrates twi inf/lat leads.  No prior for comparison.  Pt currently asx.  Concern for atypical acs, anemia, lyte abn, arrhythmia.  Obtain cbc cmp trop cxr covid swab give asa admit for further care and evaluation.

## 2021-08-06 NOTE — ED PROVIDER NOTE - NS ED ROS FT
REVIEW OF SYSTEMS:    CONSTITUTIONAL: (+) generalized weakness, fevers or chills  EYES/ENT: No visual changes;  No vertigo or throat pain   NECK: No pain or stiffness  RESPIRATORY: No cough, wheezing, hemoptysis; No shortness of breath  CARDIOVASCULAR: No chest pain or palpitations  GASTROINTESTINAL: No abdominal or epigastric pain. No nausea, vomiting, or hematemesis; No diarrhea or constipation. No melena or hematochezia.  GENITOURINARY: No dysuria, frequency or hematuria  NEUROLOGICAL: No numbness . (+) generalized weakness  SKIN: No itching, rashes

## 2021-08-06 NOTE — ED PROVIDER NOTE - CLINICAL SUMMARY MEDICAL DECISION MAKING FREE TEXT BOX
79 yo F w/ PMH of HTN, HLD, Acid Reflux, Ovarian Cancer (Hysterectomy Bilateral Salpingo-oophorectomy 1999), Left Mastectomy (2019), presenting with atypical presentation of Acute coronary syndrome. ECG with T wave inversions in lateral and inferior leads. Will have ACS work up.

## 2021-08-07 DIAGNOSIS — R41.3 OTHER AMNESIA: ICD-10-CM

## 2021-08-07 DIAGNOSIS — Z02.9 ENCOUNTER FOR ADMINISTRATIVE EXAMINATIONS, UNSPECIFIED: ICD-10-CM

## 2021-08-07 DIAGNOSIS — Z29.9 ENCOUNTER FOR PROPHYLACTIC MEASURES, UNSPECIFIED: ICD-10-CM

## 2021-08-07 DIAGNOSIS — M65.311 TRIGGER THUMB, RIGHT THUMB: ICD-10-CM

## 2021-08-07 DIAGNOSIS — R94.31 ABNORMAL ELECTROCARDIOGRAM [ECG] [EKG]: ICD-10-CM

## 2021-08-07 DIAGNOSIS — R55 SYNCOPE AND COLLAPSE: ICD-10-CM

## 2021-08-07 DIAGNOSIS — I10 ESSENTIAL (PRIMARY) HYPERTENSION: ICD-10-CM

## 2021-08-07 DIAGNOSIS — C50.912 MALIGNANT NEOPLASM OF UNSPECIFIED SITE OF LEFT FEMALE BREAST: ICD-10-CM

## 2021-08-07 LAB
A1C WITH ESTIMATED AVERAGE GLUCOSE RESULT: 5.5 % — SIGNIFICANT CHANGE UP (ref 4–5.6)
A1C WITH ESTIMATED AVERAGE GLUCOSE RESULT: 5.5 % — SIGNIFICANT CHANGE UP (ref 4–5.6)
ALBUMIN SERPL ELPH-MCNC: 3.9 G/DL — SIGNIFICANT CHANGE UP (ref 3.3–5)
ALP SERPL-CCNC: 66 U/L — SIGNIFICANT CHANGE UP (ref 40–120)
ALT FLD-CCNC: 20 U/L — SIGNIFICANT CHANGE UP (ref 4–33)
ANION GAP SERPL CALC-SCNC: 13 MMOL/L — SIGNIFICANT CHANGE UP (ref 7–14)
ANION GAP SERPL CALC-SCNC: 15 MMOL/L — HIGH (ref 7–14)
AST SERPL-CCNC: 22 U/L — SIGNIFICANT CHANGE UP (ref 4–32)
BASOPHILS # BLD AUTO: 0.04 K/UL — SIGNIFICANT CHANGE UP (ref 0–0.2)
BASOPHILS NFR BLD AUTO: 0.7 % — SIGNIFICANT CHANGE UP (ref 0–2)
BILIRUB SERPL-MCNC: 0.6 MG/DL — SIGNIFICANT CHANGE UP (ref 0.2–1.2)
BUN SERPL-MCNC: 10 MG/DL — SIGNIFICANT CHANGE UP (ref 7–23)
BUN SERPL-MCNC: 10 MG/DL — SIGNIFICANT CHANGE UP (ref 7–23)
CALCIUM SERPL-MCNC: 9.4 MG/DL — SIGNIFICANT CHANGE UP (ref 8.4–10.5)
CALCIUM SERPL-MCNC: 9.4 MG/DL — SIGNIFICANT CHANGE UP (ref 8.4–10.5)
CHLORIDE SERPL-SCNC: 101 MMOL/L — SIGNIFICANT CHANGE UP (ref 98–107)
CHLORIDE SERPL-SCNC: 103 MMOL/L — SIGNIFICANT CHANGE UP (ref 98–107)
CHOLEST SERPL-MCNC: 149 MG/DL — SIGNIFICANT CHANGE UP
CHOLEST SERPL-MCNC: 150 MG/DL — SIGNIFICANT CHANGE UP
CO2 SERPL-SCNC: 22 MMOL/L — SIGNIFICANT CHANGE UP (ref 22–31)
CO2 SERPL-SCNC: 24 MMOL/L — SIGNIFICANT CHANGE UP (ref 22–31)
CREAT SERPL-MCNC: 0.7 MG/DL — SIGNIFICANT CHANGE UP (ref 0.5–1.3)
CREAT SERPL-MCNC: 0.71 MG/DL — SIGNIFICANT CHANGE UP (ref 0.5–1.3)
EOSINOPHIL # BLD AUTO: 0.11 K/UL — SIGNIFICANT CHANGE UP (ref 0–0.5)
EOSINOPHIL NFR BLD AUTO: 2 % — SIGNIFICANT CHANGE UP (ref 0–6)
ESTIMATED AVERAGE GLUCOSE: 111 — SIGNIFICANT CHANGE UP
ESTIMATED AVERAGE GLUCOSE: 111 — SIGNIFICANT CHANGE UP
GLUCOSE SERPL-MCNC: 79 MG/DL — SIGNIFICANT CHANGE UP (ref 70–99)
GLUCOSE SERPL-MCNC: 80 MG/DL — SIGNIFICANT CHANGE UP (ref 70–99)
HCT VFR BLD CALC: 39.6 % — SIGNIFICANT CHANGE UP (ref 34.5–45)
HCT VFR BLD CALC: 41.3 % — SIGNIFICANT CHANGE UP (ref 34.5–45)
HDLC SERPL-MCNC: 50 MG/DL — LOW
HDLC SERPL-MCNC: 50 MG/DL — LOW
HGB BLD-MCNC: 13 G/DL — SIGNIFICANT CHANGE UP (ref 11.5–15.5)
HGB BLD-MCNC: 13.8 G/DL — SIGNIFICANT CHANGE UP (ref 11.5–15.5)
IANC: 2.97 K/UL — SIGNIFICANT CHANGE UP (ref 1.5–8.5)
IMM GRANULOCYTES NFR BLD AUTO: 0.4 % — SIGNIFICANT CHANGE UP (ref 0–1.5)
LIPID PNL WITH DIRECT LDL SERPL: 88 MG/DL — SIGNIFICANT CHANGE UP
LIPID PNL WITH DIRECT LDL SERPL: 89 MG/DL — SIGNIFICANT CHANGE UP
LYMPHOCYTES # BLD AUTO: 1.78 K/UL — SIGNIFICANT CHANGE UP (ref 1–3.3)
LYMPHOCYTES # BLD AUTO: 32.4 % — SIGNIFICANT CHANGE UP (ref 13–44)
MAGNESIUM SERPL-MCNC: 1.9 MG/DL — SIGNIFICANT CHANGE UP (ref 1.6–2.6)
MAGNESIUM SERPL-MCNC: 1.9 MG/DL — SIGNIFICANT CHANGE UP (ref 1.6–2.6)
MCHC RBC-ENTMCNC: 31 PG — SIGNIFICANT CHANGE UP (ref 27–34)
MCHC RBC-ENTMCNC: 31.4 PG — SIGNIFICANT CHANGE UP (ref 27–34)
MCHC RBC-ENTMCNC: 32.8 GM/DL — SIGNIFICANT CHANGE UP (ref 32–36)
MCHC RBC-ENTMCNC: 33.4 GM/DL — SIGNIFICANT CHANGE UP (ref 32–36)
MCV RBC AUTO: 94.1 FL — SIGNIFICANT CHANGE UP (ref 80–100)
MCV RBC AUTO: 94.3 FL — SIGNIFICANT CHANGE UP (ref 80–100)
MONOCYTES # BLD AUTO: 0.57 K/UL — SIGNIFICANT CHANGE UP (ref 0–0.9)
MONOCYTES NFR BLD AUTO: 10.4 % — SIGNIFICANT CHANGE UP (ref 2–14)
NEUTROPHILS # BLD AUTO: 2.97 K/UL — SIGNIFICANT CHANGE UP (ref 1.8–7.4)
NEUTROPHILS NFR BLD AUTO: 54.1 % — SIGNIFICANT CHANGE UP (ref 43–77)
NON HDL CHOLESTEROL: 100 MG/DL — SIGNIFICANT CHANGE UP
NON HDL CHOLESTEROL: 99 MG/DL — SIGNIFICANT CHANGE UP
NRBC # BLD: 0 /100 WBCS — SIGNIFICANT CHANGE UP
NRBC # BLD: 0 /100 WBCS — SIGNIFICANT CHANGE UP
NRBC # FLD: 0 K/UL — SIGNIFICANT CHANGE UP
NRBC # FLD: 0 K/UL — SIGNIFICANT CHANGE UP
PHOSPHATE SERPL-MCNC: 3 MG/DL — SIGNIFICANT CHANGE UP (ref 2.5–4.5)
PHOSPHATE SERPL-MCNC: 3 MG/DL — SIGNIFICANT CHANGE UP (ref 2.5–4.5)
PLATELET # BLD AUTO: 184 K/UL — SIGNIFICANT CHANGE UP (ref 150–400)
PLATELET # BLD AUTO: 189 K/UL — SIGNIFICANT CHANGE UP (ref 150–400)
POTASSIUM SERPL-MCNC: 3.9 MMOL/L — SIGNIFICANT CHANGE UP (ref 3.5–5.3)
POTASSIUM SERPL-MCNC: 4 MMOL/L — SIGNIFICANT CHANGE UP (ref 3.5–5.3)
POTASSIUM SERPL-SCNC: 3.9 MMOL/L — SIGNIFICANT CHANGE UP (ref 3.5–5.3)
POTASSIUM SERPL-SCNC: 4 MMOL/L — SIGNIFICANT CHANGE UP (ref 3.5–5.3)
PROT SERPL-MCNC: 7.1 G/DL — SIGNIFICANT CHANGE UP (ref 6–8.3)
RBC # BLD: 4.2 M/UL — SIGNIFICANT CHANGE UP (ref 3.8–5.2)
RBC # BLD: 4.39 M/UL — SIGNIFICANT CHANGE UP (ref 3.8–5.2)
RBC # FLD: 13.3 % — SIGNIFICANT CHANGE UP (ref 10.3–14.5)
RBC # FLD: 13.3 % — SIGNIFICANT CHANGE UP (ref 10.3–14.5)
SARS-COV-2 RNA SPEC QL NAA+PROBE: SIGNIFICANT CHANGE UP
SODIUM SERPL-SCNC: 138 MMOL/L — SIGNIFICANT CHANGE UP (ref 135–145)
SODIUM SERPL-SCNC: 140 MMOL/L — SIGNIFICANT CHANGE UP (ref 135–145)
TRIGL SERPL-MCNC: 57 MG/DL — SIGNIFICANT CHANGE UP
TRIGL SERPL-MCNC: 57 MG/DL — SIGNIFICANT CHANGE UP
TROPONIN T, HIGH SENSITIVITY RESULT: 6 NG/L — SIGNIFICANT CHANGE UP
TSH SERPL-MCNC: 0.89 UIU/ML — SIGNIFICANT CHANGE UP (ref 0.27–4.2)
TSH SERPL-MCNC: 0.91 UIU/ML — SIGNIFICANT CHANGE UP (ref 0.27–4.2)
WBC # BLD: 5.25 K/UL — SIGNIFICANT CHANGE UP (ref 3.8–10.5)
WBC # BLD: 5.49 K/UL — SIGNIFICANT CHANGE UP (ref 3.8–10.5)
WBC # FLD AUTO: 5.25 K/UL — SIGNIFICANT CHANGE UP (ref 3.8–10.5)
WBC # FLD AUTO: 5.49 K/UL — SIGNIFICANT CHANGE UP (ref 3.8–10.5)

## 2021-08-07 PROCEDURE — 70450 CT HEAD/BRAIN W/O DYE: CPT | Mod: 26

## 2021-08-07 PROCEDURE — 99233 SBSQ HOSP IP/OBS HIGH 50: CPT

## 2021-08-07 RX ORDER — AMLODIPINE BESYLATE 2.5 MG/1
5 TABLET ORAL DAILY
Refills: 0 | Status: DISCONTINUED | OUTPATIENT
Start: 2021-08-07 | End: 2021-08-11

## 2021-08-07 RX ORDER — CARVEDILOL PHOSPHATE 80 MG/1
12.5 CAPSULE, EXTENDED RELEASE ORAL EVERY 12 HOURS
Refills: 0 | Status: DISCONTINUED | OUTPATIENT
Start: 2021-08-07 | End: 2021-08-11

## 2021-08-07 RX ORDER — ENOXAPARIN SODIUM 100 MG/ML
40 INJECTION SUBCUTANEOUS DAILY
Refills: 0 | Status: DISCONTINUED | OUTPATIENT
Start: 2021-08-07 | End: 2021-08-07

## 2021-08-07 RX ORDER — PANTOPRAZOLE SODIUM 20 MG/1
40 TABLET, DELAYED RELEASE ORAL
Refills: 0 | Status: DISCONTINUED | OUTPATIENT
Start: 2021-08-07 | End: 2021-08-11

## 2021-08-07 RX ORDER — SODIUM CHLORIDE 9 MG/ML
1000 INJECTION INTRAMUSCULAR; INTRAVENOUS; SUBCUTANEOUS
Refills: 0 | Status: DISCONTINUED | OUTPATIENT
Start: 2021-08-07 | End: 2021-08-09

## 2021-08-07 RX ORDER — ANASTROZOLE 1 MG/1
1 TABLET ORAL DAILY
Refills: 0 | Status: DISCONTINUED | OUTPATIENT
Start: 2021-08-07 | End: 2021-08-11

## 2021-08-07 RX ORDER — ASPIRIN/CALCIUM CARB/MAGNESIUM 324 MG
81 TABLET ORAL DAILY
Refills: 0 | Status: DISCONTINUED | OUTPATIENT
Start: 2021-08-07 | End: 2021-08-07

## 2021-08-07 RX ORDER — LORATADINE 10 MG/1
10 TABLET ORAL DAILY
Refills: 0 | Status: DISCONTINUED | OUTPATIENT
Start: 2021-08-07 | End: 2021-08-11

## 2021-08-07 RX ORDER — SIMVASTATIN 20 MG/1
20 TABLET, FILM COATED ORAL AT BEDTIME
Refills: 0 | Status: DISCONTINUED | OUTPATIENT
Start: 2021-08-07 | End: 2021-08-11

## 2021-08-07 RX ORDER — ASPIRIN/CALCIUM CARB/MAGNESIUM 324 MG
81 TABLET ORAL DAILY
Refills: 0 | Status: DISCONTINUED | OUTPATIENT
Start: 2021-08-07 | End: 2021-08-11

## 2021-08-07 RX ORDER — LOSARTAN POTASSIUM 100 MG/1
50 TABLET, FILM COATED ORAL DAILY
Refills: 0 | Status: DISCONTINUED | OUTPATIENT
Start: 2021-08-07 | End: 2021-08-11

## 2021-08-07 RX ADMIN — LOSARTAN POTASSIUM 50 MILLIGRAM(S): 100 TABLET, FILM COATED ORAL at 05:07

## 2021-08-07 RX ADMIN — PANTOPRAZOLE SODIUM 40 MILLIGRAM(S): 20 TABLET, DELAYED RELEASE ORAL at 05:08

## 2021-08-07 RX ADMIN — SODIUM CHLORIDE 75 MILLILITER(S): 9 INJECTION INTRAMUSCULAR; INTRAVENOUS; SUBCUTANEOUS at 12:40

## 2021-08-07 RX ADMIN — CARVEDILOL PHOSPHATE 12.5 MILLIGRAM(S): 80 CAPSULE, EXTENDED RELEASE ORAL at 17:18

## 2021-08-07 RX ADMIN — ANASTROZOLE 1 MILLIGRAM(S): 1 TABLET ORAL at 11:38

## 2021-08-07 RX ADMIN — CARVEDILOL PHOSPHATE 12.5 MILLIGRAM(S): 80 CAPSULE, EXTENDED RELEASE ORAL at 05:08

## 2021-08-07 RX ADMIN — Medication 81 MILLIGRAM(S): at 17:18

## 2021-08-07 RX ADMIN — AMLODIPINE BESYLATE 5 MILLIGRAM(S): 2.5 TABLET ORAL at 05:08

## 2021-08-07 RX ADMIN — LORATADINE 10 MILLIGRAM(S): 10 TABLET ORAL at 11:38

## 2021-08-07 RX ADMIN — SIMVASTATIN 20 MILLIGRAM(S): 20 TABLET, FILM COATED ORAL at 21:29

## 2021-08-07 NOTE — PROGRESS NOTE ADULT - PROBLEM SELECTOR PLAN 7
Transitions of Care Status:  1.  Name of PCP: Dr. Karo Montalvo  2.  PCP Contacted on Admission: [ ] Y    [ x] N    3.  PCP contacted at Discharge: [ ] Y    [ ] N    [ ] N/A  4.  Post-Discharge Appointment Date and Location:  5.  Summary of Handoff given to PCP:

## 2021-08-07 NOTE — H&P ADULT - RS GEN PE MLT RESP DETAILS PC
breath sounds equal/good air movement/clear to auscultation bilaterally/no chest wall tenderness breath sounds equal/good air movement/clear to auscultation bilaterally/no chest wall tenderness/no rales/no rhonchi/no wheezes

## 2021-08-07 NOTE — H&P ADULT - NEGATIVE ENMT SYMPTOMS
no hearing difficulty/no ear pain/no tinnitus no hearing difficulty/no ear pain/no tinnitus/no vertigo/no sinus symptoms/no nasal congestion/no throat pain/no dysphagia

## 2021-08-07 NOTE — H&P ADULT - PROBLEM SELECTOR PLAN 2
-EKG with TWI in Lateral leads. No active chest pain or sob. Trops negative <6  -Will f/u repeat troponins  -Chest Xray: Clear Lungs. No acute cardiopulmonary pathology -EKG with TWI in Lateral leads. No active chest pain or sob. Trops negative <6  -Will f/u repeat troponin  -Patient currently on telemetry  -Chest Xray: Clear Lungs. No acute cardiopulmonary pathology -EKG with TWI in Lateral leads. No active chest pain or sob. Trops negative <6  -Will f/u repeat troponin  -Patient currently on telemetry  -Cardiology consult in AM   -Chest Xray: Clear Lungs. No acute cardiopulmonary pathology -EKG with TWI in Lateral leads. No active chest pain or sob. Trops negative <6 x1  -Will f/u repeat troponin  -Patient currently on telemetry  -Cardiology consult in AM given worsening TWI and recurrent newar syncope/syncope  -Chest Xray: Clear Lungs. No acute cardiopulmonary pathology

## 2021-08-07 NOTE — H&P ADULT - NEGATIVE CARDIOVASCULAR SYMPTOMS
no chest pain/no palpitations/no dyspnea on exertion/no peripheral edema no chest pain/no palpitations/no dyspnea on exertion/no orthopnea/no peripheral edema

## 2021-08-07 NOTE — H&P ADULT - NSHPSOCIALHISTORY_GEN_ALL_CORE
Lives at home with daughter and grandson  Retired [employer]  Denies Tobacco Usage:  (x ) never smoked   ( ) former smoker  ( ) current smoker; Packs per day:   Alcohol Usage: (x ) none  ( ) occasional ( ) 2-3 times a week ( ) daily; Last drink:   Recreational drugs (x ) None

## 2021-08-07 NOTE — H&P ADULT - PROBLEM SELECTOR PLAN 4
-Oncology Consult??? -PT, OT, & SW consult  -DVT Prophylaxis: Lovenox 40mg subcu -PT, OT, & SW consult  -DVT Prophylaxis: SCDs for now, if CTH negative then consider Lovenox 40mg subcu - Reports being started on donepezil recently, took 3 doses then stopped, here AAO x3, clarify history of memory impairment in AM with family

## 2021-08-07 NOTE — H&P ADULT - PROBLEM SELECTOR PLAN 1
Patient -Patient with near syncope episode after attending close friend's . Episode lasted a maximum of 25 minutes. -No LOC. No active signs of stroke  -CTH ordered to r/o stroke or possible brain bleed  -TTE ordered  -Will order maintenance fluids for hydration  -f/u AM labs   -Encourage PO intake -Patient with near syncope episode after attending close friend's . Episode lasted a maximum of 25 minutes. -No LOC. No active signs of stroke  -CTH ordered to r/o stroke or possible brain bleed  -TTE ordered  -Patient currently on telemetry  -Will order maintenance fluids for hydration  - Consider Neurology consult if symptoms persists or worsen  -f/u AM labs   -Encourage PO intake - Patient with near syncope episode after attending close friend's . Episode lasted a maximum of 25 minutes. - No LOC. No active signs of stroke  - CTH ordered to r/o neurological causes of her symptoms, currently neurologically intact, monitor general neuro checks q8h  - Consider Neurology consult if symptoms persists or worsen  - TTE ordered, prior TTE from 2019 at [facility] showed severe LA dilation, mild LVH, grade 2 diastolic dysfunction, moderate RA enlargement, mild to moderate valvular disease  - Patient currently on telemetry  - Will order maintenance fluids for hydration  - f/u AM labs   - Check orthostatics though low suspicion of orthostasis as symptoms did not occur when standing up  - Encourage PO intake - Patient with near syncope episode after attending close friend's . Episode lasted a maximum of 25 minutes. - No LOC. No active signs of stroke  - CTH ordered to r/o neurological causes of her symptoms, currently neurologically intact, monitor general neuro checks q8h  - Consider Neurology consult if symptoms persists or worsen  - Holding home aspirin for now (no history of CAD/stents nor TIA/CVA), if CTH negative then restart home aspirin  - TTE ordered, prior TTE from 2019 at [facility] showed severe LA dilation, mild LVH, grade 2 diastolic dysfunction, moderate RA enlargement, mild to moderate valvular disease  - Patient currently on telemetry  - Will order maintenance fluids for hydration  - f/u AM labs   - Check orthostatics though low suspicion of orthostasis as symptoms did not occur when standing up  - Encourage PO intake

## 2021-08-07 NOTE — H&P ADULT - ATTENDING COMMENTS
Patient seen and examined on 8/6/21, H&P completed after midnight on 8/7/21. Case discussed with VIRIDIANA Salguero. This is an 80F with history as above who presents to the hospital with complaints of near syncope this afternoon. Was sitting down when symptoms occurred, felt lightheaded but did not have LOC nor head strike. Had similar symptoms ~2 years ago, was hospitalized at [facility] where work up did not reveal active cardiac issues (though TTE was notable for dilated LA/RA, and mild LVH with mild/moderate valvular disease). Patient currently denies any complaints, examination not notable for acute process at present. Lab work wnl, trop neg x1, EKG with inferolateral TWI worsening from prior but patient without active cardiac complaints.   - Would c/w telemetry monitoring, check TTE, work up as above for her near syncope  - Cards eval in AM for her worsening TWI and near syncope  - Other management as above.

## 2021-08-07 NOTE — PHYSICAL THERAPY INITIAL EVALUATION ADULT - LEVEL OF INDEPENDENCE: SIT/STAND, REHAB EVAL
contact guard Xenograft Text: The defect edges were debeveled with a #15 scalpel blade.  Given the location of the defect, shape of the defect and the proximity to free margins a xenograft was deemed most appropriate.  The graft was then trimmed to fit the size of the defect.  The graft was then placed in the primary defect and oriented appropriately.

## 2021-08-07 NOTE — H&P ADULT - NSICDXPASTMEDICALHX_GEN_ALL_CORE_FT
PAST MEDICAL HISTORY:  Breast cancer, left     Fish allergy     Hypertension     Ovarian cancer

## 2021-08-07 NOTE — PHYSICAL THERAPY INITIAL EVALUATION ADULT - PLANNED THERAPY INTERVENTIONS, PT EVAL
Patient left positioned for safety in bed in NAD, call bell in reach, all lines intact. +Bed alarm./balance training/bed mobility training/gait training/strengthening/transfer training

## 2021-08-07 NOTE — H&P ADULT - NEGATIVE NEUROLOGICAL SYMPTOMS
no generalized seizures/no facial palsy no weakness/no generalized seizures/no syncope/no difficulty walking/no headache/no confusion/no facial palsy

## 2021-08-07 NOTE — H&P ADULT - PROBLEM SELECTOR PLAN 7
Patient Education        Abdominal Pain: Care Instructions  Your Care Instructions    Abdominal pain has many possible causes. Some aren't serious and get better on their own in a few days. Others need more testing and treatment. If your pain continues or gets worse, you need to be rechecked and may need more tests to find out what is wrong. You may need surgery to correct the problem. Don't ignore new symptoms, such as fever, nausea and vomiting, urination problems, pain that gets worse, and dizziness. These may be signs of a more serious problem. Your doctor may have recommended a follow-up visit in the next 8 to 12 hours. If you are not getting better, you may need more tests or treatment. The doctor has checked you carefully, but problems can develop later. If you notice any problems or new symptoms, get medical treatment right away. Follow-up care is a key part of your treatment and safety. Be sure to make and go to all appointments, and call your doctor if you are having problems. It's also a good idea to know your test results and keep a list of the medicines you take. How can you care for yourself at home? · Rest until you feel better. · To prevent dehydration, drink plenty of fluids, enough so that your urine is light yellow or clear like water. Choose water and other caffeine-free clear liquids until you feel better. If you have kidney, heart, or liver disease and have to limit fluids, talk with your doctor before you increase the amount of fluids you drink. · If your stomach is upset, eat mild foods, such as rice, dry toast or crackers, bananas, and applesauce. Try eating several small meals instead of two or three large ones. · Wait until 48 hours after all symptoms have gone away before you have spicy foods, alcohol, and drinks that contain caffeine. · Do not eat foods that are high in fat. · Avoid anti-inflammatory medicines such as aspirin, ibuprofen (Advil, Motrin), and naproxen (Aleve). These can cause stomach upset. Talk to your doctor if you take daily aspirin for another health problem. When should you call for help? Call 911 anytime you think you may need emergency care. For example, call if:    · You passed out (lost consciousness).     · You pass maroon or very bloody stools.     · You vomit blood or what looks like coffee grounds.     · You have new, severe belly pain.    Call your doctor now or seek immediate medical care if:    · Your pain gets worse, especially if it becomes focused in one area of your belly.     · You have a new or higher fever.     · Your stools are black and look like tar, or they have streaks of blood.     · You have unexpected vaginal bleeding.     · You have symptoms of a urinary tract infection. These may include:  ? Pain when you urinate. ? Urinating more often than usual.  ? Blood in your urine.     · You are dizzy or lightheaded, or you feel like you may faint.    Watch closely for changes in your health, and be sure to contact your doctor if:    · You are not getting better after 1 day (24 hours). Where can you learn more? Go to http://jorge-claritza.info/. Enter I808 in the search box to learn more about \"Abdominal Pain: Care Instructions. \"  Current as of: June 26, 2019  Content Version: 12.2  © 4546-0296 [employer], [employer]. Care instructions adapted under license by [employer] (which disclaims liability or warranty for this information). If you have questions about a medical condition or this instruction, always ask your healthcare professional. [place] [address] any warranty or liability for your use of this information. Transitions of Care Status:  1.  Name of PCP: Dr. Karo Montalvo  2.  PCP Contacted on Admission: [ ] Y    [ x] N    3.  PCP contacted at Discharge: [ ] Y    [ ] N    [ ] N/A  4.  Post-Discharge Appointment Date and Location:  5.  Summary of Handoff given to PCP:

## 2021-08-07 NOTE — H&P ADULT - PROBLEM SELECTOR PLAN 5
-PT, OT, & SW consult  -DVT Prophylaxis: Heparin subcu?? Transitions of Care Status:  1.  Name of PCP: Dr. Karo Montalvo  2.  PCP Contacted on Admission: [ ] Y    [ x] N    3.  PCP contacted at Discharge: [ ] Y    [ ] N    [ ] N/A  4.  Post-Discharge Appointment Date and Location:  5.  Summary of Handoff given to PCP: - States that has noted relatively new onset of R thumb locking intermittently with associated pain and numbness of the thumb, none currently, able to flex and extend thumb well currently, no pain, no swelling, no numbness currently  - Suspect patient with likely trigger finger of the R thumb, would treat symptomatically as needed

## 2021-08-07 NOTE — H&P ADULT - HISTORY OF PRESENT ILLNESS
Patient is a 79 y/o F with PMHx of HTN, HLD, GERD, Ovarian Cancer s/p Bilateral Salpingo-oopherectomy hysterectomy , Left Masectomy , presenting with generalized weakness and dizziness after attending a close friend's  2021 AM. Patient states that after the , he went to have lunch across the street with her daughter and felt weak and confused. Patient states that during episode, she felt like she was going to pass out but did not pass out. Patient states that this episode lasted for about 10 minutes and that her daughter called the paramedics. Upon arrival, symptoms resolved and patient was assessed by paramedics. Patient later felt uncomfortable and called paramedics again to be transported to hospital for full evaluation. PAtient denies chest pain, palpitations, sweats, nausea, vomiting, chills, abdominal pain, localized weakness, numbness or tingling. Patient and daughter deny facial droop, slurred speech, LOC, changes in urination, hematochezia, and melena.  Patient is a 79 y/o F with PMHx of HTN, HLD, GERD, Ovarian Cancer s/p Bilateral Salpingo-oopherectomy hysterectomy , Left Masectomy , presenting with generalized weakness and dizziness after attending a close friend's  2021 AM. Patient states that after the , she went to a restaurant to have lunch with her daughter and suddenly felt weak and confused. Patient states that during this episode, she felt like she was going to pass out. Patient states that this episode lasted for about 10 minutes and that her daughter called the paramedics. Upon arrival, symptoms resolved and patient was assessed by paramedics and was cleared. Patient later felt similar symptoms again and called paramedics to be transported to the hospital for full evaluation. Patient reports 2 similar episodes in the past. The first episode was s/p chemotherapy treatment 3 years ago and the second episode she was admitted to the hospital for dehydration. During current episode patient reports that she had a light breakfast at 8am before the .  Patient denies chest pain, palpitations, sweats, nausea, vomiting, chills, abdominal pain, localized weakness, numbness or tingling. Patient and daughter deny facial droop, slurred speech, LOC, changes in urination, hematochezia, and melena.  Patient is a 81 y/o F with PMHx of HTN, HLD, GERD, Ovarian Cancer s/p Bilateral Salpingo-oopherectomy hysterectomy , Left Masectomy , presenting with generalized weakness and dizziness after attending a close friend's  2021 AM. Patient states that after the , she went to a restaurant to have lunch with her daughter and suddenly felt weak and confused. Patient states that during this episode, she felt like she was going to pass out. Patient states that this episode lasted for about 10 minutes and that her daughter called the paramedics. Upon arrival, symptoms resolved and patient was assessed by paramedics and was cleared. Patient later felt similar symptoms again and called paramedics to be transported to the hospital for full evaluation. Patient reports 2 similar episodes in the past. The first episode was s/p chemotherapy treatment 3 years ago and the second episode she was admitted to the hospital for dehydration 2 years ago. During current episode patient reports that she had a light breakfast at 8am before the .  Patient denies chest pain, palpitations, headache, sweats, nausea, vomiting, chills, abdominal pain, localized weakness, numbness or tingling. Patient and daughter deny facial droop, slurred speech, LOC, changes in urination, hematochezia, and melena.  Patient is a 81 y/o F with PMHx of HTN, HLD, GERD, Ovarian Cancer s/p Bilateral Salpingo-oopherectomy hysterectomy , Left Masectomy , presenting with generalized weakness and dizziness after attending a close friend's  2021 AM. Patient states that after the , she went to a restaurant to have lunch with her daughter and suddenly felt weak and confused. Patient states that during this episode, she felt like she was going to pass out but did not syncopize not hit her head. Patient states that this episode lasted for about 10 minutes and that her daughter called the paramedics. States that she had ate a full breakfast prior to the . Upon arrival, symptoms resolved and patient was assessed by paramedics and was cleared. Patient later felt similar symptoms again and called paramedics to be transported to the hospital for full evaluation. Patient reports 2 similar episodes in the past. The first episode was s/p chemotherapy treatment 3 years ago and the second episode she was admitted to the hospital for dehydration 2 years ago (was at [facility]. During current episode patient reports that she had a light breakfast at 8am before the .  Patient denies chest pain, palpitations, headache, sweats, nausea, vomiting, chills, abdominal pain, localized weakness, numbness or tingling. Patient and daughter deny facial droop, slurred speech, LOC, changes in urination, hematochezia, and melena.  Patient is a 81 y/o F with PMHx of HTN, HLD, GERD, Ovarian Cancer s/p Bilateral Salpingo-oopherectomy hysterectomy , Left Masectomy , presenting with generalized weakness and dizziness after attending a close friend's  2021 AM. Patient states that after the , she went to a restaurant to have lunch with her daughter and suddenly felt weak and confused. Patient states that during this episode, she felt like she was going to pass out but did not syncopize not hit her head. Patient states that this episode lasted for about 10 minutes and that her daughter called the paramedics. Upon arrival, symptoms resolved and patient was assessed by paramedics and was cleared. Patient later felt similar symptoms again and called paramedics to be transported to the hospital for full evaluation. Patient reports 2 similar episodes in the past. The first episode was s/p chemotherapy treatment 3 years ago and the second episode she was admitted to the hospital for dehydration 2 years ago (was at [facility], MRN [id number]). During current episode patient reports that she had a light breakfast at 8am before the .  Patient denies chest pain, palpitations, headache, sweats, nausea, vomiting, chills, abdominal pain, localized weakness, numbness or tingling. Patient and daughter deny facial droop, slurred speech, LOC, changes in urination, hematochezia, and melena.     On arrival to the ED, her vitals were T 97.7, P 59, /75, RR 18, O2 sat 100% RA. Her lab work did not show any acute changes and her CXR showed clear lungs. Her EKG here showed inferolateral TWI more prominent than prior EKG at [facility] and she was admitted to medicine on telemetry. She was given aspirin 162mg PO x1 in ED.   Patient is a 81 y/o F with PMHx of HTN, HLD, GERD, Ovarian Cancer s/p Bilateral Salpingo-oopherectomy hysterectomy , Left Masectomy , and Memory Impairment presenting with generalized weakness and dizziness after attending a close friend's  2021 AM. Patient states that after the , she went to a restaurant to have lunch with her daughter and suddenly felt weak and confused. Patient states that during this episode, she felt like she was going to pass out but did not syncopize not hit her head. Patient states that this episode lasted for about 10 minutes and that her daughter called the paramedics. Upon arrival, symptoms resolved and patient was assessed by paramedics and was cleared. Patient later felt similar symptoms again and called paramedics to be transported to the hospital for full evaluation. Patient reports 2 similar episodes in the past. The first episode was s/p chemotherapy treatment 3 years ago and the second episode she was admitted to the hospital for dehydration 2 years ago (was at [facility], MRN [id number]). During current episode patient reports that she had a light breakfast at 8am before the .  Patient denies chest pain, palpitations, headache, sweats, nausea, vomiting, chills, abdominal pain, localized weakness, numbness or tingling. Patient and daughter deny facial droop, slurred speech, LOC, changes in urination, hematochezia, and melena.     On arrival to the ED, her vitals were T 97.7, P 59, /75, RR 18, O2 sat 100% RA. Her lab work did not show any acute changes and her CXR showed clear lungs. Her EKG here showed inferolateral TWI more prominent than prior EKG at [facility] and she was admitted to medicine on telemetry. She was given aspirin 162mg PO x1 in ED.

## 2021-08-07 NOTE — H&P ADULT - NSICDXPASTSURGICALHX_GEN_ALL_CORE_FT
PAST SURGICAL HISTORY:  H/O mastectomy, left     H/O of hysterectomy with bilateral oophorectomy

## 2021-08-07 NOTE — H&P ADULT - ASSESSMENT
Patient is a 79 y/o F with PMHx of HTN, HLD, GERD, Ovarian Cancer s/p Bilateral Salpingo-oopherectomy hysterectomy , Left Masectomy , presenting with generalized weakness and dizziness after attending a close friend's  2021 AM. Admitted for Near syncope and EKG changes.  Patient is a 79 y/o F with PMHx of HTN, HLD, GERD, Ovarian Cancer s/p Bilateral Salpingo-oopherectomy hysterectomy , Left Masectomy , and memory impairment presenting with generalized weakness and dizziness after attending a close friend's  2021 AM. Admitted for Near syncope and EKG changes.

## 2021-08-07 NOTE — PHYSICAL THERAPY INITIAL EVALUATION ADULT - PERTINENT HX OF CURRENT PROBLEM, REHAB EVAL
80 year old female with PMHx of HTN, HLD, GERD, Ovarian Cancer s/p Bilateral Salpingo-oopherectomy hysterectomy , Left Masectomy , and Memory Impairment presenting with generalized weakness and dizziness after attending a close friend's  2021 AM. EKG here showed inferolateral TWI more prominent than prior EKG at [facility] and she was admitted to medicine on telemetry.

## 2021-08-07 NOTE — H&P ADULT - NEGATIVE OPHTHALMOLOGIC SYMPTOMS
no diplopia/no photophobia/no blurred vision L/no blurred vision R no diplopia/no photophobia/no blurred vision L/no blurred vision R/no pain L/no pain R

## 2021-08-07 NOTE — H&P ADULT - PROBLEM SELECTOR PLAN 6
Transitions of Care Status:  1.  Name of PCP: Dr. Karo Montalvo  2.  PCP Contacted on Admission: [ ] Y    [ x] N    3.  PCP contacted at Discharge: [ ] Y    [ ] N    [ ] N/A  4.  Post-Discharge Appointment Date and Location:  5.  Summary of Handoff given to PCP: -PT, OT, & SW consult  -DVT Prophylaxis: SCDs for now, if CTH negative then consider Lovenox 40mg subcu

## 2021-08-07 NOTE — H&P ADULT - PROBLEM SELECTOR PLAN 3
- Continue with home meds - Continue with home meds  - On losartan BID at home, currently would c/w daily dosing, increase as needed

## 2021-08-07 NOTE — H&P ADULT - NSHPLABSRESULTS_GEN_ALL_CORE
13.5   6.00  )-----------( 201      ( 06 Aug 2021 20:45 )             41.3       08-06    143  |  105  |  12  ----------------------------<  88  4.2   |  25  |  0.77        EKG: NSR HR 63 Diffused TWI Inferior & Lateral Leads 13.5   6.00  )-----------( 201      ( 06 Aug 2021 20:45 )             41.3       08-06    143  |  105  |  12  ----------------------------<  88  4.2   |  25  |  0.77        EKG: NSR HR 63 Diffused TWI  Lateral Leads    Chest Xray: < from: Xray Chest 1 View AP/PA (08.06.21 @ 19:50) >    IMPRESSION: No acute cardiopulmonary pathology      Clear lungs.    --- End of Report ---    < end of copied text > LAB and ADDITIONAL STUDIES:                        13.5   6.00  )-----------( 201      ( 06 Aug 2021 20:45 )             41.3     08-06    143  |  105  |  12  ----------------------------<  88  4.2   |  25  |  0.77    Ca    9.9      06 Aug 2021 20:45  Phos  3.4     08-06  Mg     1.90     08-06    TPro  7.7  /  Alb  4.3  /  TBili  0.6  /  DBili  x   /  AST  22  /  ALT  20  /  AlkPhos  68  08-06    LIVER FUNCTIONS - ( 06 Aug 2021 20:45 )  Alb: 4.3 g/dL / Pro: 7.7 g/dL / ALK PHOS: 68 U/L / ALT: 20 U/L / AST: 22 U/L / GGT: x           Troponin T, High Sensitivity (08.06.21 @ 20:45)   Troponin T, High Sensitivity Result: <6    EKG: NSR, HR 63, QTc 484, TWI I, II, aVL, V5-V6    Chest Xray: < from: Xray Chest 1 View AP/PA (08.06.21 @ 19:50) >  IMPRESSION: No acute cardiopulmonary pathology    Clear lungs.  --- End of Report ---  < end of copied text >

## 2021-08-07 NOTE — H&P ADULT - GASTROINTESTINAL DETAILS
soft/nontender/no distention soft/nontender/no distention/bowel sounds normal/no guarding/no rigidity

## 2021-08-08 ENCOUNTER — TRANSCRIPTION ENCOUNTER (OUTPATIENT)
Age: 81
End: 2021-08-08

## 2021-08-08 LAB
ANION GAP SERPL CALC-SCNC: 9 MMOL/L — SIGNIFICANT CHANGE UP (ref 7–14)
BUN SERPL-MCNC: 9 MG/DL — SIGNIFICANT CHANGE UP (ref 7–23)
CALCIUM SERPL-MCNC: 9.4 MG/DL — SIGNIFICANT CHANGE UP (ref 8.4–10.5)
CHLORIDE SERPL-SCNC: 102 MMOL/L — SIGNIFICANT CHANGE UP (ref 98–107)
CO2 SERPL-SCNC: 24 MMOL/L — SIGNIFICANT CHANGE UP (ref 22–31)
CREAT SERPL-MCNC: 0.66 MG/DL — SIGNIFICANT CHANGE UP (ref 0.5–1.3)
GLUCOSE SERPL-MCNC: 77 MG/DL — SIGNIFICANT CHANGE UP (ref 70–99)
HCT VFR BLD CALC: 44.8 % — SIGNIFICANT CHANGE UP (ref 34.5–45)
HGB BLD-MCNC: 14.6 G/DL — SIGNIFICANT CHANGE UP (ref 11.5–15.5)
MAGNESIUM SERPL-MCNC: 2.3 MG/DL — SIGNIFICANT CHANGE UP (ref 1.6–2.6)
MCHC RBC-ENTMCNC: 31.1 PG — SIGNIFICANT CHANGE UP (ref 27–34)
MCHC RBC-ENTMCNC: 32.6 GM/DL — SIGNIFICANT CHANGE UP (ref 32–36)
MCV RBC AUTO: 95.5 FL — SIGNIFICANT CHANGE UP (ref 80–100)
NRBC # BLD: 0 /100 WBCS — SIGNIFICANT CHANGE UP
NRBC # FLD: 0 K/UL — SIGNIFICANT CHANGE UP
PHOSPHATE SERPL-MCNC: SIGNIFICANT CHANGE UP MG/DL (ref 2.5–4.5)
PLATELET # BLD AUTO: 185 K/UL — SIGNIFICANT CHANGE UP (ref 150–400)
POTASSIUM SERPL-MCNC: SIGNIFICANT CHANGE UP MMOL/L (ref 3.5–5.3)
POTASSIUM SERPL-SCNC: SIGNIFICANT CHANGE UP MMOL/L (ref 3.5–5.3)
RBC # BLD: 4.69 M/UL — SIGNIFICANT CHANGE UP (ref 3.8–5.2)
RBC # FLD: 13.4 % — SIGNIFICANT CHANGE UP (ref 10.3–14.5)
SODIUM SERPL-SCNC: 135 MMOL/L — SIGNIFICANT CHANGE UP (ref 135–145)
WBC # BLD: 4.85 K/UL — SIGNIFICANT CHANGE UP (ref 3.8–10.5)
WBC # FLD AUTO: 4.85 K/UL — SIGNIFICANT CHANGE UP (ref 3.8–10.5)

## 2021-08-08 PROCEDURE — 99232 SBSQ HOSP IP/OBS MODERATE 35: CPT

## 2021-08-08 RX ADMIN — AMLODIPINE BESYLATE 5 MILLIGRAM(S): 2.5 TABLET ORAL at 05:15

## 2021-08-08 RX ADMIN — CARVEDILOL PHOSPHATE 12.5 MILLIGRAM(S): 80 CAPSULE, EXTENDED RELEASE ORAL at 17:24

## 2021-08-08 RX ADMIN — PANTOPRAZOLE SODIUM 40 MILLIGRAM(S): 20 TABLET, DELAYED RELEASE ORAL at 05:14

## 2021-08-08 RX ADMIN — CARVEDILOL PHOSPHATE 12.5 MILLIGRAM(S): 80 CAPSULE, EXTENDED RELEASE ORAL at 05:15

## 2021-08-08 RX ADMIN — LORATADINE 10 MILLIGRAM(S): 10 TABLET ORAL at 17:24

## 2021-08-08 RX ADMIN — ANASTROZOLE 1 MILLIGRAM(S): 1 TABLET ORAL at 21:48

## 2021-08-08 RX ADMIN — LOSARTAN POTASSIUM 50 MILLIGRAM(S): 100 TABLET, FILM COATED ORAL at 05:15

## 2021-08-08 RX ADMIN — SIMVASTATIN 20 MILLIGRAM(S): 20 TABLET, FILM COATED ORAL at 22:00

## 2021-08-08 RX ADMIN — Medication 81 MILLIGRAM(S): at 17:25

## 2021-08-08 NOTE — DISCHARGE NOTE PROVIDER - HOSPITAL COURSE
79 y/o F with PMHx of HTN, HLD, GERD, Ovarian Cancer s/p Bilateral Salpingo-oopherectomy hysterectomy , Left Masectomy , presenting with generalized weakness and dizziness after attending a close friend's  2021 AM. Admitted for Near syncope and EKG changes.     Hospital course:    Pt admitted with near syncope. CTH noted small vessel white matter ischemic changes. Borderline orthostatic s/p IVF. CE negative. EKG with TWI in Lateral leads. No active chest pain or sob. TTE ****.    PT recommended home PT       Case discussed with Dr Thakur on ****. Reviewed discharge medications with patient; All new medications requiring new prescription sent to pharmacy of patients choice. Reviewed need for prescription for previous home medication and new prescriptions sent if requested. Patient in agreement and understands.    81 y/o F with PMHx of HTN, HLD, GERD, Ovarian Cancer s/p Bilateral Salpingo-oopherectomy hysterectomy , Left Masectomy , presenting with generalized weakness and dizziness after attending a close friend's  2021 AM. Admitted for Near syncope and EKG changes.     Hospital course:    Pt admitted with near syncope. CTH noted small vessel white matter ischemic changes. Borderline orthostatic s/p IVF. CE negative. EKG with TWI in Lateral leads. No active chest pain or sob. TTE ****.    PT recommended home PT       Case discussed with Dr. Aquino on 21.  Reviewed discharge medications with patient; All new medications requiring new prescription sent to pharmacy of patients choice. Reviewed need for prescription for previous home medication and new prescriptions sent if requested. Patient in agreement and understands.    81 y/o F with PMHx of HTN, HLD, GERD, Ovarian Cancer s/p Bilateral Salpingo-oopherectomy hysterectomy , Left Masectomy , presenting with generalized weakness and dizziness after attending a close friend's  2021 AM. Admitted for Near syncope and EKG changes.     Hospital course:    Pt admitted with near syncope. CTH noted small vessel white matter ischemic changes. Borderline orthostatic s/p IVF. CE negative. EKG with TWI in Lateral leads. No active chest pain or sob. TTE ****.    PT recommended home PT       Case discussed with Dr. Aquino on 8/10/21.  Reviewed discharge medications with patient; All new medications requiring new prescription sent to pharmacy of patients choice. Reviewed need for prescription for previous home medication and new prescriptions sent if requested. Patient in agreement and understands.    79 y/o F with PMHx of HTN, HLD, GERD, Ovarian Cancer s/p Bilateral Salpingo-oopherectomy hysterectomy , Left Masectomy , presenting with generalized weakness and dizziness after attending a close friend's  2021 AM. Admitted for Near syncope and EKG changes.     Hospital course:    Pt admitted with near syncope. CTH noted small vessel white matter ischemic changes. Borderline orthostatic s/p IVF. CE negative. EKG with TWI in Lateral leads. No active chest pain or sob.  Echocardiogram showed EF 60%. Mild-moderate mitral regurgitation. Mild diastolic dysfunction (Stage I).  Mild pulmonary hypertension.      PT recommended home PT.    Case discussed with Dr. Aquino on 8/10/21.  Reviewed discharge medications with patient; All new medications requiring new prescription sent to pharmacy of patients choice. Reviewed need for prescription for previous home medication and new prescriptions sent if requested. Patient in agreement and understands.    79 y/o F with PMHx of HTN, HLD, GERD, Ovarian Cancer s/p Bilateral Salpingo-oopherectomy hysterectomy , Left Masectomy , presenting with generalized weakness and dizziness after attending a close friend's  2021 AM. Admitted for Near syncope and EKG changes.     Hospital course:  Patient was admitted with a near syncopal episode. Workup was notable for orthostatic hypotension which improved with IV fluids. Patient was noted to have TWI in lateral leads on EKG so she underwent an TTE which demonstrated mild diastolic dysfunction, mild-mod MR and mild pulmonary hypertension. No segmental wall motion abnormalities noted.     # Syncope   - CT head: small vessel white matter ischemic changes   - Positive orthostatics, improved with IVF   - TTE: no sig valvular dx   - PT: home PT   - Advised patient to stay well hydrated, be cautious when moving positions and to use compression stockings.     Case discussed with Dr. Aquino on 8/10/21.  Reviewed discharge medications with patient; All new medications requiring new prescription sent to pharmacy of patients choice. Reviewed need for prescription for previous home medication and new prescriptions sent if requested. Patient in agreement and understands.

## 2021-08-08 NOTE — DISCHARGE NOTE PROVIDER - NSDCCPCAREPLAN_GEN_ALL_CORE_FT
PRINCIPAL DISCHARGE DIAGNOSIS  Diagnosis: Near syncope  Assessment and Plan of Treatment: CTH no acute finding, TTE****.   Followup with your PMD in 1-2 weeks      SECONDARY DISCHARGE DIAGNOSES  Diagnosis: EKG abnormalities  Assessment and Plan of Treatment: you had TTE with ***    Diagnosis: Hypertension, unspecified type  Assessment and Plan of Treatment: Low sodium and fat diet, continue anti-hypertensive medications, and follow up with primary care physician.     PRINCIPAL DISCHARGE DIAGNOSIS  Diagnosis: Near syncope  Assessment and Plan of Treatment: CTH no acute finding.  Followup with your PMD in 1-2 weeks      SECONDARY DISCHARGE DIAGNOSES  Diagnosis: EKG abnormalities  Assessment and Plan of Treatment: you had TTE with EF 60%. Mild-moderate mitral regurgitation. Mild diastolic dysfunction (Stage I).Mild pulmonary hypertension.    Diagnosis: Hypertension, unspecified type  Assessment and Plan of Treatment: Low sodium and fat diet, continue anti-hypertensive medications, and follow up with primary care physician.

## 2021-08-08 NOTE — DISCHARGE NOTE PROVIDER - NSDCMRMEDTOKEN_GEN_ALL_CORE_FT
amLODIPine 5 mg oral tablet: 1 tab(s) orally once a day  anastrozole 1 mg oral tablet: 1 tab(s) orally once a day  aspirin 81 mg oral tablet:   carvedilol 12.5 mg oral tablet: 1 tab(s) orally 2 times a day  loratadine 10 mg oral tablet: 1 tab(s) orally once a day  losartan 50 mg oral tablet: 1 tab(s) orally 2 times a day  omeprazole 20 mg oral delayed release capsule: 1 cap(s) orally once a day  simvastatin 20 mg oral tablet: 1 tab(s) orally once a day (at bedtime)   amLODIPine 5 mg oral tablet: 1 tab(s) orally once a day  anastrozole 1 mg oral tablet: 1 tab(s) orally once a day  aspirin 81 mg oral tablet:   carvedilol 12.5 mg oral tablet: 1 tab(s) orally 2 times a day  loratadine 10 mg oral tablet: 1 tab(s) orally once a day  losartan 50 mg oral tablet: 1 tab(s) orally 2 times a day  omeprazole 20 mg oral delayed release capsule: 1 cap(s) orally once a day  rolling walker : use as directed for ambulation    PEDRO: 99    unsteady gait   simvastatin 20 mg oral tablet: 1 tab(s) orally once a day (at bedtime)   amLODIPine 5 mg oral tablet: 1 tab(s) orally once a day  anastrozole 1 mg oral tablet: 1 tab(s) orally once a day  aspirin 81 mg oral tablet: 1 tab(s) orally once a day  carvedilol 12.5 mg oral tablet: 1 tab(s) orally 2 times a day  loratadine 10 mg oral tablet: 1 tab(s) orally once a day  losartan 50 mg oral tablet: 1 tab(s) orally 2 times a day  omeprazole 20 mg oral delayed release capsule: 1 cap(s) orally once a day  rolling walker : use as directed for ambulation    PEDRO: 99    unsteady gait   simvastatin 20 mg oral tablet: 1 tab(s) orally once a day (at bedtime)

## 2021-08-08 NOTE — DISCHARGE NOTE PROVIDER - NSDCFUADDAPPT_GEN_ALL_CORE_FT
Please follow up with your outpatient cardiologist or outpatient [facility] Cardiology clinic in 2 weeks.  Call [phone] for an appointment.

## 2021-08-09 LAB
ANION GAP SERPL CALC-SCNC: 15 MMOL/L — HIGH (ref 7–14)
BUN SERPL-MCNC: 12 MG/DL — SIGNIFICANT CHANGE UP (ref 7–23)
CALCIUM SERPL-MCNC: 9.5 MG/DL — SIGNIFICANT CHANGE UP (ref 8.4–10.5)
CHLORIDE SERPL-SCNC: 101 MMOL/L — SIGNIFICANT CHANGE UP (ref 98–107)
CO2 SERPL-SCNC: 25 MMOL/L — SIGNIFICANT CHANGE UP (ref 22–31)
CREAT SERPL-MCNC: 0.77 MG/DL — SIGNIFICANT CHANGE UP (ref 0.5–1.3)
GLUCOSE SERPL-MCNC: 79 MG/DL — SIGNIFICANT CHANGE UP (ref 70–99)
HCT VFR BLD CALC: 42.2 % — SIGNIFICANT CHANGE UP (ref 34.5–45)
HGB BLD-MCNC: 14 G/DL — SIGNIFICANT CHANGE UP (ref 11.5–15.5)
MAGNESIUM SERPL-MCNC: 1.8 MG/DL — SIGNIFICANT CHANGE UP (ref 1.6–2.6)
MCHC RBC-ENTMCNC: 31.3 PG — SIGNIFICANT CHANGE UP (ref 27–34)
MCHC RBC-ENTMCNC: 33.2 GM/DL — SIGNIFICANT CHANGE UP (ref 32–36)
MCV RBC AUTO: 94.4 FL — SIGNIFICANT CHANGE UP (ref 80–100)
NRBC # BLD: 0 /100 WBCS — SIGNIFICANT CHANGE UP
NRBC # FLD: 0 K/UL — SIGNIFICANT CHANGE UP
PHOSPHATE SERPL-MCNC: 3.4 MG/DL — SIGNIFICANT CHANGE UP (ref 2.5–4.5)
PLATELET # BLD AUTO: 193 K/UL — SIGNIFICANT CHANGE UP (ref 150–400)
POTASSIUM SERPL-MCNC: 3.5 MMOL/L — SIGNIFICANT CHANGE UP (ref 3.5–5.3)
POTASSIUM SERPL-SCNC: 3.5 MMOL/L — SIGNIFICANT CHANGE UP (ref 3.5–5.3)
RBC # BLD: 4.47 M/UL — SIGNIFICANT CHANGE UP (ref 3.8–5.2)
RBC # FLD: 13.2 % — SIGNIFICANT CHANGE UP (ref 10.3–14.5)
SODIUM SERPL-SCNC: 141 MMOL/L — SIGNIFICANT CHANGE UP (ref 135–145)
WBC # BLD: 4.58 K/UL — SIGNIFICANT CHANGE UP (ref 3.8–10.5)
WBC # FLD AUTO: 4.58 K/UL — SIGNIFICANT CHANGE UP (ref 3.8–10.5)

## 2021-08-09 PROCEDURE — 93306 TTE W/DOPPLER COMPLETE: CPT | Mod: 26

## 2021-08-09 PROCEDURE — 99232 SBSQ HOSP IP/OBS MODERATE 35: CPT

## 2021-08-09 RX ORDER — POTASSIUM CHLORIDE 20 MEQ
20 PACKET (EA) ORAL ONCE
Refills: 0 | Status: COMPLETED | OUTPATIENT
Start: 2021-08-09 | End: 2021-08-09

## 2021-08-09 RX ORDER — OXYCODONE AND ACETAMINOPHEN 5; 325 MG/1; MG/1
1 TABLET ORAL
Qty: 20 | Refills: 0
Start: 2021-08-09 | End: 2021-08-13

## 2021-08-09 RX ORDER — ENOXAPARIN SODIUM 100 MG/ML
40 INJECTION SUBCUTANEOUS DAILY
Refills: 0 | Status: DISCONTINUED | OUTPATIENT
Start: 2021-08-09 | End: 2021-08-11

## 2021-08-09 RX ADMIN — AMLODIPINE BESYLATE 5 MILLIGRAM(S): 2.5 TABLET ORAL at 05:35

## 2021-08-09 RX ADMIN — PANTOPRAZOLE SODIUM 40 MILLIGRAM(S): 20 TABLET, DELAYED RELEASE ORAL at 05:35

## 2021-08-09 RX ADMIN — Medication 20 MILLIEQUIVALENT(S): at 21:24

## 2021-08-09 RX ADMIN — CARVEDILOL PHOSPHATE 12.5 MILLIGRAM(S): 80 CAPSULE, EXTENDED RELEASE ORAL at 05:35

## 2021-08-09 RX ADMIN — SIMVASTATIN 20 MILLIGRAM(S): 20 TABLET, FILM COATED ORAL at 21:24

## 2021-08-09 RX ADMIN — LORATADINE 10 MILLIGRAM(S): 10 TABLET ORAL at 18:27

## 2021-08-09 RX ADMIN — ENOXAPARIN SODIUM 40 MILLIGRAM(S): 100 INJECTION SUBCUTANEOUS at 21:26

## 2021-08-09 RX ADMIN — ANASTROZOLE 1 MILLIGRAM(S): 1 TABLET ORAL at 18:26

## 2021-08-09 RX ADMIN — CARVEDILOL PHOSPHATE 12.5 MILLIGRAM(S): 80 CAPSULE, EXTENDED RELEASE ORAL at 18:26

## 2021-08-09 RX ADMIN — Medication 81 MILLIGRAM(S): at 18:26

## 2021-08-09 RX ADMIN — LOSARTAN POTASSIUM 50 MILLIGRAM(S): 100 TABLET, FILM COATED ORAL at 05:40

## 2021-08-10 LAB
ANION GAP SERPL CALC-SCNC: 13 MMOL/L — SIGNIFICANT CHANGE UP (ref 7–14)
BUN SERPL-MCNC: 16 MG/DL — SIGNIFICANT CHANGE UP (ref 7–23)
CALCIUM SERPL-MCNC: 9.7 MG/DL — SIGNIFICANT CHANGE UP (ref 8.4–10.5)
CHLORIDE SERPL-SCNC: 102 MMOL/L — SIGNIFICANT CHANGE UP (ref 98–107)
CO2 SERPL-SCNC: 25 MMOL/L — SIGNIFICANT CHANGE UP (ref 22–31)
COVID-19 SPIKE DOMAIN AB INTERP: POSITIVE
COVID-19 SPIKE DOMAIN ANTIBODY RESULT: >250 U/ML — HIGH
CREAT SERPL-MCNC: 0.78 MG/DL — SIGNIFICANT CHANGE UP (ref 0.5–1.3)
GLUCOSE SERPL-MCNC: 72 MG/DL — SIGNIFICANT CHANGE UP (ref 70–99)
POTASSIUM SERPL-MCNC: 3.5 MMOL/L — SIGNIFICANT CHANGE UP (ref 3.5–5.3)
POTASSIUM SERPL-SCNC: 3.5 MMOL/L — SIGNIFICANT CHANGE UP (ref 3.5–5.3)
SARS-COV-2 IGG+IGM SERPL QL IA: >250 U/ML — HIGH
SARS-COV-2 IGG+IGM SERPL QL IA: POSITIVE
SODIUM SERPL-SCNC: 140 MMOL/L — SIGNIFICANT CHANGE UP (ref 135–145)

## 2021-08-10 PROCEDURE — 99232 SBSQ HOSP IP/OBS MODERATE 35: CPT

## 2021-08-10 RX ORDER — SODIUM CHLORIDE 9 MG/ML
1000 INJECTION, SOLUTION INTRAVENOUS
Refills: 0 | Status: DISCONTINUED | OUTPATIENT
Start: 2021-08-10 | End: 2021-08-11

## 2021-08-10 RX ORDER — POTASSIUM CHLORIDE 20 MEQ
20 PACKET (EA) ORAL ONCE
Refills: 0 | Status: COMPLETED | OUTPATIENT
Start: 2021-08-10 | End: 2021-08-10

## 2021-08-10 RX ADMIN — ANASTROZOLE 1 MILLIGRAM(S): 1 TABLET ORAL at 13:09

## 2021-08-10 RX ADMIN — PANTOPRAZOLE SODIUM 40 MILLIGRAM(S): 20 TABLET, DELAYED RELEASE ORAL at 05:35

## 2021-08-10 RX ADMIN — CARVEDILOL PHOSPHATE 12.5 MILLIGRAM(S): 80 CAPSULE, EXTENDED RELEASE ORAL at 05:35

## 2021-08-10 RX ADMIN — SIMVASTATIN 20 MILLIGRAM(S): 20 TABLET, FILM COATED ORAL at 21:07

## 2021-08-10 RX ADMIN — LORATADINE 10 MILLIGRAM(S): 10 TABLET ORAL at 13:09

## 2021-08-10 RX ADMIN — CARVEDILOL PHOSPHATE 12.5 MILLIGRAM(S): 80 CAPSULE, EXTENDED RELEASE ORAL at 17:38

## 2021-08-10 RX ADMIN — Medication 81 MILLIGRAM(S): at 13:09

## 2021-08-10 RX ADMIN — AMLODIPINE BESYLATE 5 MILLIGRAM(S): 2.5 TABLET ORAL at 05:35

## 2021-08-10 RX ADMIN — SODIUM CHLORIDE 100 MILLILITER(S): 9 INJECTION, SOLUTION INTRAVENOUS at 09:49

## 2021-08-10 RX ADMIN — ENOXAPARIN SODIUM 40 MILLIGRAM(S): 100 INJECTION SUBCUTANEOUS at 13:10

## 2021-08-10 RX ADMIN — LOSARTAN POTASSIUM 50 MILLIGRAM(S): 100 TABLET, FILM COATED ORAL at 05:35

## 2021-08-10 RX ADMIN — Medication 20 MILLIEQUIVALENT(S): at 10:16

## 2021-08-11 ENCOUNTER — TRANSCRIPTION ENCOUNTER (OUTPATIENT)
Age: 81
End: 2021-08-11

## 2021-08-11 VITALS
HEART RATE: 83 BPM | RESPIRATION RATE: 18 BRPM | SYSTOLIC BLOOD PRESSURE: 137 MMHG | OXYGEN SATURATION: 100 % | DIASTOLIC BLOOD PRESSURE: 90 MMHG | TEMPERATURE: 98 F

## 2021-08-11 LAB
ANION GAP SERPL CALC-SCNC: 14 MMOL/L — SIGNIFICANT CHANGE UP (ref 7–14)
BUN SERPL-MCNC: 12 MG/DL — SIGNIFICANT CHANGE UP (ref 7–23)
CALCIUM SERPL-MCNC: 9.7 MG/DL — SIGNIFICANT CHANGE UP (ref 8.4–10.5)
CHLORIDE SERPL-SCNC: 102 MMOL/L — SIGNIFICANT CHANGE UP (ref 98–107)
CO2 SERPL-SCNC: 26 MMOL/L — SIGNIFICANT CHANGE UP (ref 22–31)
CREAT SERPL-MCNC: 0.79 MG/DL — SIGNIFICANT CHANGE UP (ref 0.5–1.3)
GLUCOSE SERPL-MCNC: 73 MG/DL — SIGNIFICANT CHANGE UP (ref 70–99)
MAGNESIUM SERPL-MCNC: 1.8 MG/DL — SIGNIFICANT CHANGE UP (ref 1.6–2.6)
PHOSPHATE SERPL-MCNC: 3.3 MG/DL — SIGNIFICANT CHANGE UP (ref 2.5–4.5)
POTASSIUM SERPL-MCNC: 3.9 MMOL/L — SIGNIFICANT CHANGE UP (ref 3.5–5.3)
POTASSIUM SERPL-SCNC: 3.9 MMOL/L — SIGNIFICANT CHANGE UP (ref 3.5–5.3)
SODIUM SERPL-SCNC: 142 MMOL/L — SIGNIFICANT CHANGE UP (ref 135–145)

## 2021-08-11 PROCEDURE — 99238 HOSP IP/OBS DSCHRG MGMT 30/<: CPT

## 2021-08-11 RX ADMIN — LORATADINE 10 MILLIGRAM(S): 10 TABLET ORAL at 12:53

## 2021-08-11 RX ADMIN — CARVEDILOL PHOSPHATE 12.5 MILLIGRAM(S): 80 CAPSULE, EXTENDED RELEASE ORAL at 05:19

## 2021-08-11 RX ADMIN — PANTOPRAZOLE SODIUM 40 MILLIGRAM(S): 20 TABLET, DELAYED RELEASE ORAL at 05:19

## 2021-08-11 RX ADMIN — Medication 81 MILLIGRAM(S): at 12:53

## 2021-08-11 RX ADMIN — ANASTROZOLE 1 MILLIGRAM(S): 1 TABLET ORAL at 12:53

## 2021-08-11 RX ADMIN — AMLODIPINE BESYLATE 5 MILLIGRAM(S): 2.5 TABLET ORAL at 05:19

## 2021-08-11 RX ADMIN — LOSARTAN POTASSIUM 50 MILLIGRAM(S): 100 TABLET, FILM COATED ORAL at 05:19

## 2021-08-11 NOTE — PROGRESS NOTE ADULT - PROBLEM SELECTOR PLAN 4
- Reports being started on donepezil recently, took 3 doses then stopped, here AAO x3, clarify history of memory impairment in AM with family
- Reports being started on donepezil recently, took 3 doses then stopped, here AAO x3
- Reports being started on donepezil recently, took 3 doses then stopped, here AAO x3
- Reports being started on donepezil recently, took 3 doses then stopped, here AAO x3, clarify history of memory impairment in AM with family
- Reports being started on donepezil recently, took 3 doses then stopped, here AAO x3

## 2021-08-11 NOTE — PROGRESS NOTE ADULT - SUBJECTIVE AND OBJECTIVE BOX
Merlin Mathew, MD   Hospitalist  Pager #[phone]    PROGRESS NOTE:     Patient is a 80y old  Female who presents with a chief complaint of Near Syncope, EKG Changes (08 Aug 2021 14:46)      SUBJECTIVE / OVERNIGHT EVENTS: No overnight events   Patient feels well, no complaints. Denies any lightheadedness, dizziness, has been ambulating well, eating and drinking well.   Daughter parrish updated over the phone     ADDITIONAL REVIEW OF SYSTEMS:    MEDICATIONS  (STANDING):  amLODIPine   Tablet 5 milliGRAM(s) Oral daily  anastrozole 1 milliGRAM(s) Oral daily  aspirin enteric coated 81 milliGRAM(s) Oral daily  carvedilol 12.5 milliGRAM(s) Oral every 12 hours  loratadine 10 milliGRAM(s) Oral daily  losartan 50 milliGRAM(s) Oral daily  pantoprazole    Tablet 40 milliGRAM(s) Oral before breakfast  potassium chloride    Tablet ER 20 milliEquivalent(s) Oral once  simvastatin 20 milliGRAM(s) Oral at bedtime    MEDICATIONS  (PRN):      CAPILLARY BLOOD GLUCOSE        I&O's Summary      PHYSICAL EXAM:  Vital Signs Last 24 Hrs  T(C): 36.9 (09 Aug 2021 10:13), Max: 36.9 (09 Aug 2021 10:13)  T(F): 98.5 (09 Aug 2021 10:13), Max: 98.5 (09 Aug 2021 10:13)  HR: 77 (09 Aug 2021 10:13) (75 - 80)  BP: 132/73 (09 Aug 2021 10:13) (113/66 - 147/90)  BP(mean): --  RR: 17 (09 Aug 2021 10:13) (17 - 18)  SpO2: 96% (09 Aug 2021 10:13) (96% - 99%)    CONSTITUTIONAL: NAD, well-developed  RESPIRATORY: Normal respiratory effort; lungs are clear to auscultation bilaterally  CARDIOVASCULAR: Regular rate and rhythm, normal S1 and S2, no murmur/rub/gallop; No lower extremity edema; Peripheral pulses are 2+ bilaterally  ABDOMEN: Nontender to palpation, normoactive bowel sounds, no rebound/guarding; No hepatosplenomegaly  MUSCLOSKELETAL: no clubbing or cyanosis of digits; no joint swelling or tenderness to palpation  PSYCH: A+O to person, place, and time; affect appropriate    LABS:                        14.0   4.58  )-----------( 193      ( 09 Aug 2021 08:09 )             42.2     08-09    141  |  101  |  12  ----------------------------<  79  3.5   |  25  |  0.77    Ca    9.5      09 Aug 2021 08:09  Phos  3.4     08-09  Mg     1.80     08-09                  RADIOLOGY & ADDITIONAL TESTS:  Results Reviewed:   Imaging Personally Reviewed:  Electrocardiogram Personally Reviewed:    COORDINATION OF CARE:  Care Discussed with Consultants/Other Providers [Y/N]:  Prior or Outpatient Records Reviewed [Y/N]:  
Patient is a 80y old  Female who presents with a chief complaint of Near Syncope, EKG Changes (07 Aug 2021 01:54)      SUBJECTIVE / OVERNIGHT EVENTS:    Patient seen and examined at bedside. No synocpal events overnight, no chest pain, dyspnea, tolerating po intake.     MEDICATIONS  (STANDING):  amLODIPine   Tablet 5 milliGRAM(s) Oral daily  anastrozole 1 milliGRAM(s) Oral daily  carvedilol 12.5 milliGRAM(s) Oral every 12 hours  loratadine 10 milliGRAM(s) Oral daily  losartan 50 milliGRAM(s) Oral daily  pantoprazole    Tablet 40 milliGRAM(s) Oral before breakfast  simvastatin 20 milliGRAM(s) Oral at bedtime  sodium chloride 0.9%. 1000 milliLiter(s) (75 mL/Hr) IV Continuous <Continuous>    MEDICATIONS  (PRN):      Vital Signs Last 24 Hrs  T(C): 36.7 (07 Aug 2021 12:30), Max: 36.9 (07 Aug 2021 00:53)  T(F): 98 (07 Aug 2021 12:30), Max: 98.4 (07 Aug 2021 00:53)  HR: 63 (07 Aug 2021 12:30) (59 - 63)  BP: 142/68 (07 Aug 2021 12:30) (142/68 - 150/71)  BP(mean): 86 (07 Aug 2021 12:30) (86 - 86)  RR: 18 (07 Aug 2021 12:30) (18 - 18)  SpO2: 99% (07 Aug 2021 12:30) (98% - 100%)  CAPILLARY BLOOD GLUCOSE      POCT Blood Glucose.: 87 mg/dL (06 Aug 2021 16:09)    I&O's Summary      PHYSICAL EXAM:  Vital Signs Last 24 Hrs  T(C): 36.7 (08-07-21 @ 12:30)  T(F): 98 (08-07-21 @ 12:30), Max: 98.4 (08-07-21 @ 00:53)  HR: 63 (08-07-21 @ 12:30) (59 - 63)  BP: 142/68 (08-07-21 @ 12:30)  BP(mean): 86 (08-07-21 @ 12:30) (86 - 86)  RR: 18 (08-07-21 @ 12:30) (18 - 18)  SpO2: 99% (08-07-21 @ 12:30) (98% - 100%)  Wt(kg): --    Constitutional: NAD, awake and alert  EYES: EOMI  ENT:  Normal Hearing, no tonsillar exudates   Neck: Soft and supple , no thyromegaly   Respiratory: Breath sounds are clear bilaterally, No wheezing, rales or rhonchi  Cardiovascular: S1 and S2, regular rate and rhythm, no Murmurs, gallops or rubs, no JVD,    Gastrointestinal: Bowel Sounds present, soft, nontender, nondistended, no guarding, no rebound  Extremities: No cyanosis or clubbing; warm to touch  Vascular: 2+ peripheral pulses lower ex  Neurological: No focal deficits, CN II-XII intact bilaterally, sensation to light touch intact in all extremities Pupils are equally reactive to light and symmetrical in size.   Musculoskeletal: 5/5 strength b/l upper and lower extremities; no joint swelling.  Skin: No rashes  Psych: no depression or anhedonia, AAOx3  HEME: no bruises, no nose bleeds      LABS:                        13.0   5.49  )-----------( 184      ( 07 Aug 2021 06:38 )             39.6     08-07    138  |  101  |  10  ----------------------------<  80  3.9   |  24  |  0.71    Ca    9.4      07 Aug 2021 06:38  Phos  3.0     08-07  Mg     1.90     08-07    TPro  7.1  /  Alb  3.9  /  TBili  0.6  /  DBili  x   /  AST  22  /  ALT  20  /  AlkPhos  66  08-07              RADIOLOGY & ADDITIONAL TESTS:    < from: CT Head No Cont (08.07.21 @ 11:22) >        The fourth, third and lateral ventricles are normal size and position. There is no hemorrhage, mass or shift of the midline structures. Small vessel white matter ischemic changes are noted. Bone window examination is unremarkable.    IMPRESSION: Small vessel white matter ischemic changes. No hemorrhage.    < end of copied text >      Imaging Personally Reviewed:    Consultant(s) Notes Reviewed:      Care Discussed with Consultants/Other Providers:  
Merlin Mathew, MD   Hospitalist  Pager #[phone]    PROGRESS NOTE:     Patient is a 80y old  Female who presents with a chief complaint of Near Syncope, EKG Changes (08 Aug 2021 14:46)      SUBJECTIVE / OVERNIGHT EVENTS:   Patient feels well today. Does note some lightheadedness when going from laying down to standing up quickly. No other complaints/questions.       ADDITIONAL REVIEW OF SYSTEMS:    MEDICATIONS  (STANDING):  amLODIPine   Tablet 5 milliGRAM(s) Oral daily  anastrozole 1 milliGRAM(s) Oral daily  aspirin enteric coated 81 milliGRAM(s) Oral daily  carvedilol 12.5 milliGRAM(s) Oral every 12 hours  loratadine 10 milliGRAM(s) Oral daily  losartan 50 milliGRAM(s) Oral daily  pantoprazole    Tablet 40 milliGRAM(s) Oral before breakfast  potassium chloride    Tablet ER 20 milliEquivalent(s) Oral once  simvastatin 20 milliGRAM(s) Oral at bedtime    MEDICATIONS  (PRN):      CAPILLARY BLOOD GLUCOSE        I&O's Summary      PHYSICAL EXAM:  Vital Signs Last 24 Hrs  T(C): 36.9 (09 Aug 2021 10:13), Max: 36.9 (09 Aug 2021 10:13)  T(F): 98.5 (09 Aug 2021 10:13), Max: 98.5 (09 Aug 2021 10:13)  HR: 77 (09 Aug 2021 10:13) (75 - 80)  BP: 132/73 (09 Aug 2021 10:13) (113/66 - 147/90)  BP(mean): --  RR: 17 (09 Aug 2021 10:13) (17 - 18)  SpO2: 96% (09 Aug 2021 10:13) (96% - 99%)    CONSTITUTIONAL: NAD, well-developed  RESPIRATORY: Normal respiratory effort; lungs are clear to auscultation bilaterally  CARDIOVASCULAR: Regular rate and rhythm, normal S1 and S2, no murmur/rub/gallop; No lower extremity edema; Peripheral pulses are 2+ bilaterally  ABDOMEN: Nontender to palpation, normoactive bowel sounds, no rebound/guarding; No hepatosplenomegaly  MUSCULOSKELETAL no clubbing or cyanosis of digits; no joint swelling or tenderness to palpation  PSYCH: A+O to person, place, and time; affect appropriate    LABS:    10 Aug 2021 06:56    140    |  102    |  16     ----------------------------<  72     3.5     |  25     |  0.78     Ca    9.7        10 Aug 2021 06:56          RADIOLOGY & ADDITIONAL TESTS:  Results Reviewed:   Imaging Personally Reviewed:  Electrocardiogram Personally Reviewed:    COORDINATION OF CARE:  Care Discussed with Consultants/Other Providers [Y/N]: YLeanna KEMP, CHRIS, KENNY   Prior or Outpatient Records Reviewed [Y/N]:  
Merlin Mathew, MD   Hospitalist  Pager #[phone]    PROGRESS NOTE:     Patient is a 80y old  Female who presents with a chief complaint of Near Syncope, EKG Changes (08 Aug 2021 14:46)      SUBJECTIVE / OVERNIGHT EVENTS:   Patient reported feeling dizzy this AM, orthostatics positive.   Has been eating and drinking well, no diarrhea, N/V.   Daughter updated over the phone     ADDITIONAL REVIEW OF SYSTEMS:    MEDICATIONS  (STANDING):  amLODIPine   Tablet 5 milliGRAM(s) Oral daily  anastrozole 1 milliGRAM(s) Oral daily  aspirin enteric coated 81 milliGRAM(s) Oral daily  carvedilol 12.5 milliGRAM(s) Oral every 12 hours  loratadine 10 milliGRAM(s) Oral daily  losartan 50 milliGRAM(s) Oral daily  pantoprazole    Tablet 40 milliGRAM(s) Oral before breakfast  potassium chloride    Tablet ER 20 milliEquivalent(s) Oral once  simvastatin 20 milliGRAM(s) Oral at bedtime    MEDICATIONS  (PRN):      CAPILLARY BLOOD GLUCOSE        I&O's Summary      PHYSICAL EXAM:  Vital Signs Last 24 Hrs  T(C): 36.9 (09 Aug 2021 10:13), Max: 36.9 (09 Aug 2021 10:13)  T(F): 98.5 (09 Aug 2021 10:13), Max: 98.5 (09 Aug 2021 10:13)  HR: 77 (09 Aug 2021 10:13) (75 - 80)  BP: 132/73 (09 Aug 2021 10:13) (113/66 - 147/90)  BP(mean): --  RR: 17 (09 Aug 2021 10:13) (17 - 18)  SpO2: 96% (09 Aug 2021 10:13) (96% - 99%)    CONSTITUTIONAL: NAD, well-developed  RESPIRATORY: Normal respiratory effort; lungs are clear to auscultation bilaterally  CARDIOVASCULAR: Regular rate and rhythm, normal S1 and S2, no murmur/rub/gallop; No lower extremity edema; Peripheral pulses are 2+ bilaterally  ABDOMEN: Nontender to palpation, normoactive bowel sounds, no rebound/guarding; No hepatosplenomegaly  MUSCULOSKELETAL no clubbing or cyanosis of digits; no joint swelling or tenderness to palpation  PSYCH: A+O to person, place, and time; affect appropriate    LABS:    10 Aug 2021 06:56    140    |  102    |  16     ----------------------------<  72     3.5     |  25     |  0.78     Ca    9.7        10 Aug 2021 06:56          RADIOLOGY & ADDITIONAL TESTS:  Results Reviewed:   Imaging Personally Reviewed:  Electrocardiogram Personally Reviewed:    COORDINATION OF CARE:  Care Discussed with Consultants/Other Providers [Y/N]: YLeanna KEMP CM, KENNY   Prior or Outpatient Records Reviewed [Y/N]:  
Patient is a 80y old  Female who presents with a chief complaint of Near Syncope, EKG Changes (08 Aug 2021 10:20)      SUBJECTIVE / OVERNIGHT EVENTS:    Patient seen and examined at bedside. No chest pain, dyspena overnight, tolerating po intake, no syncopal episodes, feels good on feet    MEDICATIONS  (STANDING):  amLODIPine   Tablet 5 milliGRAM(s) Oral daily  anastrozole 1 milliGRAM(s) Oral daily  aspirin enteric coated 81 milliGRAM(s) Oral daily  carvedilol 12.5 milliGRAM(s) Oral every 12 hours  loratadine 10 milliGRAM(s) Oral daily  losartan 50 milliGRAM(s) Oral daily  pantoprazole    Tablet 40 milliGRAM(s) Oral before breakfast  simvastatin 20 milliGRAM(s) Oral at bedtime  sodium chloride 0.9%. 1000 milliLiter(s) (75 mL/Hr) IV Continuous <Continuous>    MEDICATIONS  (PRN):      Vital Signs Last 24 Hrs  T(C): 36.4 (08 Aug 2021 12:29), Max: 36.6 (07 Aug 2021 17:15)  T(F): 97.6 (08 Aug 2021 12:29), Max: 97.8 (07 Aug 2021 17:15)  HR: 71 (08 Aug 2021 12:29) (62 - 71)  BP: 131/65 (08 Aug 2021 12:29) (127/69 - 147/87)  BP(mean): 91 (07 Aug 2021 17:15) (91 - 91)  RR: 18 (08 Aug 2021 12:29) (18 - 18)  SpO2: 99% (08 Aug 2021 12:29) (99% - 99%)  CAPILLARY BLOOD GLUCOSE        I&O's Summary      PHYSICAL EXAM:  Vital Signs Last 24 Hrs  T(C): 36.4 (08-08-21 @ 12:29)  T(F): 97.6 (08-08-21 @ 12:29), Max: 97.8 (08-07-21 @ 17:15)  HR: 71 (08-08-21 @ 12:29) (62 - 71)  BP: 131/65 (08-08-21 @ 12:29)  BP(mean): 91 (08-07-21 @ 17:15) (91 - 91)  RR: 18 (08-08-21 @ 12:29) (18 - 18)  SpO2: 99% (08-08-21 @ 12:29) (99% - 99%)  Wt(kg): --    Constitutional: NAD, awake and alert  EYES: EOMI  ENT:  Normal Hearing, no tonsillar exudates   Neck: Soft and supple , no thyromegaly   Respiratory: Breath sounds are clear bilaterally, No wheezing, rales or rhonchi  Cardiovascular: S1 and S2, regular rate and rhythm, no Murmurs, gallops or rubs, no JVD,    Gastrointestinal: Bowel Sounds present, soft, nontender, nondistended, no guarding, no rebound  Extremities: No cyanosis or clubbing; warm to touch  Vascular: 2+ peripheral pulses lower ex  Neurological: No focal deficits, CN II-XII intact bilaterally, sensation to light touch intact in all extremities, gait intact. Pupils are equally reactive to light and symmetrical in size.   Musculoskeletal: 5/5 strength b/l upper and lower extremities; no joint swelling.  Skin: No rashes  Psych: no depression or anhedonia, AAOx3  HEME: no bruises, no nose bleeds      LABS:                        14.6   4.85  )-----------( 185      ( 08 Aug 2021 07:23 )             44.8     08-08    135  |  102  |  9   ----------------------------<  77  TNP   |  24  |  0.66    Ca    9.4      08 Aug 2021 07:23  Phos  TNP     08-08  Mg     2.30     08-08    TPro  7.1  /  Alb  3.9  /  TBili  0.6  /  DBili  x   /  AST  22  /  ALT  20  /  AlkPhos  66  08-07              RADIOLOGY & ADDITIONAL TESTS:    Imaging Personally Reviewed:    Consultant(s) Notes Reviewed:      Care Discussed with Consultants/Other Providers:

## 2021-08-11 NOTE — PROGRESS NOTE ADULT - PROBLEM SELECTOR PLAN 2
- EKG with TWI in Lateral leads. No active chest pain or sob. Trops negative <6 x2  - Telemetry: 4 beats of NSVT, no other events  - TTE: mild-mod MR, grossly normal LV with mild diastolic dysfunction   - DC telemetry
-EKG with TWI in Lateral leads. No active chest pain or sob. Trops negative <6 x2  -Patient currently on telemetry: no events   [  ]TTE
- EKG with TWI in Lateral leads. No active chest pain or sob. Trops negative <6 x2  - Telemetry: 4 beats of NSVT, no other events  - TTE: mild-mod MR, grossly normal LV with mild diastolic dysfunction   - DC telemetry
-EKG with TWI in Lateral leads. No active chest pain or sob. Trops negative <6 x1  -Will f/u repeat troponin  -Patient currently on telemetry  - f/u TTE  - no active CP
-EKG with TWI in Lateral leads. No active chest pain or sob. Trops negative <6 x1  -Will f/u repeat troponin  -Patient currently on telemetry  - f/u TTE

## 2021-08-11 NOTE — PROGRESS NOTE ADULT - PROBLEM SELECTOR PLAN 5
-PT, OT, & SW consult  -DVT Prophylaxis: SCDs for now, if CTH negative then consider Lovenox 40mg subcu
- States that has noted relatively new onset of R thumb locking intermittently with associated pain and numbness of the thumb, none currently, able to flex and extend thumb well currently, no pain, no swelling, no numbness currently  - Suspect patient with likely trigger finger of the R thumb, would treat symptomatically as needed
-PT, OT, & SW consult  -DVT Prophylaxis: Lovenox 40mg subcu

## 2021-08-11 NOTE — DISCHARGE NOTE NURSING/CASE MANAGEMENT/SOCIAL WORK - NSDCFUADDAPPT_GEN_ALL_CORE_FT
Please follow up with your outpatient cardiologist or outpatient [facility] Cardiology clinic in 2 weeks.  Call [phone] for an appointment.

## 2021-08-11 NOTE — PROGRESS NOTE ADULT - ASSESSMENT
Patient is a 81 y/o F with PMHx of HTN, HLD, GERD, Ovarian Cancer s/p Bilateral Salpingo-oopherectomy hysterectomy , Left Masectomy , and memory impairment presenting with generalized weakness and dizziness after attending a close friend's  2021 AM. Admitted for Near syncope and EKG changes. 
Patient is a 79 y/o F with PMHx of HTN, HLD, GERD, Ovarian Cancer s/p Bilateral Salpingo-oopherectomy hysterectomy , Left Masectomy , and memory impairment presenting with generalized weakness and dizziness after attending a close friend's  2021 AM. Admitted for Near syncope and EKG changes.

## 2021-08-11 NOTE — PROGRESS NOTE ADULT - PROBLEM SELECTOR PLAN 1
- Patient with near syncope episode after attending close friend's . Episode lasted a maximum of 25 minutes, no LOC. No active signs of stroke  - CTH: w/o infarct/bleed  - TTE ordered, prior TTE from 2019 at [facility] showed severe LA dilation, mild LVH, grade 2 diastolic dysfunction, moderate RA enlargement, mild to moderate valvular disease  - Patient currently on telemetry: no events so far   - orthostatics mildly positive on admission, improved w/ IVF   - Encourage PO intake  [ ] TTE
- Patient with near syncope episode after attending close friend's . Episode lasted a maximum of 25 minutes, no LOC. No active signs of stroke  - CTH: w/o infarct/bleed  - TTE ordered, prior TTE from 2019 at [facility] showed severe LA dilation, mild LVH, grade 2 diastolic dysfunction, moderate RA enlargement, mild to moderate valvular disease  - Patient currently on telemetry: no events so far   - orthostatics mildly positive on admission, positive this AM.   [ ] 1L IVF today, re-check orthostatics   - Compression stockings   - Encourage PO intake  - TTE: mild-mod MR, grossly normal LV with mild diastolic dysfunction
- Patient with near syncope episode after attending close friend's . Episode lasted a maximum of 25 minutes, no LOC. No active signs of stroke  - CTH: w/o infarct/bleed  - TTE ordered, prior TTE from 2019 at [facility] showed severe LA dilation, mild LVH, grade 2 diastolic dysfunction, moderate RA enlargement, mild to moderate valvular disease  - Patient currently on telemetry: no events so far   - orthostatics mildly positive on admission, positive yesterday, improved w/ IVF  - Provided education- take time moving from laying down, sitting up to standing, use compression stockings, stay well hydrated   - Encourage PO intake  - TTE: mild-mod MR, grossly normal LV with mild diastolic dysfunction
- Patient with near syncope episode after attending close friend's . Episode lasted a maximum of 25 minutes. - No LOC. No active signs of stroke  - CTH ordered to r/o neurological causes of her symptoms w/o infarct/bleed  - restart ASA  - TTE ordered, prior TTE from 2019 at [facility] showed severe LA dilation, mild LVH, grade 2 diastolic dysfunction, moderate RA enlargement, mild to moderate valvular disease  - Patient currently on telemetry  - Will order maintenance fluids for hydration  - orthostatics mildly positive on admission  - Encourage PO intake
- Patient with near syncope episode after attending close friend's . Episode lasted a maximum of 25 minutes. - No LOC. No active signs of stroke  - CTH ordered to r/o neurological causes of her symptoms w/o infarct/bleed  - restart ASA  - TTE ordered, prior TTE from 2019 at [facility] showed severe LA dilation, mild LVH, grade 2 diastolic dysfunction, moderate RA enlargement, mild to moderate valvular disease  - Patient currently on telemetry  - Will order maintenance fluids for hydration  - orthostatics mildly positive  - Encourage PO intake

## 2021-08-11 NOTE — PROGRESS NOTE ADULT - PROBLEM SELECTOR PLAN 6
Transitions of Care Status:  1.  Name of PCP: Dr. Karo Montalvo  2.  PCP Contacted on Admission: [ ] Y    [ x] N    3.  PCP contacted at Discharge: [ ] Y    [ ] N    [ ] N/A  4.  Post-Discharge Appointment Date and Location:  5.  Summary of Handoff given to PCP:
Transitions of Care Status:  1.  Name of PCP: Dr. Karo Montalvo  2.  PCP Contacted on Admission: [ ] Y    [ x] N    3.  PCP contacted at Discharge: [ ] Y    [ ] N    [ ] N/A  4.  Post-Discharge Appointment Date and Location:  5.  Summary of Handoff given to PCP:    Julio Cesar Griffith updated 8/10
-PT, OT, & SW consult  -DVT Prophylaxis: SCDs for now, if CTH negative then consider Lovenox 40mg subcu
Transitions of Care Status:  1.  Name of PCP: Dr. Karo Montalvo  2.  PCP Contacted on Admission: [ ] Y    [ x] N    3.  PCP contacted at Discharge: [ ] Y    [ ] N    [ ] N/A  4.  Post-Discharge Appointment Date and Location:  5.  Summary of Handoff given to PCP:    Julio Cesar Griffith updated 8/10
Transitions of Care Status:  1.  Name of PCP: Dr. Karo Montalvo  2.  PCP Contacted on Admission: [ ] Y    [ x] N    3.  PCP contacted at Discharge: [ ] Y    [ ] N    [ ] N/A  4.  Post-Discharge Appointment Date and Location:  5.  Summary of Handoff given to PCP:    Julio Cesar Griffith updated 8/9

## 2021-08-11 NOTE — PROGRESS NOTE ADULT - PROBLEM SELECTOR PROBLEM 6
Discharge planning issues
Need for prophylactic measure

## 2021-08-11 NOTE — DISCHARGE NOTE NURSING/CASE MANAGEMENT/SOCIAL WORK - PATIENT PORTAL LINK FT
You can access the FollowMyHealth Patient Portal offered by [facility] by registering at the following website: [email or web address]. By joining [employer]’s FollowMyHealth portal, you will also be able to view your health information using other applications (apps) compatible with our system.

## 2021-08-11 NOTE — PROGRESS NOTE ADULT - PROBLEM SELECTOR PLAN 3
- Continue with home meds  - On losartan BID at home, currently would c/w daily dosing, increase as needed

## 2021-08-11 NOTE — PROGRESS NOTE ADULT - PROBLEM SELECTOR PROBLEM 5
Need for prophylactic measure
Need for prophylactic measure
Trigger finger of right thumb
Need for prophylactic measure
Need for prophylactic measure

## 2021-08-20 ENCOUNTER — NON-APPOINTMENT (OUTPATIENT)
Age: 81
End: 2021-08-20

## 2021-08-30 ENCOUNTER — APPOINTMENT (OUTPATIENT)
Dept: GYNECOLOGIC ONCOLOGY | Facility: CLINIC | Age: 81
End: 2021-08-30
Payer: MEDICARE

## 2021-08-30 VITALS
DIASTOLIC BLOOD PRESSURE: 79 MMHG | BODY MASS INDEX: 22.99 KG/M2 | SYSTOLIC BLOOD PRESSURE: 130 MMHG | HEIGHT: 65 IN | HEART RATE: 76 BPM | WEIGHT: 138 LBS

## 2021-08-30 PROCEDURE — 99213 OFFICE O/P EST LOW 20 MIN: CPT

## 2021-09-01 LAB — CANCER AG125 SERPL-ACNC: 8 U/ML

## 2021-10-28 ENCOUNTER — APPOINTMENT (OUTPATIENT)
Dept: SURGERY | Facility: CLINIC | Age: 81
End: 2021-10-28
Payer: MEDICARE

## 2021-10-28 VITALS — TEMPERATURE: 90.5 F

## 2021-10-28 VITALS
BODY MASS INDEX: 22.99 KG/M2 | SYSTOLIC BLOOD PRESSURE: 136 MMHG | HEIGHT: 65 IN | OXYGEN SATURATION: 98 % | WEIGHT: 138 LBS | HEART RATE: 73 BPM | DIASTOLIC BLOOD PRESSURE: 82 MMHG

## 2021-10-28 PROCEDURE — 99213 OFFICE O/P EST LOW 20 MIN: CPT

## 2021-10-28 NOTE — HISTORY OF PRESENT ILLNESS
[de-identified] : NIALL DISLA is a 80 year old female with  PMH of hypertension, hyperlipidemia, GERD, ovarian cancer in remission s/p hysterectomy and chemo in 1999 and chemotherapy in 2008, diagnosed with left breast cancer. Patient is s/p left breast total mastectomy with left breast axillary sentinel node biopsy 5/9/18. pathology results are consistent with Metastatic carcinoma involving 1 of 2 lymph nodes and invasive well-differentiated ductal carcinoma. Chemotherapy was discontinued due to the inability to tolerate the treatment.later she also had refused getting chemotherapy ????

## 2021-10-28 NOTE — CONSULT LETTER
[Dear  ___] : Dear  [unfilled], [Courtesy Letter:] : I had the pleasure of seeing your patient, [unfilled], in my office today. [Consult Closing:] : Thank you very much for allowing me to participate in the care of this patient.  If you have any questions, please do not hesitate to contact me. [Sincerely,] : Sincerely, [FreeTextEntry3] : Dr Cole

## 2021-10-28 NOTE — DATA REVIEWED
[FreeTextEntry1] : Exam requested by:\par ALIYA BARR MD\par [address] [facility]par [place] [address]\par SITE PERFORMED: [facility]\par SITE PHONE: [phone]\par Patient: NIALL DISLA\par YOB: 1940\par Phone: [phone] \par MRN: [id number] Acc: [id number]\par Date of Exam: 07-\par  \par EXAM:  MRI BRAIN WITHOUT CONTRAST\par \par HISTORY:  Amnesia \par \par TECHNIQUE:  MRI of the brain was performed utilizing multiplanar sequences.\par \par COMPARISON:  There are no prior studies available for comparison.\par \par FINDINGS: \par \par The ventricles and sulci are normal in size and configuration. There is no acute intracranial hemorrhage, extra-axial fluid collection, acute infarct or mass lesion. There is a chronic lacunar infarct involving the right thalamus. There are periventricular, subcortical and deep white matter foci of hyperintense FLAIR signal most consistent with mild microvascular ischemic disease.\par \par There is a focus of hypointense gradient echo signal involving the right frontal lobe, most consistent with a chronic microhemorrhage versus calcification.\par \par The arterial flow voids at the skull base are unremarkable. The pituitary gland is not enlarged. The cerebellar tonsils are normal in position.\par \par There is hyperostosis frontalis. There is a small polyp versus mucous retention cyst along the medial wall of the left maxillary sinus.\par \par IMPRESSION:\par \par No acute intracranial hemorrhage, extra-axial fluid collection, acute infarct or mass lesion. Chronic lacunar infarct involving the right thalamus. Mild microvascular ischemic disease.\par \par Thank you for the opportunity to participate in the care of this patient.  \par  \par TOMMY DAVEY MD  - Electronically Signed: 07- 3:17 PM \par Physician to Physician Direct Line is: [phone]\par

## 2021-11-08 ENCOUNTER — RESULT REVIEW (OUTPATIENT)
Age: 81
End: 2021-11-08

## 2021-11-08 ENCOUNTER — OUTPATIENT (OUTPATIENT)
Dept: OUTPATIENT SERVICES | Facility: HOSPITAL | Age: 81
LOS: 1 days | End: 2021-11-08
Payer: COMMERCIAL

## 2021-11-08 ENCOUNTER — APPOINTMENT (OUTPATIENT)
Dept: MAMMOGRAPHY | Facility: IMAGING CENTER | Age: 81
End: 2021-11-08
Payer: MEDICARE

## 2021-11-08 DIAGNOSIS — Z00.8 ENCOUNTER FOR OTHER GENERAL EXAMINATION: ICD-10-CM

## 2021-11-08 DIAGNOSIS — Z98.890 OTHER SPECIFIED POSTPROCEDURAL STATES: Chronic | ICD-10-CM

## 2021-11-08 DIAGNOSIS — Z90.12 ACQUIRED ABSENCE OF LEFT BREAST AND NIPPLE: Chronic | ICD-10-CM

## 2021-11-08 DIAGNOSIS — Z90.710 ACQUIRED ABSENCE OF BOTH CERVIX AND UTERUS: Chronic | ICD-10-CM

## 2021-11-08 DIAGNOSIS — Z98.89 OTHER SPECIFIED POSTPROCEDURAL STATES: Chronic | ICD-10-CM

## 2021-11-08 PROCEDURE — 77067 SCR MAMMO BI INCL CAD: CPT | Mod: 26,RT,52

## 2021-11-08 PROCEDURE — 77063 BREAST TOMOSYNTHESIS BI: CPT | Mod: 26,52

## 2021-11-08 PROCEDURE — 77063 BREAST TOMOSYNTHESIS BI: CPT

## 2021-11-08 PROCEDURE — 77067 SCR MAMMO BI INCL CAD: CPT

## 2021-12-17 NOTE — ED ADULT TRIAGE NOTE - SPO2 (%)
89M with HTN, BLE edema, HLD, hypothyroidism, anxiety, BPH who presents for SOB.  Found to have +coronavirus (not COVID).  On BiPAP with symptomatic improvement.      Acute hypoxic respiratory distress   - likely 2/2 secretions/mucus plugging from coronavirus infection  - admitted to [facility]  - currently sat'ing well on BiPAP - unable to wean to nasal canula  - pulm consult (Dr. Vann)   - wheezing - start trial of albuterol and steroids, if no improvement consider repeat xray in 24-48 hours.     Coronavirus infection  - supportive care at this time  - no signs of bacterial infection requiring abx.  monitor cultures.    HTN/HLD  - cont with lisinopril and metoprolol with hold parameters if able to take PO  - cont with atorvastatin    GERD  - IV protonix for now    Hypothyroidism  - cont with levothyroxine    Preventive measures  - lovenox for DVT ppx    Daughter consented to DNR/DNI  prognosis guarded.   89M with HTN, BLE edema, HLD, hypothyroidism, anxiety, BPH who presents for SOB.  Found to have + seasonal coronavirus (not COVID).  On BiPAP with symptomatic improvement.      Acute hypoxic respiratory failure due to viral pneumonia   - likely 2/2 secretions/mucus plugging from coronavirus infection  - admitted to [facility]  - currently sat'ing well on BiPAP - unable to wean to nasal canula  - pulm consult (Dr. Vann)   - wheezing - start trial of albuterol and steroids, if no improvement consider repeat xray in 24-48 hours.     Coronavirus infection  - supportive care at this time  - no signs of bacterial infection requiring abx.  monitor cultures.    HTN/HLD  - cont with lisinopril and metoprolol with hold parameters if able to take PO  - cont with atorvastatin    GERD  - IV protonix for now    Hypothyroidism  - cont with levothyroxine    Preventive measures  - lovenox for DVT ppx    Daughter consented to DNR/DNI  prognosis guarded.   97

## 2022-03-04 ENCOUNTER — APPOINTMENT (OUTPATIENT)
Dept: GYNECOLOGIC ONCOLOGY | Facility: CLINIC | Age: 82
End: 2022-03-04
Payer: MEDICARE

## 2022-03-04 VITALS
HEART RATE: 81 BPM | SYSTOLIC BLOOD PRESSURE: 154 MMHG | HEIGHT: 65 IN | WEIGHT: 132 LBS | DIASTOLIC BLOOD PRESSURE: 84 MMHG | BODY MASS INDEX: 21.99 KG/M2

## 2022-03-04 PROCEDURE — 99213 OFFICE O/P EST LOW 20 MIN: CPT

## 2022-03-05 ENCOUNTER — TRANSCRIPTION ENCOUNTER (OUTPATIENT)
Age: 82
End: 2022-03-05

## 2022-04-04 ENCOUNTER — APPOINTMENT (OUTPATIENT)
Dept: SURGERY | Facility: CLINIC | Age: 82
End: 2022-04-04
Payer: MEDICARE

## 2022-04-04 VITALS
SYSTOLIC BLOOD PRESSURE: 142 MMHG | HEIGHT: 65 IN | WEIGHT: 132 LBS | DIASTOLIC BLOOD PRESSURE: 79 MMHG | HEART RATE: 93 BPM | BODY MASS INDEX: 21.99 KG/M2

## 2022-04-04 VITALS — TEMPERATURE: 97 F

## 2022-04-04 PROCEDURE — 99213 OFFICE O/P EST LOW 20 MIN: CPT

## 2022-04-04 NOTE — HISTORY OF PRESENT ILLNESS
[de-identified] : 80 y/o female with PMH of hypertension, hyperlipidemia, GERD, ovarian cancer in remission s/p hysterectomy and chemo in 1999 and chemotherapy in 2008, diagnosed with left breast cancer. Patient is s/p left breast total mastectomy with left breast axillary sentinel node biopsy 5/9/18. pathology results are consistent with Metastatic carcinoma involving 1 of 2 lymph nodes and invasive well-differentiated ductal carcinoma. Chemotherapy was discontinued due to the inability to tolerate the treatment.later she also had refused getting chemotherapy. \par Patient's breast mammo was done 11/08/21 without evidence of suspicious findings, BI RADS cat 2.  [de-identified] : Patient reports no interval changes to her overall health status or medical history.\par

## 2022-04-04 NOTE — ASU PREOP CHECKLIST - CHLOROHEXIDINE WASH 1
28-Mar-2018 FAMILY HISTORY:  Grandparent  Still living? Unknown  FH: thyroid disease, Age at diagnosis: Age Unknown

## 2022-04-04 NOTE — PLAN
[FreeTextEntry1] : continue with SBE and annual imaging \par RTO for follow up visit in 6 months\par \par Patient's questions and concerns addressed to their satisfaction, and patient verbalized an understanding of the information discussed.\par

## 2022-04-04 NOTE — PHYSICAL EXAM
[Normal Breath Sounds] : Normal breath sounds [Normal Rate and Rhythm] : normal rate and rhythm [No Rash or Lesion] : No rash or lesion [Alert] : alert [Oriented to Person] : oriented to person [Oriented to Place] : oriented to place [Oriented to Time] : oriented to time [Calm] : calm [JVD] : no jugular venous distention  [de-identified] : A/Ox3; NAD. appears comfortable [de-identified] : EOMI; sclera anicteric. Nasal mucosa pink, septum midline. Oral mucosa pink. Tongue midline, Pharynx without exudates. [de-identified] : well healed scar to left side, at mastectomy site ; no recurrence; no palpable masses to right breast, no axillary lymphadenopathy to either side  [de-identified] : abd is soft, NT/ND\par  [de-identified] : +ROM, no joint swelling

## 2022-04-04 NOTE — ASSESSMENT
[FreeTextEntry1] : 80 y/o female with PMH of hypertension, hyperlipidemia, GERD, ovarian cancer in remission s/p hysterectomy and chemo in 1999 and chemotherapy in 2008, diagnosed with left breast cancer. Patient is s/p left breast total mastectomy with left breast axillary sentinel node biopsy 5/9/18. pathology results are consistent with Metastatic carcinoma involving 1 of 2 lymph nodes and invasive well-differentiated ductal carcinoma. \par Clinically, negative breast exam.

## 2022-04-28 ENCOUNTER — APPOINTMENT (OUTPATIENT)
Dept: SURGERY | Facility: CLINIC | Age: 82
End: 2022-04-28

## 2022-09-19 ENCOUNTER — NON-APPOINTMENT (OUTPATIENT)
Age: 82
End: 2022-09-19

## 2022-09-19 ENCOUNTER — APPOINTMENT (OUTPATIENT)
Dept: GYNECOLOGIC ONCOLOGY | Facility: CLINIC | Age: 82
End: 2022-09-19

## 2022-09-19 VITALS
BODY MASS INDEX: 20.83 KG/M2 | HEART RATE: 85 BPM | WEIGHT: 125 LBS | DIASTOLIC BLOOD PRESSURE: 82 MMHG | SYSTOLIC BLOOD PRESSURE: 146 MMHG | HEIGHT: 65 IN

## 2022-09-19 PROCEDURE — 99213 OFFICE O/P EST LOW 20 MIN: CPT

## 2022-09-20 LAB — CANCER AG125 SERPL-ACNC: 7 U/ML

## 2022-10-06 ENCOUNTER — APPOINTMENT (OUTPATIENT)
Dept: SURGERY | Facility: CLINIC | Age: 82
End: 2022-10-06

## 2022-10-06 VITALS
WEIGHT: 125 LBS | HEART RATE: 83 BPM | HEIGHT: 65 IN | SYSTOLIC BLOOD PRESSURE: 152 MMHG | BODY MASS INDEX: 20.83 KG/M2 | DIASTOLIC BLOOD PRESSURE: 90 MMHG

## 2022-10-06 VITALS — TEMPERATURE: 96.1 F

## 2022-10-06 PROCEDURE — 99213 OFFICE O/P EST LOW 20 MIN: CPT

## 2022-10-06 NOTE — PHYSICAL EXAM
[Normal Breath Sounds] : Normal breath sounds [Normal Rate and Rhythm] : normal rate and rhythm [No Rash or Lesion] : No rash or lesion [Alert] : alert [Oriented to Person] : oriented to person [Oriented to Place] : oriented to place [Oriented to Time] : oriented to time [Calm] : calm [JVD] : no jugular venous distention  [de-identified] : A/Ox3; NAD. appears comfortable [de-identified] : EOMI; sclera anicteric. Nasal mucosa pink, septum midline. Oral mucosa pink. Tongue midline, Pharynx without exudates. [de-identified] : well healed scar to left side, at mastectomy site ; no recurrence felt; no palpable masses to right breast, no axillary lymphadenopathy to either side  [de-identified] : abd is soft, NT/ND\par  [de-identified] : +ROM, no joint swelling

## 2022-10-06 NOTE — HISTORY OF PRESENT ILLNESS
[de-identified] : 80 y/o female with PMH of hypertension, hyperlipidemia, GERD, ovarian cancer in remission s/p hysterectomy and chemo in 1999 and chemotherapy in 2008, diagnosed with left breast cancer. Patient is s/p left breast total mastectomy with left breast axillary sentinel node biopsy 5/9/18. pathology results are consistent with Metastatic carcinoma involving 1 of 2 lymph nodes and invasive well-differentiated ductal carcinoma. Chemotherapy was discontinued due to the inability to tolerate the treatment.later she also had refused getting chemotherapy. \par She presents to the office today for 6 mo f/u visit. She admits to some weight loss, more recently. She had a CT scan done 08/22/22. She is on Anastrazole. \par

## 2022-10-06 NOTE — PLAN
[FreeTextEntry1] : continue care as per oncology \par RTO for follow up visit in 6 months/sooner if there are any concerns \par continue with SBE and annual screening /R Breast mammo \par \par Patient's questions and concerns addressed to their satisfaction, and patient verbalized an understanding of the information discussed.\par

## 2022-10-06 NOTE — REASON FOR VISIT
[Follow-Up: _____] : a [unfilled] follow-up visit [FreeTextEntry1] : s/p left breast total mastectomy with left breast axillary sentinel node biopsy 5/9/18

## 2022-10-06 NOTE — REVIEW OF SYSTEMS
[Recent Weight Loss (___ Lbs)] : recent [unfilled] ~Ulb weight loss [Arthralgias] : arthralgias [Fever] : no fever [Chills] : no chills [Feeling Poorly] : not feeling poorly [Chest Pain] : no chest pain [Lower Ext Edema] : no lower extremity edema [Shortness Of Breath] : no shortness of breath [Cough] : no cough [Pelvic Pain] : no pelvic pain [Skin Lesions] : no skin lesions [Skin Wound] : no skin wound [Breast Pain] : no breast pain [Breast Lump] : no breast lump [Dizziness] : no dizziness [Anxiety] : no anxiety [Swollen Glands] : no swollen glands

## 2022-10-06 NOTE — ASSESSMENT
[FreeTextEntry1] : Ms. DISLA is a 81 year y/o F,  s/p left breast total mastectomy with left breast axillary sentinel node biopsy 5/9/18. Patient follows up with GYN oncologist, for stage III Ovarian CA; Dr. Pinto. Most recent imaging from 08/22 reviewed. Patient reports some recent weight loss. No masses or lumps felt to right breast/no recurrence to the left side. Clinically, negative breast exam.

## 2022-11-29 NOTE — H&P PST ADULT - EXPECTED LOS
[facility])     OLD RIGHT CEREBELLAR INFARCT INFEROLATERALLY seen on MRI 12/2018    Cerebral artery occlusion with cerebral infarction ([facility] [address]     COVID 09/30/German Prajapati MD at [facility], 1 day

## 2022-11-30 ENCOUNTER — OUTPATIENT (OUTPATIENT)
Dept: OUTPATIENT SERVICES | Facility: HOSPITAL | Age: 82
LOS: 1 days | End: 2022-11-30
Payer: COMMERCIAL

## 2022-11-30 ENCOUNTER — APPOINTMENT (OUTPATIENT)
Dept: MAMMOGRAPHY | Facility: IMAGING CENTER | Age: 82
End: 2022-11-30
Payer: MEDICARE

## 2022-11-30 ENCOUNTER — APPOINTMENT (OUTPATIENT)
Dept: ULTRASOUND IMAGING | Facility: IMAGING CENTER | Age: 82
End: 2022-11-30
Payer: MEDICARE

## 2022-11-30 DIAGNOSIS — Z98.89 OTHER SPECIFIED POSTPROCEDURAL STATES: Chronic | ICD-10-CM

## 2022-11-30 DIAGNOSIS — Z90.710 ACQUIRED ABSENCE OF BOTH CERVIX AND UTERUS: Chronic | ICD-10-CM

## 2022-11-30 DIAGNOSIS — Z98.890 OTHER SPECIFIED POSTPROCEDURAL STATES: Chronic | ICD-10-CM

## 2022-11-30 DIAGNOSIS — Z00.8 ENCOUNTER FOR OTHER GENERAL EXAMINATION: ICD-10-CM

## 2022-11-30 DIAGNOSIS — Z90.12 ACQUIRED ABSENCE OF LEFT BREAST AND NIPPLE: Chronic | ICD-10-CM

## 2022-11-30 PROCEDURE — 77066 DX MAMMO INCL CAD BI: CPT | Mod: 26

## 2022-11-30 PROCEDURE — G0279: CPT

## 2022-11-30 PROCEDURE — G0279: CPT | Mod: 26

## 2022-11-30 PROCEDURE — 77066 DX MAMMO INCL CAD BI: CPT | Mod: 26,RT

## 2022-11-30 PROCEDURE — 77065 DX MAMMO INCL CAD UNI: CPT | Mod: 26,RT

## 2022-11-30 PROCEDURE — 77065 DX MAMMO INCL CAD UNI: CPT

## 2023-03-17 ENCOUNTER — NON-APPOINTMENT (OUTPATIENT)
Age: 83
End: 2023-03-17

## 2023-03-20 ENCOUNTER — APPOINTMENT (OUTPATIENT)
Dept: GYNECOLOGIC ONCOLOGY | Facility: CLINIC | Age: 83
End: 2023-03-20

## 2023-03-20 DIAGNOSIS — M85.80 OTHER SPECIFIED DISORDERS OF BONE DENSITY AND STRUCTURE, UNSPECIFIED SITE: ICD-10-CM

## 2023-04-06 ENCOUNTER — APPOINTMENT (OUTPATIENT)
Dept: SURGERY | Facility: CLINIC | Age: 83
End: 2023-04-06
Payer: MEDICARE

## 2023-04-06 VITALS
HEART RATE: 73 BPM | BODY MASS INDEX: 20.49 KG/M2 | SYSTOLIC BLOOD PRESSURE: 136 MMHG | HEIGHT: 65 IN | DIASTOLIC BLOOD PRESSURE: 80 MMHG | WEIGHT: 123 LBS

## 2023-04-06 DIAGNOSIS — C50.912 MALIGNANT NEOPLASM OF UNSPECIFIED SITE OF LEFT FEMALE BREAST: ICD-10-CM

## 2023-04-06 PROCEDURE — 99213 OFFICE O/P EST LOW 20 MIN: CPT

## 2023-04-06 NOTE — PHYSICAL EXAM
[Normal Breath Sounds] : Normal breath sounds [Normal Rate and Rhythm] : normal rate and rhythm [No Rash or Lesion] : No rash or lesion [Alert] : alert [Oriented to Person] : oriented to person [Oriented to Place] : oriented to place [Oriented to Time] : oriented to time [Calm] : calm [JVD] : no jugular venous distention  [de-identified] : A/Ox3; NAD. appears comfortable [de-identified] : well healed scar to left side, at mastectomy site ; no recurrence; no palpable masses to right breast, no axillary lymphadenopathy to either side  [de-identified] : EOMI; sclera anicteric. Nasal mucosa pink, septum midline. Oral mucosa pink. Tongue midline, Pharynx without exudates. [de-identified] : abd is soft, NT/ND\par  [de-identified] : +ROM, no joint swelling

## 2023-04-06 NOTE — HISTORY OF PRESENT ILLNESS
[de-identified] : 81 y/o female with PMH of hypertension, hyperlipidemia, GERD, ovarian cancer in remission s/p hysterectomy and chemo in 1999 and chemotherapy in 2008, diagnosed with left breast cancer. Patient is s/p left breast total mastectomy with left breast axillary sentinel node biopsy 5/9/18. pathology results are consistent with Metastatic carcinoma involving 1 of 2 lymph nodes and invasive well-differentiated ductal carcinoma. Chemotherapy was discontinued due to the inability to tolerate the treatment.later she also had refused getting chemotherapy. \par Ms TIERNEY presents to the office today for 6 mo f/u visit. She is on PO Anastrazole. \par Today she is without reported complaints. Patient states she is feeling well.  [de-identified] : Patient reports no interval changes to her overall health status or medical history.\par

## 2023-04-06 NOTE — REASON FOR VISIT
[Follow-Up: _____] : a [unfilled] follow-up visit [FreeTextEntry1] : S/P  left breast total mastectomy with left breast axillary sentinel node biopsy 5/9/18.

## 2023-04-06 NOTE — PLAN
[FreeTextEntry1] : patient to have a follow up /annual R breast mammo \par 6 month follow up \par \par Patient's questions and concerns addressed to their satisfaction, and patient verbalized an understanding of the information discussed.\par

## 2023-09-19 ENCOUNTER — EMERGENCY (EMERGENCY)
Facility: HOSPITAL | Age: 83
LOS: 1 days | Discharge: HOME | End: 2023-09-19
Attending: EMERGENCY MEDICINE
Payer: COMMERCIAL

## 2023-09-19 VITALS
WEIGHT: 126.1 LBS | HEIGHT: 65 IN | OXYGEN SATURATION: 97 % | TEMPERATURE: 98 F | SYSTOLIC BLOOD PRESSURE: 161 MMHG | HEART RATE: 76 BPM | RESPIRATION RATE: 18 BRPM | DIASTOLIC BLOOD PRESSURE: 97 MMHG

## 2023-09-19 VITALS
OXYGEN SATURATION: 98 % | HEART RATE: 79 BPM | RESPIRATION RATE: 189 BRPM | DIASTOLIC BLOOD PRESSURE: 78 MMHG | SYSTOLIC BLOOD PRESSURE: 155 MMHG | TEMPERATURE: 98 F

## 2023-09-19 DIAGNOSIS — Z98.890 OTHER SPECIFIED POSTPROCEDURAL STATES: Chronic | ICD-10-CM

## 2023-09-19 DIAGNOSIS — Z98.89 OTHER SPECIFIED POSTPROCEDURAL STATES: Chronic | ICD-10-CM

## 2023-09-19 DIAGNOSIS — Z90.12 ACQUIRED ABSENCE OF LEFT BREAST AND NIPPLE: Chronic | ICD-10-CM

## 2023-09-19 DIAGNOSIS — Z90.710 ACQUIRED ABSENCE OF BOTH CERVIX AND UTERUS: Chronic | ICD-10-CM

## 2023-09-19 LAB
ALBUMIN SERPL ELPH-MCNC: 3.8 G/DL — SIGNIFICANT CHANGE UP (ref 3.5–5)
ALP SERPL-CCNC: 69 U/L — SIGNIFICANT CHANGE UP (ref 40–120)
ALT FLD-CCNC: 26 U/L DA — SIGNIFICANT CHANGE UP (ref 10–60)
ANION GAP SERPL CALC-SCNC: 7 MMOL/L — SIGNIFICANT CHANGE UP (ref 5–17)
AST SERPL-CCNC: 30 U/L — SIGNIFICANT CHANGE UP (ref 10–40)
BASOPHILS # BLD AUTO: 0.04 K/UL — SIGNIFICANT CHANGE UP (ref 0–0.2)
BASOPHILS NFR BLD AUTO: 0.8 % — SIGNIFICANT CHANGE UP (ref 0–2)
BILIRUB SERPL-MCNC: 0.5 MG/DL — SIGNIFICANT CHANGE UP (ref 0.2–1.2)
BUN SERPL-MCNC: 17 MG/DL — SIGNIFICANT CHANGE UP (ref 7–18)
CALCIUM SERPL-MCNC: 9.2 MG/DL — SIGNIFICANT CHANGE UP (ref 8.4–10.5)
CHLORIDE SERPL-SCNC: 104 MMOL/L — SIGNIFICANT CHANGE UP (ref 96–108)
CK SERPL-CCNC: 131 U/L — SIGNIFICANT CHANGE UP (ref 21–215)
CO2 SERPL-SCNC: 30 MMOL/L — SIGNIFICANT CHANGE UP (ref 22–31)
CREAT SERPL-MCNC: 0.9 MG/DL — SIGNIFICANT CHANGE UP (ref 0.5–1.3)
EGFR: 63 ML/MIN/1.73M2 — SIGNIFICANT CHANGE UP
EOSINOPHIL # BLD AUTO: 0.15 K/UL — SIGNIFICANT CHANGE UP (ref 0–0.5)
EOSINOPHIL NFR BLD AUTO: 2.8 % — SIGNIFICANT CHANGE UP (ref 0–6)
ERYTHROCYTE [SEDIMENTATION RATE] IN BLOOD: 16 MM/HR — SIGNIFICANT CHANGE UP (ref 0–20)
GLUCOSE SERPL-MCNC: 87 MG/DL — SIGNIFICANT CHANGE UP (ref 70–99)
HCT VFR BLD CALC: 40.9 % — SIGNIFICANT CHANGE UP (ref 34.5–45)
HGB BLD-MCNC: 13.3 G/DL — SIGNIFICANT CHANGE UP (ref 11.5–15.5)
IMM GRANULOCYTES NFR BLD AUTO: 0.2 % — SIGNIFICANT CHANGE UP (ref 0–0.9)
LYMPHOCYTES # BLD AUTO: 1.78 K/UL — SIGNIFICANT CHANGE UP (ref 1–3.3)
LYMPHOCYTES # BLD AUTO: 33.5 % — SIGNIFICANT CHANGE UP (ref 13–44)
MAGNESIUM SERPL-MCNC: 2 MG/DL — SIGNIFICANT CHANGE UP (ref 1.6–2.6)
MCHC RBC-ENTMCNC: 30.9 PG — SIGNIFICANT CHANGE UP (ref 27–34)
MCHC RBC-ENTMCNC: 32.5 GM/DL — SIGNIFICANT CHANGE UP (ref 32–36)
MCV RBC AUTO: 94.9 FL — SIGNIFICANT CHANGE UP (ref 80–100)
MONOCYTES # BLD AUTO: 0.63 K/UL — SIGNIFICANT CHANGE UP (ref 0–0.9)
MONOCYTES NFR BLD AUTO: 11.9 % — SIGNIFICANT CHANGE UP (ref 2–14)
NEUTROPHILS # BLD AUTO: 2.7 K/UL — SIGNIFICANT CHANGE UP (ref 1.8–7.4)
NEUTROPHILS NFR BLD AUTO: 50.8 % — SIGNIFICANT CHANGE UP (ref 43–77)
NRBC # BLD: 0 /100 WBCS — SIGNIFICANT CHANGE UP (ref 0–0)
PLATELET # BLD AUTO: 175 K/UL — SIGNIFICANT CHANGE UP (ref 150–400)
POTASSIUM SERPL-MCNC: 5.3 MMOL/L — SIGNIFICANT CHANGE UP (ref 3.5–5.3)
POTASSIUM SERPL-SCNC: 5.3 MMOL/L — SIGNIFICANT CHANGE UP (ref 3.5–5.3)
PROT SERPL-MCNC: 8.1 G/DL — SIGNIFICANT CHANGE UP (ref 6–8.3)
RBC # BLD: 4.31 M/UL — SIGNIFICANT CHANGE UP (ref 3.8–5.2)
RBC # FLD: 13.4 % — SIGNIFICANT CHANGE UP (ref 10.3–14.5)
SODIUM SERPL-SCNC: 141 MMOL/L — SIGNIFICANT CHANGE UP (ref 135–145)
TROPONIN I, HIGH SENSITIVITY RESULT: 7.7 NG/L — SIGNIFICANT CHANGE UP
WBC # BLD: 5.31 K/UL — SIGNIFICANT CHANGE UP (ref 3.8–10.5)
WBC # FLD AUTO: 5.31 K/UL — SIGNIFICANT CHANGE UP (ref 3.8–10.5)

## 2023-09-19 PROCEDURE — 96374 THER/PROPH/DIAG INJ IV PUSH: CPT

## 2023-09-19 PROCEDURE — 80053 COMPREHEN METABOLIC PANEL: CPT

## 2023-09-19 PROCEDURE — 82550 ASSAY OF CK (CPK): CPT

## 2023-09-19 PROCEDURE — 99285 EMERGENCY DEPT VISIT HI MDM: CPT | Mod: 25

## 2023-09-19 PROCEDURE — 99285 EMERGENCY DEPT VISIT HI MDM: CPT

## 2023-09-19 PROCEDURE — 85652 RBC SED RATE AUTOMATED: CPT

## 2023-09-19 PROCEDURE — 83735 ASSAY OF MAGNESIUM: CPT

## 2023-09-19 PROCEDURE — 84484 ASSAY OF TROPONIN QUANT: CPT

## 2023-09-19 PROCEDURE — 93005 ELECTROCARDIOGRAM TRACING: CPT

## 2023-09-19 PROCEDURE — 85025 COMPLETE CBC W/AUTO DIFF WBC: CPT

## 2023-09-19 PROCEDURE — 36415 COLL VENOUS BLD VENIPUNCTURE: CPT

## 2023-09-19 PROCEDURE — 73030 X-RAY EXAM OF SHOULDER: CPT

## 2023-09-19 PROCEDURE — 73030 X-RAY EXAM OF SHOULDER: CPT | Mod: 26,RT

## 2023-09-19 RX ORDER — ACETAMINOPHEN 500 MG
1000 TABLET ORAL ONCE
Refills: 0 | Status: COMPLETED | OUTPATIENT
Start: 2023-09-19 | End: 2023-09-19

## 2023-09-19 RX ADMIN — Medication 1000 MILLIGRAM(S): at 18:16

## 2023-09-19 RX ADMIN — Medication 400 MILLIGRAM(S): at 18:01

## 2023-09-19 NOTE — ED PROVIDER NOTE - PATIENT PORTAL LINK FT
You can access the FollowMyHealth Patient Portal offered by [facility] by registering at the following website: [email or web address]. By joining [facility]’s FollowMyHealth portal, you will also be able to view your health information using other applications (apps) compatible with our system.

## 2023-09-19 NOTE — ED PROVIDER NOTE - EKG ADDITIONAL QUESTION - PERFORMED INDEPENDENT VISUALIZATION
From: Elsa Weiss  To: Calderon Bowen MD  Sent: 3/29/2017 10:16 AM EDT  Subject: Non-Urgent Medical Question    Good morning Dr Dallas Lozada,     My left nostril (I can usually see a bulge of something redish in my nose when it's stuffy) has been blocked for a long time (sometimes it switches from side to side) I've tried every allergy medicine pill and spray and nothing seems to work. Sometimes pseudophedrine (behind the counter, have to show ID) will clear it up a little but not for long. Is there anything else I can take? I'm almost 100% sure it's not allergies (I've taken zytek, clairatin, and flonase).
Yes

## 2023-09-19 NOTE — ED PROVIDER NOTE - PROGRESS NOTE DETAILS
Labs/xray/EKG explained to pt & daughter  pt's feeling better, will repeat EKG, if no changes, will d/c home.  advised to f/u with orthopedics.  Pt with poss bursitis

## 2023-09-19 NOTE — ED PROVIDER NOTE - OBJECTIVE STATEMENT
82-year-old female with history of HTN, hyperlipidemia, GERD, left breast CA status post mastectomy 2008, ovarian CA 1999, complaining of on and off right shoulder pain for past 2 days.  Patient denies injuries, fever, numbness to her fingers.  Patient states she fell 2 weeks ago, denies any injuries.  Patient took 1 tab of Aleve with some relief

## 2023-09-19 NOTE — ED PROVIDER NOTE - NSFOLLOWUPINSTRUCTIONS_ED_ALL_ED_FT
Shoulder Pain  Many things can cause shoulder pain, including:  An injury to the shoulder.  Overuse of the shoulder.  Arthritis.  The source of the pain can be:  Inflammation.  An injury to the shoulder joint.  An injury to a tendon, ligament, or bone.  Follow these instructions at home:  Pay attention to changes in your symptoms. Let your health care provider know about them. Follow these instructions to relieve your pain.    If you have a sling:    Wear the sling as told by your health care provider. Remove it only as told by your health care provider.  Loosen the sling if your fingers tingle, become numb, or turn cold and blue.  Keep the sling clean.  If the sling is not waterproof:  Do not let it get wet. Remove it to shower or bathe.  Move your arm as little as possible, but keep your hand moving to prevent swelling.  Managing pain, stiffness, and swelling    Bag of ice on a towel on the skin.  If directed, put ice on the painful area:  Put ice in a plastic bag.  Place a towel between your skin and the bag.  Leave the ice on for 20 minutes, 2–3 times per day. Stop applying ice if it does not help with the pain.  Squeeze a soft ball or a foam pad as much as possible. This helps to keep the shoulder from swelling. It also helps to strengthen the arm.  General instructions    Take over-the-counter and prescription medicines only as told by your health care provider.  Keep all follow-up visits as told by your health care provider. This is important.  Contact a health care provider if:  Your pain gets worse.  Your pain is not relieved with medicines.  New pain develops in your arm, hand, or fingers.  Get help right away if:  Your arm, hand, or fingers:  Tingle.  Become numb.  Become swollen.  Become painful.  Turn white or blue.  Summary  Shoulder pain can be caused by an injury, overuse, or arthritis.  Pay attention to changes in your symptoms. Let your health care provider know about them.  This condition may be treated with a sling, ice, and pain medicines.  Contact your health care provider if the pain gets worse or new pain develops. Get help right away if your arm, hand, or fingers tingle or become numb, swollen, or painful.  Keep all follow-up visits as told by your health care provider. This is important.  This information is not intended to replace advice given to you by your health care provider. Make sure you discuss any questions you have with your health care provider.      Bursitis  Front, or anterior, view of the knee with a close-up of an inflamed bursa.  Bursitis is inflammation and irritation of a bursa, which is a small fluid-filled sac that cushions and protects the joints. These sacs are located between bones and muscles, bones and muscle attachments, or bones and skin areas that are next to bones. A bursa protects those structures from the wear and tear that results from frequent movement. If the bursa becomes irritated, it can fill with extra fluid. Bursitis is most common near joints, especially the knees, elbows, hips, and shoulders.    What are the causes?  This condition may be caused by:  An injury like a direct hit to a joint area, such as falling on your knee or elbow.  Overuse of a joint (repetitive stress).  Infection. This can happen if bacteria get into a bursa through a cut or scrape near a joint.  Diseases that cause joint inflammation, such as gout and rheumatoid arthritis.  What increases the risk?  You are more likely to develop this condition if:  You have a job or hobby that involves a lot of repetitive stress on your joints.  You have a condition that weakens your body's defense system (immune system), such as diabetes, cancer, or HIV.  You do any of these often:  Lift and reach overhead.  Kneel or lean on hard surfaces.  Participate in physical activities that include repetitive motion, like running or walking.  What are the signs or symptoms?  Common symptoms of this condition include:  Pain that gets worse when you move the affected body part or use it to support your body weight.  Inflammation.  Stiffness.  Other symptoms include:  Redness.  Swelling.  Tenderness.  Warmth.  Pain that continues after rest.  Fever or chills. These may occur in bursitis that is caused by infection.  How is this diagnosed?  This condition may be diagnosed based on:  Your medical history and a physical exam.  Imaging tests, such as an MRI or X-ray.  Draining fluid from the bursa to test it for infection or gout.  Blood tests to rule out other causes of inflammation.  How is this treated?  This condition can usually be treated at home with rest, ice, applying pressure (compression), and raising the body part that is affected (elevation). This is called RICE therapy. For mild bursitis, RICE therapy may be all you need. Other treatments may include:  Over-the-counter medicines to relieve pain and inflammation.  Injections of anti-inflammatory medicines. These medicines may be injected into and around the area of bursitis.  Draining of fluid from the bursa to relieve pain and improve movement.  Antibiotic medicine if there is an infection.  Using a splint, brace, elastic wrap, pads, or walking aid, such as a cane.  Physical or occupational therapy if you continue to have pain or limited movement. Your physical or occupational therapist can help determine what may have caused or contributed to the bursitis. This will help avoid further episodes.  Surgery to remove a damaged or infected bursa. This may be needed if other treatments have not worked.  Follow these instructions at home:  Medicines    Take over-the-counter and prescription medicines only as told by your health care provider.  If you were prescribed an antibiotic medicine, take it as told by your health care provider. Do not stop using the antibiotic even if you start to feel better.  Managing pain, stiffness, and swelling    Bag of ice on a towel on the skin.  A heating pad being used on the affected joint.  Raise (elevate) the injured area above the level of your heart while you are sitting or lying down.  If directed, put ice on the affected area. To do this:  Put ice in a plastic bag.  Place a towel between your skin and the bag, or between your splint or brace and the bag.  Leave the ice on for 20 minutes, 2–3 times a day.  Remove the ice if your skin turns bright red. This is very important. If you cannot feel pain, heat, or cold, you have a greater risk of damage to the area.  If directed, apply heat to the affected area as often as told by your health care provider. Use the heat source that your health care provider recommends, such as a moist heat pack or a heating pad.  Place a towel between your skin and the heat source.  Leave the heat on for 20–30 minutes.  Remove the heat if your skin turns bright red. This is especially important if you are unable to feel pain, heat, or cold. You may have a greater risk of getting burned.  General instructions    Rest the affected area as told by your health care provider.  Avoid activities that make pain worse.  Use splints, braces, pads, elastic wrap, or walking aids as told by your health care provider.  Keep all follow-up visits. This is important.  Preventing future episodes    Wear knee pads if you kneel often.  Wear sturdy running or walking shoes that fit well.  Take breaks regularly from repetitive activity.  Warm up by stretching before doing any activity that takes a lot of effort.  Maintain a healthy weight or lose weight as recommended by your health care provider. If you need help doing this, ask your health care provider.  Exercise regularly. Start any new physical activity gradually.  Work with your physical or occupational therapist and your health care provider to help determine what caused the bursitis.  Contact a health care provider if:  You have a fever or chills.  Your symptoms do not get better with treatment.  You have pain or swelling that gets worse, or it goes away and then comes back.  You have pus draining from the affected area.  You have redness around the affected area.  The affected area is warm to the touch.  Summary  Bursitis is inflammation and irritation of a bursa, which is a small fluid-filled sac that cushions and protects the joints.  Rest the affected area as told by your health care provider.  Avoid activities that make pain worse.  This condition can usually be treated at home with rest, ice, applying pressure (compression), and raising the body part that is affected (elevation). This is called RICE therapy.  This information is not intended to replace advice given to you by your health care provider. Make sure you discuss any questions you have with your health care provider.    take tylenol extrastrength 2 tabs 3 times a day as needed  Take Aleve 2 tabs 1 time a day as needed with food  ice to your shoulder  follow up with orthopedics

## 2023-09-19 NOTE — ED PROVIDER NOTE - MUSCULOSKELETAL, MLM
Spine appears normal, range of motion is not limited, Rt shoulder/upper back tenderness to palp., no swelling, erythema, pulses/sensory intact, FROM

## 2023-09-19 NOTE — ED ADULT NURSE NOTE - NSFALLRISKINTERV_ED_ALL_ED

## 2023-09-19 NOTE — ED PROVIDER NOTE - CLINICAL SUMMARY MEDICAL DECISION MAKING FREE TEXT BOX
Patient with right shoulder pain, will get x-ray rule out fracture which is unlikely.  Unfortunately EKG was done prior to seeing patient, EKG is abnormal, will have to get labs, troponin, will give IV tylenol reassess

## 2023-09-23 NOTE — H&P PST ADULT - RESPIRATORY AND THORAX
Heme/Onc Heme/Onc Heme/Onc Cardiology-Oncology Heme/Onc Hospitalist Internal Medicine Internal Medicine Internal Medicine Internal Medicine Internal Medicine Internal Medicine details…

## 2023-09-29 RX ORDER — AMLODIPINE BESYLATE 2.5 MG/1
1 TABLET ORAL
Qty: 0 | Refills: 0 | DISCHARGE

## 2023-09-29 RX ORDER — LOSARTAN POTASSIUM 100 MG/1
1 TABLET, FILM COATED ORAL
Qty: 0 | Refills: 0 | DISCHARGE

## 2023-09-29 RX ORDER — SIMVASTATIN 20 MG/1
1 TABLET, FILM COATED ORAL
Qty: 0 | Refills: 0 | DISCHARGE

## 2023-09-29 RX ORDER — ANASTROZOLE 1 MG/1
1 TABLET ORAL
Qty: 0 | Refills: 0 | DISCHARGE

## 2023-09-29 RX ORDER — ASPIRIN/CALCIUM CARB/MAGNESIUM 324 MG
0 TABLET ORAL
Qty: 0 | Refills: 0 | DISCHARGE

## 2023-09-29 RX ORDER — OMEPRAZOLE 10 MG/1
1 CAPSULE, DELAYED RELEASE ORAL
Qty: 0 | Refills: 0 | DISCHARGE

## 2023-09-29 RX ORDER — ASPIRIN/CALCIUM CARB/MAGNESIUM 324 MG
1 TABLET ORAL
Qty: 0 | Refills: 0 | DISCHARGE

## 2023-09-29 RX ORDER — LORATADINE 10 MG/1
1 TABLET ORAL
Qty: 0 | Refills: 0 | DISCHARGE

## 2023-09-29 RX ORDER — CARVEDILOL PHOSPHATE 80 MG/1
1 CAPSULE, EXTENDED RELEASE ORAL
Qty: 0 | Refills: 0 | DISCHARGE

## 2023-10-09 ENCOUNTER — APPOINTMENT (OUTPATIENT)
Dept: SURGERY | Facility: CLINIC | Age: 83
End: 2023-10-09
Payer: MEDICARE

## 2023-10-09 VITALS
HEART RATE: 79 BPM | SYSTOLIC BLOOD PRESSURE: 131 MMHG | BODY MASS INDEX: 22.49 KG/M2 | HEIGHT: 65 IN | WEIGHT: 135 LBS | DIASTOLIC BLOOD PRESSURE: 86 MMHG

## 2023-10-09 PROBLEM — I10 ESSENTIAL (PRIMARY) HYPERTENSION: Chronic | Status: ACTIVE | Noted: 2021-08-07

## 2023-10-09 PROBLEM — C56.9 MALIGNANT NEOPLASM OF UNSPECIFIED OVARY: Chronic | Status: ACTIVE | Noted: 2021-08-07

## 2023-10-09 PROBLEM — C50.912 MALIGNANT NEOPLASM OF UNSPECIFIED SITE OF LEFT FEMALE BREAST: Chronic | Status: ACTIVE | Noted: 2021-08-07

## 2023-10-09 PROBLEM — Z91.013 ALLERGY TO SEAFOOD: Chronic | Status: ACTIVE | Noted: 2021-08-06

## 2023-10-09 PROCEDURE — 99213 OFFICE O/P EST LOW 20 MIN: CPT

## 2023-11-18 NOTE — PATIENT PROFILE ADULT - FUNCTIONAL SCREEN CURRENT LEVEL: COMMUNICATION, MLM
0 = understands/communicates without difficulty
unable to assess due to patient's cognitive impairment. patient unable to formulate sentence. no family present to help answer questions.

## 2023-11-28 NOTE — BRIEF OPERATIVE NOTE - ANTIBIOTIC PROTOCOL
GYN Surgical Discharge Instructions:   No driving for 2 weeks or if still taking narcotic pain medications.    No heavy lifting. Not greater than 10 pounds for 2 weeks   No exercise, sexual activity, or soaking in bathtub for 8 weeks.   You may shower.   Anticipate vaginal spotting following surgery. This may not begin until 2 weeks following surgery and may wax and wane for a duration of 6-8 weeks following surgery.   If you develop fever, chills, or increased vaginal bleeding, call Dr Peoples's office.   If you develop chest pain, shortness of breath, lower extremity edema or pain, present to local emergency room.    
Followed protocol

## 2023-12-09 NOTE — ED ADULT TRIAGE NOTE - NS ED TRIAGE AVPU SCALE
Alert-The patient is alert, awake and responds to voice. The patient is oriented to time, place, and person. The triage nurse is able to obtain subjective information. Strong peripheral pulses

## 2024-02-22 NOTE — ASU DISCHARGE PLAN (ADULT/PEDIATRIC). - FOR NEXT 12 HOURS DO NOT:
Statement Selected Assistance OOB with selected safe patient handling equipment if applicable/Assistance with ambulation/Communicate risk of Fall with Harm to all staff, patient, and family/Encourage patient to sit up slowly, dangle for a short time, stand at bedside before walking/Monitor gait and stability/Orthostatic vital signs/Provide visual cue: red socks, yellow wristband, yellow gown, etc/Reinforce activity limits and safety measures with patient and family/Bed in lowest position, wheels locked, appropriate side rails in place/Call bell, personal items and telephone in reach/Instruct patient to call for assistance before getting out of bed/chair/stretcher/Non-slip footwear applied when patient is off stretcher/[place] to call system/Physically safe environment - no spills, clutter or unnecessary equipment/Purposeful Proactive Rounding/Room/bathroom lighting operational, light cord in reach

## 2024-03-11 ENCOUNTER — APPOINTMENT (OUTPATIENT)
Dept: GYNECOLOGIC ONCOLOGY | Facility: CLINIC | Age: 84
End: 2024-03-11
Payer: MEDICARE

## 2024-03-11 ENCOUNTER — NON-APPOINTMENT (OUTPATIENT)
Age: 84
End: 2024-03-11

## 2024-03-11 VITALS
SYSTOLIC BLOOD PRESSURE: 149 MMHG | DIASTOLIC BLOOD PRESSURE: 80 MMHG | BODY MASS INDEX: 20.86 KG/M2 | HEART RATE: 74 BPM | WEIGHT: 125.38 LBS

## 2024-03-11 DIAGNOSIS — C56.9 MALIGNANT NEOPLASM OF UNSPECIFIED OVARY: ICD-10-CM

## 2024-03-11 PROCEDURE — 99213 OFFICE O/P EST LOW 20 MIN: CPT

## 2024-03-11 RX ORDER — ASPIRIN 81 MG
81 TABLET, DELAYED RELEASE (ENTERIC COATED) ORAL
Refills: 0 | Status: ACTIVE | COMMUNITY

## 2024-03-11 RX ORDER — AMLODIPINE BESYLATE 5 MG/1
5 TABLET ORAL
Refills: 0 | Status: ACTIVE | COMMUNITY

## 2024-03-11 RX ORDER — ELECTROLYTES/DEXTROSE
SOLUTION, ORAL ORAL
Refills: 0 | Status: ACTIVE | COMMUNITY

## 2024-03-11 RX ORDER — CARVEDILOL 6.25 MG/1
6.25 TABLET, FILM COATED ORAL
Refills: 0 | Status: ACTIVE | COMMUNITY

## 2024-03-11 RX ORDER — ANASTROZOLE TABLETS 1 MG/1
1 TABLET ORAL
Refills: 0 | Status: ACTIVE | COMMUNITY

## 2024-03-11 RX ORDER — MEMANTINE HYDROCHLORIDE 5 MG/1
5 TABLET, FILM COATED ORAL
Refills: 0 | Status: ACTIVE | COMMUNITY

## 2024-03-11 RX ORDER — LOSARTAN POTASSIUM 50 MG/1
50 TABLET, FILM COATED ORAL
Refills: 0 | Status: ACTIVE | COMMUNITY

## 2024-03-12 LAB — CANCER AG125 SERPL-ACNC: 7 U/ML

## 2024-03-24 NOTE — PHYSICAL THERAPY INITIAL EVALUATION ADULT - GENERAL OBSERVATIONS, REHAB EVAL
DISPLAY PLAN FREE TEXT Patient encountered sitting at edge of bed, A&O x 4, with report of headache that improved with Tylenol.

## 2024-04-11 ENCOUNTER — APPOINTMENT (OUTPATIENT)
Dept: SURGERY | Facility: CLINIC | Age: 84
End: 2024-04-11
Payer: MEDICARE

## 2024-04-11 VITALS
HEIGHT: 65 IN | WEIGHT: 125 LBS | HEART RATE: 86 BPM | OXYGEN SATURATION: 97 % | BODY MASS INDEX: 20.83 KG/M2 | DIASTOLIC BLOOD PRESSURE: 87 MMHG | SYSTOLIC BLOOD PRESSURE: 143 MMHG

## 2024-04-11 PROCEDURE — 99213 OFFICE O/P EST LOW 20 MIN: CPT

## 2024-04-11 NOTE — ASSESSMENT
[FreeTextEntry1] : IMP: 83 y.o F s/p left breast total mastectomy with left breast axillary sentinel node biopsy 5/9/18.  R Breast Mammo 12/05/23--BIRADS 2. No suspicious findings.

## 2024-04-11 NOTE — PHYSICAL EXAM
[Normal Breath Sounds] : Normal breath sounds [Normal Rate and Rhythm] : normal rate and rhythm [No Rash or Lesion] : No rash or lesion [Alert] : alert [Oriented to Person] : oriented to person [Oriented to Place] : oriented to place [Oriented to Time] : oriented to time [Calm] : calm [JVD] : no jugular venous distention  [de-identified] : A/Ox3; NAD. appears comfortable [de-identified] : EOMI; sclera anicteric. Nasal mucosa pink, septum midline. Oral mucosa pink. Tongue midline, Pharynx without exudates. [de-identified] : well healed scar to left side, at mastectomy site ; no recurrence; no palpable masses to right breast, no axillary lymphadenopathy to either side  [de-identified] : abd is soft, NT/ND\par   [de-identified] : +ROM, no joint swelling

## 2024-04-11 NOTE — DATA REVIEWED
[FreeTextEntry1] : Patient: NIALL DISLA YOB: 1940 Phone: [phone] MRN: [id number] Acc: [id number] Date of Exam: 12-   EXAM:  DIGITAL UNILATERAL RIGHT SCREENING MAMMOGRAM WITH CAD AND TOMOSYNTHESIS  HISTORY:  The patient is 83 years old and is seen for a screening mammogram. The patient is positive for the BRCA 1 genetic mutation. Personal history of left breast cancer in 2022. Status post left mastectomy. She has a personal history of ovarian carcinoma. Family history of breast cancer: Sister.  CLINICAL BREAST EXAMINATION:  The patient reports clinical breast exam was more than one year ago.   TECHNIQUE:  The following views were obtained digitally: right craniocaudal, right mediolateral oblique. Low-dose full-field digital breast tomosynthesis examination was performed with tomosynthesis acquisitions and synthesized 2D reconstructed mammogram. Examination was reviewed with Sub[employer]net, an Artificial Intelligence Diagnostic Aid Software.  COMPARISON:  The present examination has been compared to prior breast imaging studies dating back to 10/3/2017. Her most recent prior mammogram was performed at an outside facility in 2022 and has not been provided for comparison.  FINDINGS: BREAST COMPOSITION:  There are scattered areas of fibroglandular density.   No suspicious masses, architectural distortion, or significant calcifications are detected. Scattered morphologically benign appearing calcifications are present. There are benign arterial vascular calcifications. A biopsy clip is in the posterior central superior breast.  IMPRESSION:  Benign findings. No mammographic evidence of malignancy. No significant change. Comparison with prior breast outside imaging is recommended.  If prior breast imaging studies are submitted for review, an addendum to this report with comparison will be issued.  FOLLOW-UP:  Follow-up imaging in 12 months.    ASSESSMENT:  BI-RADS Category 2:  Benign.

## 2024-04-11 NOTE — PLAN
[FreeTextEntry1] : continue care as per oncology for surveillance  continue w SBE and annual screening  RTO 6 months  Patient's questions and concerns addressed to their satisfaction, and patient verbalized an understanding of the information discussed.

## 2024-04-11 NOTE — CONSULT LETTER
[Dear  ___] : Dear  [unfilled], [Consult Letter:] : I had the pleasure of evaluating your patient, [unfilled]. [Consult Closing:] : Thank you very much for allowing me to participate in the care of this patient.  If you have any questions, please do not hesitate to contact me. [Sincerely,] : Sincerely, [FreeTextEntry3] : German Cole MD

## 2024-04-11 NOTE — HISTORY OF PRESENT ILLNESS
[de-identified] : Patient is an 82 y/o female with PMH of hypertension, hyperlipidemia, GERD, ovarian cancer in remission s/p hysterectomy and chemo in 1999 and chemotherapy in 2008, diagnosed with left breast cancer. Patient is s/p left breast total mastectomy with left breast axillary sentinel node biopsy 5/9/18. pathology results are consistent with Metastatic carcinoma involving 1 of 2 lymph nodes and invasive well-differentiated ductal carcinoma. Chemotherapy was discontinued due to the inability to tolerate the treatment.  Patient presents to the office today for 6 mo f/u visit. No longer takes PO Hormonal treatment. No complaints today. Feels well.  She also follows up with GYN oncologist, for stage III Ovarian CA; Dr. Pinto.   Most recent imaging, 12/2023-- R Breast Mammo, which had showed no suspicious findings BIRADS 2.  [de-identified] : Patient reports no interval changes to her overall health status or medical history.

## 2024-07-18 ENCOUNTER — APPOINTMENT (OUTPATIENT)
Dept: NEUROLOGY | Facility: CLINIC | Age: 84
End: 2024-07-18
Payer: MEDICARE

## 2024-07-18 VITALS
HEIGHT: 65 IN | SYSTOLIC BLOOD PRESSURE: 136 MMHG | BODY MASS INDEX: 21.33 KG/M2 | WEIGHT: 128 LBS | HEART RATE: 76 BPM | DIASTOLIC BLOOD PRESSURE: 83 MMHG

## 2024-07-18 DIAGNOSIS — F04 AMNESTIC DISORDER DUE TO KNOWN PHYSIOLOGICAL CONDITION: ICD-10-CM

## 2024-07-18 PROCEDURE — 99204 OFFICE O/P NEW MOD 45 MIN: CPT

## 2024-08-05 ENCOUNTER — APPOINTMENT (OUTPATIENT)
Dept: MRI IMAGING | Facility: CLINIC | Age: 84
End: 2024-08-05

## 2024-08-05 ENCOUNTER — OUTPATIENT (OUTPATIENT)
Dept: OUTPATIENT SERVICES | Facility: HOSPITAL | Age: 84
LOS: 1 days | End: 2024-08-05
Payer: COMMERCIAL

## 2024-08-05 DIAGNOSIS — Z98.890 OTHER SPECIFIED POSTPROCEDURAL STATES: Chronic | ICD-10-CM

## 2024-08-05 DIAGNOSIS — Z98.89 OTHER SPECIFIED POSTPROCEDURAL STATES: Chronic | ICD-10-CM

## 2024-08-05 DIAGNOSIS — F04 AMNESTIC DISORDER DUE TO KNOWN PHYSIOLOGICAL CONDITION: ICD-10-CM

## 2024-08-05 DIAGNOSIS — Z90.710 ACQUIRED ABSENCE OF BOTH CERVIX AND UTERUS: Chronic | ICD-10-CM

## 2024-08-05 DIAGNOSIS — Z90.12 ACQUIRED ABSENCE OF LEFT BREAST AND NIPPLE: Chronic | ICD-10-CM

## 2024-08-05 PROCEDURE — 76377 3D RENDER W/INTRP POSTPROCES: CPT

## 2024-08-05 PROCEDURE — 70551 MRI BRAIN STEM W/O DYE: CPT | Mod: 26

## 2024-08-05 PROCEDURE — 70551 MRI BRAIN STEM W/O DYE: CPT

## 2024-08-05 PROCEDURE — 76377 3D RENDER W/INTRP POSTPROCES: CPT | Mod: 26

## 2024-08-27 ENCOUNTER — NON-APPOINTMENT (OUTPATIENT)
Age: 84
End: 2024-08-27

## 2024-10-21 ENCOUNTER — APPOINTMENT (OUTPATIENT)
Dept: SURGERY | Facility: CLINIC | Age: 84
End: 2024-10-21
Payer: MEDICARE

## 2024-10-21 VITALS
HEART RATE: 73 BPM | WEIGHT: 127 LBS | BODY MASS INDEX: 21.16 KG/M2 | SYSTOLIC BLOOD PRESSURE: 162 MMHG | HEIGHT: 65 IN | DIASTOLIC BLOOD PRESSURE: 92 MMHG

## 2024-10-21 PROCEDURE — 99213 OFFICE O/P EST LOW 20 MIN: CPT

## 2025-03-10 ENCOUNTER — NON-APPOINTMENT (OUTPATIENT)
Age: 85
End: 2025-03-10

## 2025-03-10 ENCOUNTER — APPOINTMENT (OUTPATIENT)
Dept: GYNECOLOGIC ONCOLOGY | Facility: CLINIC | Age: 85
End: 2025-03-10
Payer: MEDICARE

## 2025-03-10 VITALS
TEMPERATURE: 97.3 F | HEART RATE: 76 BPM | OXYGEN SATURATION: 96 % | HEIGHT: 65 IN | WEIGHT: 129.2 LBS | BODY MASS INDEX: 21.52 KG/M2 | DIASTOLIC BLOOD PRESSURE: 85 MMHG | SYSTOLIC BLOOD PRESSURE: 150 MMHG

## 2025-03-10 DIAGNOSIS — C56.9 MALIGNANT NEOPLASM OF UNSPECIFIED OVARY: ICD-10-CM

## 2025-03-10 DIAGNOSIS — K59.00 CONSTIPATION, UNSPECIFIED: ICD-10-CM

## 2025-03-10 LAB — CANCER AG125 SERPL-ACNC: 8 U/ML

## 2025-03-10 PROCEDURE — 99213 OFFICE O/P EST LOW 20 MIN: CPT

## 2025-03-10 PROCEDURE — 99459 PELVIC EXAMINATION: CPT | Mod: NC

## 2025-04-21 ENCOUNTER — APPOINTMENT (OUTPATIENT)
Dept: SURGERY | Facility: CLINIC | Age: 85
End: 2025-04-21
Payer: MEDICARE

## 2025-04-21 VITALS
BODY MASS INDEX: 20.83 KG/M2 | SYSTOLIC BLOOD PRESSURE: 150 MMHG | HEIGHT: 65 IN | WEIGHT: 125 LBS | DIASTOLIC BLOOD PRESSURE: 78 MMHG | HEART RATE: 71 BPM

## 2025-04-21 DIAGNOSIS — K59.00 CONSTIPATION, UNSPECIFIED: ICD-10-CM

## 2025-04-21 PROCEDURE — 99213 OFFICE O/P EST LOW 20 MIN: CPT

## 2025-04-21 RX ORDER — POLYETHYLENE GLYCOL 3350 17 G/17G
17 POWDER, FOR SOLUTION ORAL
Qty: 1 | Refills: 1 | Status: ACTIVE | COMMUNITY
Start: 2025-04-21 | End: 1900-01-01

## 2025-07-14 ENCOUNTER — EMERGENCY (EMERGENCY)
Facility: HOSPITAL | Age: 85
LOS: 1 days | End: 2025-07-14
Attending: STUDENT IN AN ORGANIZED HEALTH CARE EDUCATION/TRAINING PROGRAM
Payer: COMMERCIAL

## 2025-07-14 VITALS
SYSTOLIC BLOOD PRESSURE: 168 MMHG | HEART RATE: 69 BPM | DIASTOLIC BLOOD PRESSURE: 84 MMHG | OXYGEN SATURATION: 98 % | RESPIRATION RATE: 18 BRPM | TEMPERATURE: 98 F | WEIGHT: 115.08 LBS

## 2025-07-14 VITALS
RESPIRATION RATE: 16 BRPM | SYSTOLIC BLOOD PRESSURE: 140 MMHG | DIASTOLIC BLOOD PRESSURE: 73 MMHG | OXYGEN SATURATION: 97 % | HEART RATE: 66 BPM | TEMPERATURE: 98 F

## 2025-07-14 DIAGNOSIS — Z90.710 ACQUIRED ABSENCE OF BOTH CERVIX AND UTERUS: Chronic | ICD-10-CM

## 2025-07-14 DIAGNOSIS — Z98.89 OTHER SPECIFIED POSTPROCEDURAL STATES: Chronic | ICD-10-CM

## 2025-07-14 DIAGNOSIS — Z90.12 ACQUIRED ABSENCE OF LEFT BREAST AND NIPPLE: Chronic | ICD-10-CM

## 2025-07-14 DIAGNOSIS — Z98.890 OTHER SPECIFIED POSTPROCEDURAL STATES: Chronic | ICD-10-CM

## 2025-07-14 LAB
ALBUMIN SERPL ELPH-MCNC: 3.4 G/DL — LOW (ref 3.5–5)
ALP SERPL-CCNC: 56 U/L — SIGNIFICANT CHANGE UP (ref 40–120)
ALT FLD-CCNC: 20 U/L DA — SIGNIFICANT CHANGE UP (ref 10–60)
ANION GAP SERPL CALC-SCNC: 4 MMOL/L — LOW (ref 5–17)
APPEARANCE UR: CLEAR — SIGNIFICANT CHANGE UP
AST SERPL-CCNC: 25 U/L — SIGNIFICANT CHANGE UP (ref 10–40)
BACTERIA # UR AUTO: ABNORMAL /HPF
BILIRUB DIRECT SERPL-MCNC: 0.1 MG/DL — SIGNIFICANT CHANGE UP (ref 0–0.3)
BILIRUB INDIRECT FLD-MCNC: 0.6 MG/DL — SIGNIFICANT CHANGE UP (ref 0.2–1)
BILIRUB SERPL-MCNC: 0.7 MG/DL — SIGNIFICANT CHANGE UP (ref 0.2–1.2)
BILIRUB UR-MCNC: NEGATIVE — SIGNIFICANT CHANGE UP
BUN SERPL-MCNC: 11 MG/DL — SIGNIFICANT CHANGE UP (ref 7–18)
CALCIUM SERPL-MCNC: 9.7 MG/DL — SIGNIFICANT CHANGE UP (ref 8.4–10.5)
CHLORIDE SERPL-SCNC: 104 MMOL/L — SIGNIFICANT CHANGE UP (ref 96–108)
CO2 SERPL-SCNC: 31 MMOL/L — SIGNIFICANT CHANGE UP (ref 22–31)
COLOR SPEC: YELLOW — SIGNIFICANT CHANGE UP
CREAT SERPL-MCNC: 0.87 MG/DL — SIGNIFICANT CHANGE UP (ref 0.5–1.3)
DIFF PNL FLD: NEGATIVE — SIGNIFICANT CHANGE UP
EGFR: 65 ML/MIN/1.73M2 — SIGNIFICANT CHANGE UP
EGFR: 65 ML/MIN/1.73M2 — SIGNIFICANT CHANGE UP
EPI CELLS # UR: PRESENT
GLUCOSE SERPL-MCNC: 69 MG/DL — LOW (ref 70–99)
GLUCOSE UR QL: NEGATIVE MG/DL — SIGNIFICANT CHANGE UP
HCT VFR BLD CALC: 39 % — SIGNIFICANT CHANGE UP (ref 34.5–45)
HGB BLD-MCNC: 13.2 G/DL — SIGNIFICANT CHANGE UP (ref 11.5–15.5)
KETONES UR QL: NEGATIVE MG/DL — SIGNIFICANT CHANGE UP
LEUKOCYTE ESTERASE UR-ACNC: ABNORMAL
LIDOCAIN IGE QN: 59 U/L — SIGNIFICANT CHANGE UP (ref 13–75)
MAGNESIUM SERPL-MCNC: 1.9 MG/DL — SIGNIFICANT CHANGE UP (ref 1.6–2.6)
MCHC RBC-ENTMCNC: 31.7 PG — SIGNIFICANT CHANGE UP (ref 27–34)
MCHC RBC-ENTMCNC: 33.8 G/DL — SIGNIFICANT CHANGE UP (ref 32–36)
MCV RBC AUTO: 93.8 FL — SIGNIFICANT CHANGE UP (ref 80–100)
NITRITE UR-MCNC: NEGATIVE — SIGNIFICANT CHANGE UP
NRBC BLD AUTO-RTO: 0 /100 WBCS — SIGNIFICANT CHANGE UP (ref 0–0)
PH UR: 8 — SIGNIFICANT CHANGE UP (ref 5–8)
PHOSPHATE SERPL-MCNC: 3.4 MG/DL — SIGNIFICANT CHANGE UP (ref 2.5–4.5)
PLATELET # BLD AUTO: 205 K/UL — SIGNIFICANT CHANGE UP (ref 150–400)
POTASSIUM SERPL-MCNC: 3.7 MMOL/L — SIGNIFICANT CHANGE UP (ref 3.5–5.3)
POTASSIUM SERPL-SCNC: 3.7 MMOL/L — SIGNIFICANT CHANGE UP (ref 3.5–5.3)
PROT SERPL-MCNC: 7.3 G/DL — SIGNIFICANT CHANGE UP (ref 6–8.3)
PROT UR-MCNC: NEGATIVE MG/DL — SIGNIFICANT CHANGE UP
RBC # BLD: 4.16 M/UL — SIGNIFICANT CHANGE UP (ref 3.8–5.2)
RBC # FLD: 13.5 % — SIGNIFICANT CHANGE UP (ref 10.3–14.5)
RBC CASTS # UR COMP ASSIST: 2 /HPF — SIGNIFICANT CHANGE UP (ref 0–4)
SODIUM SERPL-SCNC: 139 MMOL/L — SIGNIFICANT CHANGE UP (ref 135–145)
SP GR SPEC: 1.02 — SIGNIFICANT CHANGE UP (ref 1–1.03)
TROPONIN I, HIGH SENSITIVITY RESULT: 11.1 NG/L — SIGNIFICANT CHANGE UP
UROBILINOGEN FLD QL: 0.2 MG/DL — SIGNIFICANT CHANGE UP (ref 0.2–1)
WBC # BLD: 6.25 K/UL — SIGNIFICANT CHANGE UP (ref 3.8–10.5)
WBC # FLD AUTO: 6.25 K/UL — SIGNIFICANT CHANGE UP (ref 3.8–10.5)
WBC UR QL: 30 /HPF — HIGH (ref 0–5)

## 2025-07-14 PROCEDURE — 81001 URINALYSIS AUTO W/SCOPE: CPT

## 2025-07-14 PROCEDURE — 83735 ASSAY OF MAGNESIUM: CPT

## 2025-07-14 PROCEDURE — 93010 ELECTROCARDIOGRAM REPORT: CPT

## 2025-07-14 PROCEDURE — 70450 CT HEAD/BRAIN W/O DYE: CPT | Mod: 26

## 2025-07-14 PROCEDURE — 85027 COMPLETE CBC AUTOMATED: CPT

## 2025-07-14 PROCEDURE — 83690 ASSAY OF LIPASE: CPT

## 2025-07-14 PROCEDURE — 99285 EMERGENCY DEPT VISIT HI MDM: CPT | Mod: 25

## 2025-07-14 PROCEDURE — 80076 HEPATIC FUNCTION PANEL: CPT

## 2025-07-14 PROCEDURE — 80048 BASIC METABOLIC PNL TOTAL CA: CPT

## 2025-07-14 PROCEDURE — 36415 COLL VENOUS BLD VENIPUNCTURE: CPT

## 2025-07-14 PROCEDURE — 84100 ASSAY OF PHOSPHORUS: CPT

## 2025-07-14 PROCEDURE — 74177 CT ABD & PELVIS W/CONTRAST: CPT | Mod: 26

## 2025-07-14 PROCEDURE — 82962 GLUCOSE BLOOD TEST: CPT

## 2025-07-14 PROCEDURE — 87086 URINE CULTURE/COLONY COUNT: CPT

## 2025-07-14 PROCEDURE — 84484 ASSAY OF TROPONIN QUANT: CPT

## 2025-07-14 PROCEDURE — 74177 CT ABD & PELVIS W/CONTRAST: CPT

## 2025-07-14 PROCEDURE — 70450 CT HEAD/BRAIN W/O DYE: CPT

## 2025-07-14 PROCEDURE — 99285 EMERGENCY DEPT VISIT HI MDM: CPT

## 2025-07-14 PROCEDURE — 93005 ELECTROCARDIOGRAM TRACING: CPT

## 2025-07-14 RX ADMIN — Medication 1000 MILLILITER(S): at 18:45

## 2025-07-14 NOTE — ED PROVIDER NOTE - PHYSICAL EXAMINATION
GENERAL: lethargic   HEAD:  Atraumatic, Normocephalic  EYES: EOMI, PERRLA,  ENT: MMM; oropharynx clear  NECK: Supple, No JVD  CHEST/LUNG: Clear to auscultation bilaterally; No wheeze  HEART: Regular rate and rhythm; No murmurs, rubs, or gallops  ABDOMEN: Soft, diffuse abdominal ttp; Nondistended; Bowel sounds present  EXTREMITIES:  2+ Peripheral Pulses, No clubbing, cyanosis, or edema  NEUROLOGY: no focal motor or sensory deficits. grossly moving all extremities.   SKIN: No rashes or lesions

## 2025-07-14 NOTE — ED PROVIDER NOTE - OBJECTIVE STATEMENT
85 y/o F PMH of HTN, HLD, GERD, Ovarian Cancer s/p Bilateral Salpingo-oophorectomy hysterectomy 1999, Left Mastectomy 2019 presenting for evaluation for syncopal episode. 83 y/o F PMH of HTN, HLD, GERD, Ovarian Cancer s/p Bilateral Salpingo-oophorectomy hysterectomy 1999, Left Mastectomy 2019 presenting for evaluation for syncopal episode.   Collateral information from daughter--->patient has been functionally declining since Easter of this year after the death of a loved one and has been having intermittent episodes of 'delirium'. She was treated for a UTI three weeks ago and recently started on hydroxyzine. Daughter notes constipation and was recently started on a laxative. PCP recommended she start Lexapro--> has not yet. Lives with daughter. Today in waiting room of [facility], patient had syncopal episode while having blood pressure taking and came to ER for evaluation.

## 2025-07-14 NOTE — ED ADULT NURSE NOTE - AS PAIN REST
"Spoke with patient.  -Patient reporting cough that started 3 weeks ago, patient was taking NyQuil and benadryl for this with slight relief- then symptoms went away.  -2 weeks ago cough came back with a \"dry\" feeling and then went away.  -This past week patient states the cough came back and she can feel it in her chest as it is more wet and productive.     Denies, runny nose, sore throat, fever, body aches, chills and headache.     Appointment scheduled with PCP tomorrow 1/30.    Rosemarie Cifuentes RN     " 0 (no pain/absence of nonverbal indicators of pain)

## 2025-07-14 NOTE — ED ADULT NURSE NOTE - OBJECTIVE STATEMENT
Patient states that she had a near syncopal episode at a doctors clinic earlier today, currently is not complaining of any pain, no injury during LOC

## 2025-07-14 NOTE — ED PROVIDER NOTE - CLINICAL SUMMARY MEDICAL DECISION MAKING FREE TEXT BOX
85 y/o F PMH of HTN, HLD, GERD, Ovarian Cancer s/p Bilateral Salpingo-oophorectomy hysterectomy 1999, Left Mastectomy 2019 presenting for evaluation for syncopal episode.   Intermittent delirium-AMS - check CTH  Diffuse abdominal ttp-- Check CT a/p   Screening labs   IVF  UA  Disposition as per above evaluation.

## 2025-07-14 NOTE — ED PROVIDER NOTE - NSFOLLOWUPCLINICS_GEN_ALL_ED_FT
[facility]  Cardiology  [address], [address]  [place] [address]  Phone: [phone]  Fax:     Pediatric Neurology  Pediatric Neurology  [address]  [place] [address]  Phone: [phone]  Fax: [phone]

## 2025-07-14 NOTE — ED PROVIDER NOTE - NSFOLLOWUPINSTRUCTIONS_ED_ALL_ED_FT
Please follow up with neurology and primary care doctor.   Ensure you stay adequately hydrated   Seek Medical attention or return to the emergency department for any new or worsening symptoms.  Continue bowel regimen  Continue aspirin daily   Consider follow up with cardiology as well for Echo and Holter monitor. Please follow up with neurology and primary care doctor.   Ensure you stay adequately hydrated   Seek Medical attention or return to the emergency department for any new or worsening symptoms.  Continue bowel regimen  Continue aspirin daily   Consider follow up with cardiology as well for Echo and Holter monitor.    [facility] [employer] [facility]: [phone]

## 2025-07-14 NOTE — ED PROVIDER NOTE - PATIENT PORTAL LINK FT
You can access the FollowMyHealth Patient Portal offered by [facility] by registering at the following website: [email or web address]. By joining [facility]’s FollowMyHealth portal, you will also be able to view your health information using other applications (apps) compatible with our system.

## 2025-07-16 LAB
CULTURE RESULTS: SIGNIFICANT CHANGE UP
SPECIMEN SOURCE: SIGNIFICANT CHANGE UP

## 2025-07-28 ENCOUNTER — APPOINTMENT (OUTPATIENT)
Dept: NEUROLOGY | Facility: CLINIC | Age: 85
End: 2025-07-28

## 2025-09-08 ENCOUNTER — APPOINTMENT (OUTPATIENT)
Dept: SURGERY | Facility: CLINIC | Age: 85
End: 2025-09-08
Payer: MEDICARE

## 2025-09-08 VITALS
HEIGHT: 65 IN | DIASTOLIC BLOOD PRESSURE: 81 MMHG | HEART RATE: 65 BPM | SYSTOLIC BLOOD PRESSURE: 155 MMHG | WEIGHT: 114 LBS | BODY MASS INDEX: 18.99 KG/M2

## 2025-09-08 DIAGNOSIS — R22.2 LOCALIZED SWELLING, MASS AND LUMP, TRUNK: ICD-10-CM

## 2025-09-08 PROCEDURE — 99213 OFFICE O/P EST LOW 20 MIN: CPT

## 2025-09-09 ENCOUNTER — APPOINTMENT (OUTPATIENT)
Dept: NEUROLOGY | Facility: CLINIC | Age: 85
End: 2025-09-09
Payer: MEDICARE

## 2025-09-09 VITALS
SYSTOLIC BLOOD PRESSURE: 130 MMHG | BODY MASS INDEX: 18.99 KG/M2 | WEIGHT: 114 LBS | HEIGHT: 65 IN | HEART RATE: 67 BPM | DIASTOLIC BLOOD PRESSURE: 80 MMHG

## 2025-09-09 DIAGNOSIS — R41.3 OTHER AMNESIA: ICD-10-CM

## 2025-09-09 DIAGNOSIS — R26.89 OTHER ABNORMALITIES OF GAIT AND MOBILITY: ICD-10-CM

## 2025-09-09 PROCEDURE — 99205 OFFICE O/P NEW HI 60 MIN: CPT

## 2025-09-10 ENCOUNTER — RESULT REVIEW (OUTPATIENT)
Age: 85
End: 2025-09-10

## 2025-09-12 ENCOUNTER — TRANSCRIPTION ENCOUNTER (OUTPATIENT)
Age: 85
End: 2025-09-12

## 2025-09-19 ENCOUNTER — TRANSCRIPTION ENCOUNTER (OUTPATIENT)
Age: 85
End: 2025-09-19